# Patient Record
Sex: FEMALE | Race: WHITE | NOT HISPANIC OR LATINO | Employment: OTHER | URBAN - METROPOLITAN AREA
[De-identification: names, ages, dates, MRNs, and addresses within clinical notes are randomized per-mention and may not be internally consistent; named-entity substitution may affect disease eponyms.]

---

## 2017-05-12 ENCOUNTER — OFFICE VISIT - HEALTHEAST (OUTPATIENT)
Dept: INTERNAL MEDICINE | Facility: CLINIC | Age: 82
End: 2017-05-12

## 2017-05-12 ENCOUNTER — COMMUNICATION - HEALTHEAST (OUTPATIENT)
Dept: INTERNAL MEDICINE | Facility: CLINIC | Age: 82
End: 2017-05-12

## 2017-05-12 DIAGNOSIS — I10 HTN (HYPERTENSION): ICD-10-CM

## 2017-05-12 DIAGNOSIS — M81.0 OSTEOPOROSIS: ICD-10-CM

## 2017-05-12 DIAGNOSIS — Z00.00 ROUTINE PHYSICAL EXAMINATION: ICD-10-CM

## 2017-05-12 DIAGNOSIS — Z78.9 NONSMOKER: ICD-10-CM

## 2017-05-12 DIAGNOSIS — Z66 DNR (DO NOT RESUSCITATE): ICD-10-CM

## 2017-05-12 ASSESSMENT — MIFFLIN-ST. JEOR: SCORE: 786.4

## 2017-05-17 ENCOUNTER — RECORDS - HEALTHEAST (OUTPATIENT)
Dept: ADMINISTRATIVE | Facility: OTHER | Age: 82
End: 2017-05-17

## 2018-02-28 ENCOUNTER — COMMUNICATION - HEALTHEAST (OUTPATIENT)
Dept: INTERNAL MEDICINE | Facility: CLINIC | Age: 83
End: 2018-02-28

## 2018-02-28 DIAGNOSIS — I10 HTN (HYPERTENSION): ICD-10-CM

## 2018-03-29 ENCOUNTER — COMMUNICATION - HEALTHEAST (OUTPATIENT)
Dept: INTERNAL MEDICINE | Facility: CLINIC | Age: 83
End: 2018-03-29

## 2018-05-07 ENCOUNTER — COMMUNICATION - HEALTHEAST (OUTPATIENT)
Dept: INTERNAL MEDICINE | Facility: CLINIC | Age: 83
End: 2018-05-07

## 2018-05-07 DIAGNOSIS — I10 HTN (HYPERTENSION): ICD-10-CM

## 2018-06-05 ENCOUNTER — COMMUNICATION - HEALTHEAST (OUTPATIENT)
Dept: INTERNAL MEDICINE | Facility: CLINIC | Age: 83
End: 2018-06-05

## 2018-06-05 ENCOUNTER — OFFICE VISIT - HEALTHEAST (OUTPATIENT)
Dept: INTERNAL MEDICINE | Facility: CLINIC | Age: 83
End: 2018-06-05

## 2018-06-05 DIAGNOSIS — I10 HTN (HYPERTENSION): ICD-10-CM

## 2018-06-05 DIAGNOSIS — M81.0 OSTEOPOROSIS: ICD-10-CM

## 2018-06-05 DIAGNOSIS — R01.1 HEART MURMUR: ICD-10-CM

## 2018-06-05 LAB
ANION GAP SERPL CALCULATED.3IONS-SCNC: 11 MMOL/L (ref 5–18)
BUN SERPL-MCNC: 22 MG/DL (ref 8–28)
CALCIUM SERPL-MCNC: 10.1 MG/DL (ref 8.5–10.5)
CHLORIDE BLD-SCNC: 101 MMOL/L (ref 98–107)
CO2 SERPL-SCNC: 29 MMOL/L (ref 22–31)
CREAT SERPL-MCNC: 0.86 MG/DL (ref 0.6–1.1)
GFR SERPL CREATININE-BSD FRML MDRD: >60 ML/MIN/1.73M2
GLUCOSE BLD-MCNC: 91 MG/DL (ref 70–125)
POTASSIUM BLD-SCNC: 3.9 MMOL/L (ref 3.5–5)
SODIUM SERPL-SCNC: 141 MMOL/L (ref 136–145)

## 2018-06-09 ENCOUNTER — COMMUNICATION - HEALTHEAST (OUTPATIENT)
Dept: INTERNAL MEDICINE | Facility: CLINIC | Age: 83
End: 2018-06-09

## 2018-06-09 DIAGNOSIS — I10 HTN (HYPERTENSION): ICD-10-CM

## 2018-08-10 ENCOUNTER — COMMUNICATION - HEALTHEAST (OUTPATIENT)
Dept: INTERNAL MEDICINE | Facility: CLINIC | Age: 83
End: 2018-08-10

## 2019-03-14 ENCOUNTER — COMMUNICATION - HEALTHEAST (OUTPATIENT)
Dept: CARE COORDINATION | Facility: CLINIC | Age: 84
End: 2019-03-14

## 2019-03-14 ENCOUNTER — COMMUNICATION - HEALTHEAST (OUTPATIENT)
Dept: INTERNAL MEDICINE | Facility: CLINIC | Age: 84
End: 2019-03-14

## 2019-03-18 ENCOUNTER — OFFICE VISIT - HEALTHEAST (OUTPATIENT)
Dept: GERIATRICS | Facility: CLINIC | Age: 84
End: 2019-03-18

## 2019-03-18 DIAGNOSIS — R53.1 WEAKNESS: ICD-10-CM

## 2019-03-18 DIAGNOSIS — K92.1 MELENA: ICD-10-CM

## 2019-03-18 DIAGNOSIS — I48.0 PAROXYSMAL ATRIAL FIBRILLATION (H): ICD-10-CM

## 2019-03-18 DIAGNOSIS — I15.9 SECONDARY HYPERTENSION: ICD-10-CM

## 2019-03-21 ENCOUNTER — OFFICE VISIT - HEALTHEAST (OUTPATIENT)
Dept: GERIATRICS | Facility: CLINIC | Age: 84
End: 2019-03-21

## 2019-03-21 DIAGNOSIS — I42.0 DILATED CARDIOMYOPATHY (H): ICD-10-CM

## 2019-03-21 DIAGNOSIS — R53.1 WEAKNESS: ICD-10-CM

## 2019-03-21 DIAGNOSIS — I10 ESSENTIAL HYPERTENSION: ICD-10-CM

## 2019-03-21 DIAGNOSIS — I48.0 PAROXYSMAL ATRIAL FIBRILLATION (H): ICD-10-CM

## 2019-03-22 ENCOUNTER — AMBULATORY - HEALTHEAST (OUTPATIENT)
Dept: CARDIOLOGY | Facility: CLINIC | Age: 84
End: 2019-03-22

## 2019-03-22 ENCOUNTER — OFFICE VISIT - HEALTHEAST (OUTPATIENT)
Dept: GERIATRICS | Facility: CLINIC | Age: 84
End: 2019-03-22

## 2019-03-22 DIAGNOSIS — K92.1 MELENA: ICD-10-CM

## 2019-03-22 DIAGNOSIS — R53.1 WEAKNESS: ICD-10-CM

## 2019-03-22 DIAGNOSIS — I15.9 SECONDARY HYPERTENSION: ICD-10-CM

## 2019-03-22 DIAGNOSIS — I48.0 PAROXYSMAL ATRIAL FIBRILLATION (H): ICD-10-CM

## 2019-03-26 ENCOUNTER — OFFICE VISIT - HEALTHEAST (OUTPATIENT)
Dept: GERIATRICS | Facility: CLINIC | Age: 84
End: 2019-03-26

## 2019-03-26 DIAGNOSIS — K92.2 GASTROINTESTINAL HEMORRHAGE, UNSPECIFIED GASTROINTESTINAL HEMORRHAGE TYPE: ICD-10-CM

## 2019-03-26 DIAGNOSIS — I42.9 CARDIOMYOPATHY, UNSPECIFIED TYPE (H): ICD-10-CM

## 2019-03-26 DIAGNOSIS — I48.91 ATRIAL FIBRILLATION, UNSPECIFIED TYPE (H): ICD-10-CM

## 2019-03-26 DIAGNOSIS — I10 ESSENTIAL HYPERTENSION: ICD-10-CM

## 2019-04-01 ENCOUNTER — RECORDS - HEALTHEAST (OUTPATIENT)
Dept: ADMINISTRATIVE | Facility: OTHER | Age: 84
End: 2019-04-01

## 2019-04-02 ENCOUNTER — OFFICE VISIT - HEALTHEAST (OUTPATIENT)
Dept: GERIATRICS | Facility: CLINIC | Age: 84
End: 2019-04-02

## 2019-04-02 DIAGNOSIS — K92.1 MELENA: ICD-10-CM

## 2019-04-02 DIAGNOSIS — I10 ESSENTIAL HYPERTENSION: ICD-10-CM

## 2019-04-02 DIAGNOSIS — I48.91 ATRIAL FIBRILLATION, UNSPECIFIED TYPE (H): ICD-10-CM

## 2019-04-02 DIAGNOSIS — R53.1 WEAKNESS: ICD-10-CM

## 2019-04-03 ENCOUNTER — RECORDS - HEALTHEAST (OUTPATIENT)
Dept: ADMINISTRATIVE | Facility: OTHER | Age: 84
End: 2019-04-03

## 2019-04-04 ENCOUNTER — RECORDS - HEALTHEAST (OUTPATIENT)
Dept: ADMINISTRATIVE | Facility: OTHER | Age: 84
End: 2019-04-04

## 2019-04-04 ENCOUNTER — AMBULATORY - HEALTHEAST (OUTPATIENT)
Dept: GERIATRICS | Facility: CLINIC | Age: 84
End: 2019-04-04

## 2019-04-04 ENCOUNTER — COMMUNICATION - HEALTHEAST (OUTPATIENT)
Dept: INTERNAL MEDICINE | Facility: CLINIC | Age: 84
End: 2019-04-04

## 2019-04-04 ENCOUNTER — COMMUNICATION - HEALTHEAST (OUTPATIENT)
Dept: GERIATRICS | Facility: CLINIC | Age: 84
End: 2019-04-04

## 2019-04-05 ENCOUNTER — COMMUNICATION - HEALTHEAST (OUTPATIENT)
Dept: INTERNAL MEDICINE | Facility: CLINIC | Age: 84
End: 2019-04-05

## 2019-04-05 ENCOUNTER — RECORDS - HEALTHEAST (OUTPATIENT)
Dept: ADMINISTRATIVE | Facility: OTHER | Age: 84
End: 2019-04-05

## 2019-04-08 ENCOUNTER — COMMUNICATION - HEALTHEAST (OUTPATIENT)
Dept: GERIATRICS | Facility: CLINIC | Age: 84
End: 2019-04-08

## 2019-04-12 ENCOUNTER — OFFICE VISIT - HEALTHEAST (OUTPATIENT)
Dept: INTERNAL MEDICINE | Facility: CLINIC | Age: 84
End: 2019-04-12

## 2019-04-12 DIAGNOSIS — Z09 HOSPITAL DISCHARGE FOLLOW-UP: ICD-10-CM

## 2019-04-12 DIAGNOSIS — D64.9 ANEMIA, UNSPECIFIED TYPE: ICD-10-CM

## 2019-04-12 DIAGNOSIS — I10 ESSENTIAL HYPERTENSION: ICD-10-CM

## 2019-04-12 DIAGNOSIS — R06.02 SOB (SHORTNESS OF BREATH): ICD-10-CM

## 2019-04-12 DIAGNOSIS — K92.1 MELENA: ICD-10-CM

## 2019-04-12 DIAGNOSIS — I48.91 ATRIAL FIBRILLATION WITH RAPID VENTRICULAR RESPONSE (H): ICD-10-CM

## 2019-04-12 DIAGNOSIS — R53.1 WEAKNESS: ICD-10-CM

## 2019-04-12 DIAGNOSIS — A41.9 SEPSIS, DUE TO UNSPECIFIED ORGANISM: ICD-10-CM

## 2019-04-12 DIAGNOSIS — I48.0 PAROXYSMAL ATRIAL FIBRILLATION (H): ICD-10-CM

## 2019-04-12 DIAGNOSIS — I42.0 DILATED CARDIOMYOPATHY (H): ICD-10-CM

## 2019-04-12 DIAGNOSIS — N39.0 URINARY TRACT INFECTION WITHOUT HEMATURIA, SITE UNSPECIFIED: ICD-10-CM

## 2019-04-12 LAB
ANION GAP SERPL CALCULATED.3IONS-SCNC: 9 MMOL/L (ref 5–18)
BNP SERPL-MCNC: 3281 PG/ML (ref 0–167)
BUN SERPL-MCNC: 24 MG/DL (ref 8–28)
CALCIUM SERPL-MCNC: 9.5 MG/DL (ref 8.5–10.5)
CHLORIDE BLD-SCNC: 105 MMOL/L (ref 98–107)
CO2 SERPL-SCNC: 27 MMOL/L (ref 22–31)
CREAT SERPL-MCNC: 0.88 MG/DL (ref 0.6–1.1)
ERYTHROCYTE [DISTWIDTH] IN BLOOD BY AUTOMATED COUNT: 11.1 % (ref 11–14.5)
GFR SERPL CREATININE-BSD FRML MDRD: 60 ML/MIN/1.73M2
GLUCOSE BLD-MCNC: 68 MG/DL (ref 70–125)
HCT VFR BLD AUTO: 38.6 % (ref 35–47)
HGB BLD-MCNC: 13.2 G/DL (ref 12–16)
MCH RBC QN AUTO: 31.3 PG (ref 27–34)
MCHC RBC AUTO-ENTMCNC: 34.1 G/DL (ref 32–36)
MCV RBC AUTO: 92 FL (ref 80–100)
PLATELET # BLD AUTO: 265 THOU/UL (ref 140–440)
PMV BLD AUTO: 8.3 FL (ref 7–10)
POTASSIUM BLD-SCNC: 4.8 MMOL/L (ref 3.5–5)
RBC # BLD AUTO: 4.21 MILL/UL (ref 3.8–5.4)
SODIUM SERPL-SCNC: 141 MMOL/L (ref 136–145)
WBC: 9 THOU/UL (ref 4–11)

## 2019-04-14 ENCOUNTER — COMMUNICATION - HEALTHEAST (OUTPATIENT)
Dept: INTERNAL MEDICINE | Facility: CLINIC | Age: 84
End: 2019-04-14

## 2019-04-23 ENCOUNTER — RECORDS - HEALTHEAST (OUTPATIENT)
Dept: ADMINISTRATIVE | Facility: OTHER | Age: 84
End: 2019-04-23

## 2019-04-24 ENCOUNTER — RECORDS - HEALTHEAST (OUTPATIENT)
Dept: ADMINISTRATIVE | Facility: OTHER | Age: 84
End: 2019-04-24

## 2019-04-26 ENCOUNTER — RECORDS - HEALTHEAST (OUTPATIENT)
Dept: ADMINISTRATIVE | Facility: OTHER | Age: 84
End: 2019-04-26

## 2019-05-03 ENCOUNTER — AMBULATORY - HEALTHEAST (OUTPATIENT)
Dept: OTHER | Facility: CLINIC | Age: 84
End: 2019-05-03

## 2019-05-08 ENCOUNTER — OFFICE VISIT - HEALTHEAST (OUTPATIENT)
Dept: CARDIOLOGY | Facility: CLINIC | Age: 84
End: 2019-05-08

## 2019-05-08 DIAGNOSIS — I48.0 PAROXYSMAL ATRIAL FIBRILLATION (H): ICD-10-CM

## 2019-05-08 DIAGNOSIS — I15.9 SECONDARY HYPERTENSION: ICD-10-CM

## 2019-05-08 DIAGNOSIS — I42.0 DILATED CARDIOMYOPATHY (H): ICD-10-CM

## 2019-05-08 ASSESSMENT — MIFFLIN-ST. JEOR: SCORE: 751.03

## 2019-06-24 ENCOUNTER — OFFICE VISIT - HEALTHEAST (OUTPATIENT)
Dept: INTERNAL MEDICINE | Facility: CLINIC | Age: 84
End: 2019-06-24

## 2019-06-24 ENCOUNTER — COMMUNICATION - HEALTHEAST (OUTPATIENT)
Dept: INTERNAL MEDICINE | Facility: CLINIC | Age: 84
End: 2019-06-24

## 2019-06-24 DIAGNOSIS — I42.0 DILATED CARDIOMYOPATHY (H): ICD-10-CM

## 2019-06-24 DIAGNOSIS — D64.9 ANEMIA, UNSPECIFIED TYPE: ICD-10-CM

## 2019-06-24 DIAGNOSIS — R06.02 SOB (SHORTNESS OF BREATH): ICD-10-CM

## 2019-06-24 DIAGNOSIS — K92.1 MELENA: ICD-10-CM

## 2019-06-24 DIAGNOSIS — R09.89 CARDIAC LEFT VENTRICULAR EJECTION FRACTION 10-20 PERCENT: ICD-10-CM

## 2019-06-24 DIAGNOSIS — K59.00 CONSTIPATION, UNSPECIFIED CONSTIPATION TYPE: ICD-10-CM

## 2019-06-24 DIAGNOSIS — I48.0 PAF (PAROXYSMAL ATRIAL FIBRILLATION) (H): ICD-10-CM

## 2019-06-24 DIAGNOSIS — N39.0 URINARY TRACT INFECTION WITHOUT HEMATURIA, SITE UNSPECIFIED: ICD-10-CM

## 2019-06-24 DIAGNOSIS — D69.2 SENILE PURPURA (H): ICD-10-CM

## 2019-06-24 DIAGNOSIS — I10 ESSENTIAL HYPERTENSION: ICD-10-CM

## 2019-06-24 DIAGNOSIS — R53.1 WEAKNESS: ICD-10-CM

## 2019-06-24 DIAGNOSIS — A41.9 SEPSIS, DUE TO UNSPECIFIED ORGANISM: ICD-10-CM

## 2019-06-24 LAB
ANION GAP SERPL CALCULATED.3IONS-SCNC: 11 MMOL/L (ref 5–18)
BNP SERPL-MCNC: 1339 PG/ML (ref 0–167)
BUN SERPL-MCNC: 26 MG/DL (ref 8–28)
CALCIUM SERPL-MCNC: 9.9 MG/DL (ref 8.5–10.5)
CHLORIDE BLD-SCNC: 104 MMOL/L (ref 98–107)
CO2 SERPL-SCNC: 27 MMOL/L (ref 22–31)
CREAT SERPL-MCNC: 1.05 MG/DL (ref 0.6–1.1)
ERYTHROCYTE [DISTWIDTH] IN BLOOD BY AUTOMATED COUNT: 11.7 % (ref 11–14.5)
GFR SERPL CREATININE-BSD FRML MDRD: 49 ML/MIN/1.73M2
GLUCOSE BLD-MCNC: 71 MG/DL (ref 70–125)
HCT VFR BLD AUTO: 38.4 % (ref 35–47)
HGB BLD-MCNC: 12.4 G/DL (ref 12–16)
MCH RBC QN AUTO: 28.5 PG (ref 27–34)
MCHC RBC AUTO-ENTMCNC: 32.4 G/DL (ref 32–36)
MCV RBC AUTO: 88 FL (ref 80–100)
PLATELET # BLD AUTO: 266 THOU/UL (ref 140–440)
PMV BLD AUTO: 8.2 FL (ref 7–10)
POTASSIUM BLD-SCNC: 5.3 MMOL/L (ref 3.5–5)
RBC # BLD AUTO: 4.37 MILL/UL (ref 3.8–5.4)
SODIUM SERPL-SCNC: 142 MMOL/L (ref 136–145)
WBC: 6.3 THOU/UL (ref 4–11)

## 2019-06-24 RX ORDER — FUROSEMIDE 40 MG
40 TABLET ORAL DAILY
Qty: 90 TABLET | Refills: 3 | Status: ON HOLD | OUTPATIENT
Start: 2019-06-24 | End: 2022-01-01

## 2019-06-27 ENCOUNTER — COMMUNICATION - HEALTHEAST (OUTPATIENT)
Dept: INTERNAL MEDICINE | Facility: CLINIC | Age: 84
End: 2019-06-27

## 2019-06-27 ENCOUNTER — COMMUNICATION - HEALTHEAST (OUTPATIENT)
Dept: SCHEDULING | Facility: CLINIC | Age: 84
End: 2019-06-27

## 2019-06-27 ASSESSMENT — MIFFLIN-ST. JEOR: SCORE: 800.01

## 2019-06-28 ENCOUNTER — ANESTHESIA - HEALTHEAST (OUTPATIENT)
Dept: SURGERY | Facility: HOSPITAL | Age: 84
End: 2019-06-28

## 2019-06-28 ENCOUNTER — SURGERY - HEALTHEAST (OUTPATIENT)
Dept: SURGERY | Facility: HOSPITAL | Age: 84
End: 2019-06-28

## 2019-07-01 ASSESSMENT — MIFFLIN-ST. JEOR: SCORE: 899.35

## 2019-07-05 ENCOUNTER — OFFICE VISIT - HEALTHEAST (OUTPATIENT)
Dept: GERIATRICS | Facility: CLINIC | Age: 84
End: 2019-07-05

## 2019-07-05 DIAGNOSIS — I10 ESSENTIAL HYPERTENSION: ICD-10-CM

## 2019-07-05 DIAGNOSIS — N18.2 CKD (CHRONIC KIDNEY DISEASE) STAGE 2, GFR 60-89 ML/MIN: ICD-10-CM

## 2019-07-05 DIAGNOSIS — Z98.890 S/P ORIF (OPEN REDUCTION INTERNAL FIXATION) FRACTURE: ICD-10-CM

## 2019-07-05 DIAGNOSIS — I42.9 CARDIOMYOPATHY, UNSPECIFIED TYPE (H): ICD-10-CM

## 2019-07-05 DIAGNOSIS — Z87.81 S/P ORIF (OPEN REDUCTION INTERNAL FIXATION) FRACTURE: ICD-10-CM

## 2019-07-05 DIAGNOSIS — Z87.81 S/P LEFT HIP FRACTURE: ICD-10-CM

## 2019-07-05 DIAGNOSIS — I48.0 PAROXYSMAL ATRIAL FIBRILLATION (H): ICD-10-CM

## 2019-07-09 ENCOUNTER — AMBULATORY - HEALTHEAST (OUTPATIENT)
Dept: OTHER | Facility: CLINIC | Age: 84
End: 2019-07-09

## 2019-07-09 ENCOUNTER — OFFICE VISIT - HEALTHEAST (OUTPATIENT)
Dept: GERIATRICS | Facility: CLINIC | Age: 84
End: 2019-07-09

## 2019-07-09 DIAGNOSIS — I48.0 PAROXYSMAL ATRIAL FIBRILLATION (H): ICD-10-CM

## 2019-07-09 DIAGNOSIS — Z87.81 S/P ORIF (OPEN REDUCTION INTERNAL FIXATION) FRACTURE: ICD-10-CM

## 2019-07-09 DIAGNOSIS — I10 ESSENTIAL HYPERTENSION: ICD-10-CM

## 2019-07-09 DIAGNOSIS — S72.002A LEFT DISPLACED FEMORAL NECK FRACTURE (H): ICD-10-CM

## 2019-07-09 DIAGNOSIS — Z98.890 S/P ORIF (OPEN REDUCTION INTERNAL FIXATION) FRACTURE: ICD-10-CM

## 2019-07-09 DIAGNOSIS — I42.0 DILATED CARDIOMYOPATHY (H): ICD-10-CM

## 2019-07-15 ENCOUNTER — OFFICE VISIT - HEALTHEAST (OUTPATIENT)
Dept: GERIATRICS | Facility: CLINIC | Age: 84
End: 2019-07-15

## 2019-07-15 DIAGNOSIS — R53.81 DEBILITY: ICD-10-CM

## 2019-07-15 DIAGNOSIS — S72.002A LEFT DISPLACED FEMORAL NECK FRACTURE (H): ICD-10-CM

## 2019-07-15 DIAGNOSIS — I10 ESSENTIAL HYPERTENSION: ICD-10-CM

## 2019-07-15 DIAGNOSIS — I48.0 PAROXYSMAL ATRIAL FIBRILLATION (H): ICD-10-CM

## 2019-07-16 ENCOUNTER — OFFICE VISIT - HEALTHEAST (OUTPATIENT)
Dept: GERIATRICS | Facility: CLINIC | Age: 84
End: 2019-07-16

## 2019-07-16 DIAGNOSIS — I10 ESSENTIAL HYPERTENSION: ICD-10-CM

## 2019-07-16 DIAGNOSIS — R52 PAIN MANAGEMENT: ICD-10-CM

## 2019-07-16 DIAGNOSIS — I48.0 PAROXYSMAL ATRIAL FIBRILLATION (H): ICD-10-CM

## 2019-07-16 DIAGNOSIS — S72.002A LEFT DISPLACED FEMORAL NECK FRACTURE (H): ICD-10-CM

## 2019-07-16 RX ORDER — ACETAMINOPHEN 500 MG
1000 TABLET ORAL EVERY 6 HOURS PRN
Status: ON HOLD | COMMUNITY
Start: 2019-07-16 | End: 2022-01-01

## 2019-07-19 ENCOUNTER — COMMUNICATION - HEALTHEAST (OUTPATIENT)
Dept: GERIATRICS | Facility: CLINIC | Age: 84
End: 2019-07-19

## 2019-07-19 ENCOUNTER — COMMUNICATION - HEALTHEAST (OUTPATIENT)
Dept: INTERNAL MEDICINE | Facility: CLINIC | Age: 84
End: 2019-07-19

## 2019-07-19 ENCOUNTER — RECORDS - HEALTHEAST (OUTPATIENT)
Dept: ADMINISTRATIVE | Facility: OTHER | Age: 84
End: 2019-07-19

## 2019-07-19 ENCOUNTER — AMBULATORY - HEALTHEAST (OUTPATIENT)
Dept: GERIATRICS | Facility: CLINIC | Age: 84
End: 2019-07-19

## 2019-07-22 ENCOUNTER — COMMUNICATION - HEALTHEAST (OUTPATIENT)
Dept: INTERNAL MEDICINE | Facility: CLINIC | Age: 84
End: 2019-07-22

## 2019-07-25 ENCOUNTER — RECORDS - HEALTHEAST (OUTPATIENT)
Dept: ADMINISTRATIVE | Facility: OTHER | Age: 84
End: 2019-07-25

## 2019-07-26 ENCOUNTER — COMMUNICATION - HEALTHEAST (OUTPATIENT)
Dept: INTERNAL MEDICINE | Facility: CLINIC | Age: 84
End: 2019-07-26

## 2019-07-29 ENCOUNTER — COMMUNICATION - HEALTHEAST (OUTPATIENT)
Dept: INTERNAL MEDICINE | Facility: CLINIC | Age: 84
End: 2019-07-29

## 2019-07-29 ENCOUNTER — RECORDS - HEALTHEAST (OUTPATIENT)
Dept: ADMINISTRATIVE | Facility: OTHER | Age: 84
End: 2019-07-29

## 2019-07-29 DIAGNOSIS — I42.0 DILATED CARDIOMYOPATHY (H): ICD-10-CM

## 2019-07-29 RX ORDER — METOPROLOL TARTRATE 25 MG/1
25 TABLET, FILM COATED ORAL 2 TIMES DAILY
Qty: 60 TABLET | Refills: 11 | Status: ON HOLD | OUTPATIENT
Start: 2019-07-29 | End: 2022-01-01

## 2019-08-08 ENCOUNTER — RECORDS - HEALTHEAST (OUTPATIENT)
Dept: ADMINISTRATIVE | Facility: OTHER | Age: 84
End: 2019-08-08

## 2019-08-12 ENCOUNTER — RECORDS - HEALTHEAST (OUTPATIENT)
Dept: ADMINISTRATIVE | Facility: OTHER | Age: 84
End: 2019-08-12

## 2019-08-14 ENCOUNTER — RECORDS - HEALTHEAST (OUTPATIENT)
Dept: ADMINISTRATIVE | Facility: OTHER | Age: 84
End: 2019-08-14

## 2019-08-30 ENCOUNTER — COMMUNICATION - HEALTHEAST (OUTPATIENT)
Dept: INTERNAL MEDICINE | Facility: CLINIC | Age: 84
End: 2019-08-30

## 2019-08-30 ENCOUNTER — OFFICE VISIT - HEALTHEAST (OUTPATIENT)
Dept: INTERNAL MEDICINE | Facility: CLINIC | Age: 84
End: 2019-08-30

## 2019-08-30 DIAGNOSIS — R09.89 CARDIAC LEFT VENTRICULAR EJECTION FRACTION 10-20 PERCENT: ICD-10-CM

## 2019-08-30 DIAGNOSIS — I10 ESSENTIAL HYPERTENSION: ICD-10-CM

## 2019-08-30 DIAGNOSIS — S72.002A LEFT DISPLACED FEMORAL NECK FRACTURE (H): ICD-10-CM

## 2019-08-30 DIAGNOSIS — M80.00XD AGE-RELATED OSTEOPOROSIS WITH CURRENT PATHOLOGICAL FRACTURE WITH ROUTINE HEALING, SUBSEQUENT ENCOUNTER: ICD-10-CM

## 2019-08-30 DIAGNOSIS — I42.0 DILATED CARDIOMYOPATHY (H): ICD-10-CM

## 2019-08-30 DIAGNOSIS — S72.002A HIP FRACTURE REQUIRING OPERATIVE REPAIR, LEFT, CLOSED, INITIAL ENCOUNTER (H): ICD-10-CM

## 2019-08-30 DIAGNOSIS — K92.1 MELENA: ICD-10-CM

## 2019-08-30 DIAGNOSIS — I48.0 PAROXYSMAL ATRIAL FIBRILLATION (H): ICD-10-CM

## 2019-08-30 LAB
ANION GAP SERPL CALCULATED.3IONS-SCNC: 10 MMOL/L (ref 5–18)
BUN SERPL-MCNC: 29 MG/DL (ref 8–28)
CALCIUM SERPL-MCNC: 9.4 MG/DL (ref 8.5–10.5)
CHLORIDE BLD-SCNC: 104 MMOL/L (ref 98–107)
CO2 SERPL-SCNC: 28 MMOL/L (ref 22–31)
CREAT SERPL-MCNC: 1.13 MG/DL (ref 0.6–1.1)
ERYTHROCYTE [DISTWIDTH] IN BLOOD BY AUTOMATED COUNT: 12.8 % (ref 11–14.5)
GFR SERPL CREATININE-BSD FRML MDRD: 45 ML/MIN/1.73M2
GLUCOSE BLD-MCNC: 66 MG/DL (ref 70–125)
HCT VFR BLD AUTO: 36.5 % (ref 35–47)
HGB BLD-MCNC: 11.5 G/DL (ref 12–16)
MCH RBC QN AUTO: 28 PG (ref 27–34)
MCHC RBC AUTO-ENTMCNC: 31.4 G/DL (ref 32–36)
MCV RBC AUTO: 89 FL (ref 80–100)
PLATELET # BLD AUTO: 275 THOU/UL (ref 140–440)
PMV BLD AUTO: 8.2 FL (ref 7–10)
POTASSIUM BLD-SCNC: 5.2 MMOL/L (ref 3.5–5)
RBC # BLD AUTO: 4.1 MILL/UL (ref 3.8–5.4)
SODIUM SERPL-SCNC: 142 MMOL/L (ref 136–145)
WBC: 9.6 THOU/UL (ref 4–11)

## 2019-09-05 ENCOUNTER — RECORDS - HEALTHEAST (OUTPATIENT)
Dept: ADMINISTRATIVE | Facility: OTHER | Age: 84
End: 2019-09-05

## 2020-01-03 ENCOUNTER — COMMUNICATION - HEALTHEAST (OUTPATIENT)
Dept: INTERNAL MEDICINE | Facility: CLINIC | Age: 85
End: 2020-01-03

## 2020-01-03 ENCOUNTER — OFFICE VISIT - HEALTHEAST (OUTPATIENT)
Dept: INTERNAL MEDICINE | Facility: CLINIC | Age: 85
End: 2020-01-03

## 2020-01-03 DIAGNOSIS — I42.0 DILATED CARDIOMYOPATHY (H): ICD-10-CM

## 2020-01-03 DIAGNOSIS — R06.02 SOB (SHORTNESS OF BREATH): ICD-10-CM

## 2020-01-03 DIAGNOSIS — D69.2 SENILE PURPURA (H): ICD-10-CM

## 2020-01-03 DIAGNOSIS — I10 ESSENTIAL HYPERTENSION: ICD-10-CM

## 2020-01-03 DIAGNOSIS — I48.0 PAROXYSMAL ATRIAL FIBRILLATION (H): ICD-10-CM

## 2020-01-03 LAB
ANION GAP SERPL CALCULATED.3IONS-SCNC: 10 MMOL/L (ref 5–18)
BNP SERPL-MCNC: 1470 PG/ML (ref 0–167)
BUN SERPL-MCNC: 31 MG/DL (ref 8–28)
CALCIUM SERPL-MCNC: 9.2 MG/DL (ref 8.5–10.5)
CHLORIDE BLD-SCNC: 106 MMOL/L (ref 98–107)
CO2 SERPL-SCNC: 27 MMOL/L (ref 22–31)
CREAT SERPL-MCNC: 1.21 MG/DL (ref 0.6–1.1)
ERYTHROCYTE [DISTWIDTH] IN BLOOD BY AUTOMATED COUNT: 14.9 % (ref 11–14.5)
GFR SERPL CREATININE-BSD FRML MDRD: 41 ML/MIN/1.73M2
GLUCOSE BLD-MCNC: 81 MG/DL (ref 70–125)
HCT VFR BLD AUTO: 38.4 % (ref 35–47)
HGB BLD-MCNC: 12.3 G/DL (ref 12–16)
MCH RBC QN AUTO: 28.7 PG (ref 27–34)
MCHC RBC AUTO-ENTMCNC: 32 G/DL (ref 32–36)
MCV RBC AUTO: 90 FL (ref 80–100)
PLATELET # BLD AUTO: 250 THOU/UL (ref 140–440)
PMV BLD AUTO: 8.2 FL (ref 7–10)
POTASSIUM BLD-SCNC: 5.2 MMOL/L (ref 3.5–5)
RBC # BLD AUTO: 4.28 MILL/UL (ref 3.8–5.4)
SODIUM SERPL-SCNC: 143 MMOL/L (ref 136–145)
WBC: 7.8 THOU/UL (ref 4–11)

## 2020-02-24 ENCOUNTER — COMMUNICATION - HEALTHEAST (OUTPATIENT)
Dept: INTERNAL MEDICINE | Facility: CLINIC | Age: 85
End: 2020-02-24

## 2020-03-03 ENCOUNTER — RECORDS - HEALTHEAST (OUTPATIENT)
Dept: ADMINISTRATIVE | Facility: OTHER | Age: 85
End: 2020-03-03

## 2020-03-11 ENCOUNTER — RECORDS - HEALTHEAST (OUTPATIENT)
Dept: ADMINISTRATIVE | Facility: OTHER | Age: 85
End: 2020-03-11

## 2020-04-15 ENCOUNTER — RECORDS - HEALTHEAST (OUTPATIENT)
Dept: ADMINISTRATIVE | Facility: OTHER | Age: 85
End: 2020-04-15

## 2020-06-04 ENCOUNTER — COMMUNICATION - HEALTHEAST (OUTPATIENT)
Dept: INTERNAL MEDICINE | Facility: CLINIC | Age: 85
End: 2020-06-04

## 2020-07-13 ENCOUNTER — RECORDS - HEALTHEAST (OUTPATIENT)
Dept: ADMINISTRATIVE | Facility: OTHER | Age: 85
End: 2020-07-13

## 2020-08-07 ENCOUNTER — OFFICE VISIT - HEALTHEAST (OUTPATIENT)
Dept: INTERNAL MEDICINE | Facility: CLINIC | Age: 85
End: 2020-08-07

## 2020-08-07 DIAGNOSIS — I42.0 DILATED CARDIOMYOPATHY (H): ICD-10-CM

## 2020-08-07 DIAGNOSIS — R06.02 SOB (SHORTNESS OF BREATH): ICD-10-CM

## 2020-08-07 DIAGNOSIS — I10 ESSENTIAL HYPERTENSION: ICD-10-CM

## 2020-08-07 DIAGNOSIS — I48.0 PAROXYSMAL ATRIAL FIBRILLATION (H): ICD-10-CM

## 2020-08-07 LAB
ANION GAP SERPL CALCULATED.3IONS-SCNC: 14 MMOL/L (ref 5–18)
BUN SERPL-MCNC: 38 MG/DL (ref 8–28)
CALCIUM SERPL-MCNC: 9.5 MG/DL (ref 8.5–10.5)
CHLORIDE BLD-SCNC: 106 MMOL/L (ref 98–107)
CO2 SERPL-SCNC: 20 MMOL/L (ref 22–31)
CREAT SERPL-MCNC: 1.19 MG/DL (ref 0.6–1.1)
ERYTHROCYTE [DISTWIDTH] IN BLOOD BY AUTOMATED COUNT: 11.9 % (ref 11–14.5)
GFR SERPL CREATININE-BSD FRML MDRD: 42 ML/MIN/1.73M2
GLUCOSE BLD-MCNC: 83 MG/DL (ref 70–125)
HCT VFR BLD AUTO: 38.1 % (ref 35–47)
HGB BLD-MCNC: 12.7 G/DL (ref 12–16)
MCH RBC QN AUTO: 31.3 PG (ref 27–34)
MCHC RBC AUTO-ENTMCNC: 33.3 G/DL (ref 32–36)
MCV RBC AUTO: 94 FL (ref 80–100)
PLATELET # BLD AUTO: 243 THOU/UL (ref 140–440)
PMV BLD AUTO: 8.2 FL (ref 7–10)
POTASSIUM BLD-SCNC: 5.4 MMOL/L (ref 3.5–5)
RBC # BLD AUTO: 4.05 MILL/UL (ref 3.8–5.4)
SODIUM SERPL-SCNC: 140 MMOL/L (ref 136–145)
WBC: 9.7 THOU/UL (ref 4–11)

## 2020-08-08 ENCOUNTER — COMMUNICATION - HEALTHEAST (OUTPATIENT)
Dept: INTERNAL MEDICINE | Facility: CLINIC | Age: 85
End: 2020-08-08

## 2021-01-01 ENCOUNTER — RECORDS - HEALTHEAST (OUTPATIENT)
Dept: ADMINISTRATIVE | Facility: OTHER | Age: 86
End: 2021-01-01

## 2021-01-01 ENCOUNTER — TELEPHONE (OUTPATIENT)
Dept: INTERNAL MEDICINE | Facility: CLINIC | Age: 86
End: 2021-01-01
Payer: COMMERCIAL

## 2021-01-01 ENCOUNTER — COMMUNICATION - HEALTHEAST (OUTPATIENT)
Dept: INTERNAL MEDICINE | Facility: CLINIC | Age: 86
End: 2021-01-01

## 2021-01-01 VITALS — HEIGHT: 64 IN | BODY MASS INDEX: 19.92 KG/M2 | WEIGHT: 116.7 LBS

## 2021-01-01 VITALS
BODY MASS INDEX: 17.34 KG/M2 | WEIGHT: 101 LBS | OXYGEN SATURATION: 94 % | SYSTOLIC BLOOD PRESSURE: 122 MMHG | DIASTOLIC BLOOD PRESSURE: 58 MMHG | HEART RATE: 89 BPM

## 2021-01-01 VITALS — WEIGHT: 104.2 LBS | BODY MASS INDEX: 17.89 KG/M2

## 2021-01-01 VITALS — WEIGHT: 93.6 LBS | BODY MASS INDEX: 18.28 KG/M2

## 2021-01-01 VITALS
HEART RATE: 90 BPM | BODY MASS INDEX: 16.48 KG/M2 | DIASTOLIC BLOOD PRESSURE: 60 MMHG | WEIGHT: 96 LBS | SYSTOLIC BLOOD PRESSURE: 120 MMHG

## 2021-01-01 VITALS — BODY MASS INDEX: 18.67 KG/M2 | WEIGHT: 95.6 LBS

## 2021-01-01 VITALS — HEIGHT: 62 IN | WEIGHT: 98.8 LBS | BODY MASS INDEX: 18.18 KG/M2

## 2021-01-01 VITALS — WEIGHT: 101 LBS | BODY MASS INDEX: 17.34 KG/M2

## 2021-01-01 VITALS — WEIGHT: 98 LBS | BODY MASS INDEX: 19.24 KG/M2 | HEIGHT: 60 IN

## 2021-01-01 VITALS — BODY MASS INDEX: 17.51 KG/M2 | WEIGHT: 102 LBS

## 2021-01-01 VITALS — BODY MASS INDEX: 18.2 KG/M2 | WEIGHT: 99.5 LBS

## 2021-01-01 VITALS — BODY MASS INDEX: 17.89 KG/M2 | WEIGHT: 104.2 LBS

## 2021-01-01 VITALS — WEIGHT: 97 LBS | BODY MASS INDEX: 18.94 KG/M2

## 2021-01-01 VITALS — WEIGHT: 100 LBS | BODY MASS INDEX: 19.53 KG/M2

## 2021-01-01 VITALS — WEIGHT: 96.8 LBS | BODY MASS INDEX: 18.9 KG/M2

## 2021-03-25 ENCOUNTER — RECORDS - HEALTHEAST (OUTPATIENT)
Dept: ADMINISTRATIVE | Facility: OTHER | Age: 86
End: 2021-03-25

## 2021-03-31 ENCOUNTER — COMMUNICATION - HEALTHEAST (OUTPATIENT)
Dept: ADMINISTRATIVE | Facility: CLINIC | Age: 86
End: 2021-03-31

## 2021-04-01 ENCOUNTER — RECORDS - HEALTHEAST (OUTPATIENT)
Dept: ADMINISTRATIVE | Facility: OTHER | Age: 86
End: 2021-04-01

## 2021-04-06 ENCOUNTER — RECORDS - HEALTHEAST (OUTPATIENT)
Dept: ADMINISTRATIVE | Facility: OTHER | Age: 86
End: 2021-04-06

## 2021-05-27 NOTE — PROGRESS NOTES
[facility] Patient Outreach:     Discharge Date::  4/3/19      Reason for TCU stay (discharge diagnosis)::  Sepsis, new A-fib with RVR, weakness, melena, shortness of breath

## 2021-05-27 NOTE — PROGRESS NOTES
[facility] Patient Outreach:     Discharge Date::  4/3/19      Reason for TCU stay (discharge diagnosis)::  Sepsis, new A-fib with RVR, weakness, melena, shortness of breath      Are you feeling better, the same or worse since your discharge?:  Patient is feeling better          As part of your discharge plan, did they discuss home care with you?: Yes        Have your seen them yet, or are they scheduled to visit?: Yes                Do you have any follow up visits scheduled with your PCP or Specialist?:  Yes, with PCP      (RN) Is it scheduled soon enough (3-5 days)?: No        (RN) Is the patient okay with moving appointment up (if RN feels appropriate)?: No

## 2021-05-27 NOTE — PROGRESS NOTES
Wt Readings from Last 20 Encounters:   04/12/19 (!) 97 lb (44 kg)   04/02/19 (!) 93 lb 9.6 oz (42.5 kg)   03/22/19 (!) 95 lb 9.6 oz (43.4 kg)   03/18/19 (!) 96 lb 12.8 oz (43.9 kg)   03/16/19 (!) 98 lb (44.5 kg)   03/13/19 (!) 97 lb 14.4 oz (44.4 kg)   06/05/18 99 lb 8 oz (45.1 kg)   05/12/17 (!) 98 lb 12.8 oz (44.8 kg)   07/21/15 102 lb (46.3 kg)

## 2021-05-27 NOTE — PROGRESS NOTES
[facility]    Facility:   [facility] [[id number]]   Code Status: DNR       Chief Complaint   Patient presents with     Follow Up     TCU 3/26/19.       HPI:   Pinky is a 89 y.o. female who was admitted to the hospital on 3/14/19 with weakness, also had prior admission. Hospital discharge summary as per below.    89 y.o. old female with h/o HTN, Afib, melena and recent sepsis just discharged from [facility] presents with decreased appetite. [employer] declined the patient for TCU at discharge on 3/13 from [facility] following completion of the [id number] Review.  The patient was discharged home with home care but per patient no one showed up at there home.  Patient reports she returned to the ED as due to her age of 89 and her multiple complex medical comorbidities after recent hospitalization for sepsis was unable to provide independent level of care at home and therefore was in an unsafe environment prompting her to re-presented to the emergency department as she is unable to provide adequate safe care independently at home.     Deconditioning and fatigue per HMS note from recent hospitalization : patient would benefit from TCU in my opinion but her insurance has denied this. She is discharged to home with home care in extremely guarded prognosis.   As stated above patient is 89 with multiple complex, severe medical comorbidities, lives independently until recent acute decompensation for sepsis/illness resulting in current inability to provide adequate and, more importantly, safe care at home.  Furthermore, clear documentation from prior inpatient PT/OT assessments documenting need for TCU care at this time.    Patient once again declined by her insurance company, [employer] for TCU coverage.  Patient now paying out of pocket for TCU which she is being discharged to today.     Recent Melena during her stay concern for upper GI bleed, possibly secondary to aspirin use.  No  history of cirrhosis.    - Hgb stable   -- continue oral PPI therapy indefinately   -- GI seen previously endoscopy should be deferred indefinately  -- avoid ASA indefinitely  - no reports at this time     Atrial fibrillation rate controlled new diagnosis   -- continue metoprolol started last admission   -- defer anticoagulation indefinitely given above     Systolic CHF: TTE with severely reduced EF of 20%. Unclear etiology.  -- Cardiology seen previously started lasix and lisinopril, continue BB  -- CHF follow up as outpatient  - compensated at this time      Essential hypertension home amlodipine and HCTZ stopped at discharge   - Started lisinopril and lasix as above + Metoprolol   -Continue     Recent  Sepsis syndrome: Resolved. Found to be likely urinary source. Completed 5 days levaquin previously      Possible aspiration pneumonitis:  Per abnormal right lung opacities on recent CXR.  VSS negative and no signs or symptoms of aspiration per speech therapy evaluation.        Overall stabilized and discharged to TCU on 3/16/19 for PT, OT, nursing cares, medical management and monitoring.     Today:  She is tired today but overall has noticed improvement. She had a busy day with therapy and coordinating her move to [facility] after her TCU stay. No chest pain. No dizziness. Appetite is fair. No abdominal pain, diarrhea or constipation. No evidence of bleeding in stools. No fever or cough. BPs satisfactory.       Past Medical History:  Past Medical History:   Diagnosis Date     DNR (do not resuscitate) / DNI      HTN (hypertension)      Nonsmoker      Osteoporosis     Completed 5 years of alendronate therapy.        Medications:  Current Outpatient Medications   Medication Sig     acetaminophen (TYLENOL) 325 MG tablet Take 2 tablets (650 mg total) by mouth every 6 (six) hours as needed.     furosemide (LASIX) 40 MG tablet Take 1 tablet (40 mg total) by mouth daily. (Patient taking differently: Take 40 mg by mouth daily.        )     lisinopril (PRINIVIL,ZESTRIL) 5 MG tablet Take 1 tablet (5 mg total) by mouth daily. (Patient taking differently: Take 5 mg by mouth daily.       )     magnesium chloride (SLOW-MAG) 64 mg TbEC delayed-release tablet Take 1 tablet (64 mg total) by mouth daily. (Patient taking differently: Take 64 mg by mouth daily.       )     metoprolol succinate (TOPROL-XL) 100 MG 24 hr tablet Take 1 tablet (100 mg total) by mouth daily. (Patient taking differently: Take 100 mg by mouth daily.       )     omeprazole (PRILOSEC) 20 MG capsule Take 1 capsule (20 mg total) by mouth 2 (two) times a day before meals. (Patient taking differently: Take 20 mg by mouth 2 (two) times a day before meals.       )     potassium chloride (K-DUR,KLOR-CON) 20 MEQ tablet Take 1 tablet (20 mEq total) by mouth daily. (Patient taking differently: Take 20 mEq by mouth daily.       )     tetrahydrozoline (VISINE) 0.05 % ophthalmic solution Apply 1 drop to eye at bedtime as needed.       Physical Exam:   General: Patient is alert elderly pleasant female, no distress.    Vitals: /53, Temp 96.9, Pulse 73, RR 18, O2 sat 95% RA.  HEENT: Head is NCAT. Eyes show no injection or icterus. Nares negative. Oropharynx well hydrated.  Neck: Supple. No tenderness or adenopathy. No JVD.  Lungs: Clear bilaterally. No wheezes.  Cardiovascular: irregular with murmur noted.  Back: No spinal or CVA tenderness.  Abdomen: Soft, no tenderness on exam. Bowel sounds present. No guarding rebound or rigidity.  Extremities: Puffy feet left more than right.  Musculoskeletal: Age related degen changes.   Skin: No rashes.   Psych: Mood appears good.      Labs:  Results for orders placed or performed during the hospital encounter of 03/14/19   Basic Metabolic Panel   Result Value Ref Range    Sodium 141 136 - 145 mmol/L    Potassium 4.0 3.5 - 5.0 mmol/L    Chloride 104 98 - 107 mmol/L    CO2 29 22 - 31 mmol/L    Anion Gap, Calculation 8 5 - 18 mmol/L    Glucose 88 70  - 125 mg/dL    Calcium 8.4 (L) 8.5 - 10.5 mg/dL    BUN 35 (H) 8 - 28 mg/dL    Creatinine 0.91 0.60 - 1.10 mg/dL    GFR MDRD Af Amer >60 >60 mL/min/1.73m2    GFR MDRD Non Af Amer 58 (L) >60 mL/min/1.73m2       Lab Results   Component Value Date    WBC 9.2 03/16/2019    HGB 9.9 (L) 03/16/2019    HCT 31.4 (L) 03/16/2019    MCV 96 03/16/2019     03/16/2019         Assessment/Plan:  1. HTN. On lisinopril, metoprolol. Monitor BPs.  2. CHF/CM. Cont medical management, diuretics. Monitor weights, edema, clinical status. Follow up with cardiology.   3. Afib. New on metoprolol. No anticoagulation secondary to recent GIB. Adequate rate control.  4. GIB. Recheck hgb at 9.9. Stable.   5. Weakness. Working with therapy. Note she will be moving to [place] after her TCU stays due to need for more assistance at home than she used to require.        Electronically signed by: Lora Hernandez MD

## 2021-05-27 NOTE — PATIENT INSTRUCTIONS - HE
Please follow up if you have any further issues.    You may contact me by phone or [email or web address] if you are worsening or if things are not improving.    Thank you for coming in today.

## 2021-05-27 NOTE — PROGRESS NOTES
BP Readings from Last 20 Encounters:   04/12/19 112/50   04/02/19 110/65   03/22/19 125/75   03/18/19 118/64   03/16/19 107/62   03/13/19 115/54   06/05/18 128/72   05/12/17 118/60   07/21/15 142/66

## 2021-05-27 NOTE — TELEPHONE ENCOUNTER
Informed Fabiola per Dr. Sosa it is okay to proceed with Orders being requested: Home Care  Skilled Nurse    1 time a week for 1 week  2 times a week for 3 weeks.    She states an understanding and thanked for the call.

## 2021-05-27 NOTE — TELEPHONE ENCOUNTER
Orders being requested: Home Care  Occupational Therapy    1 time 1 week    2 times 3 weeks  Reason service is needed/diagnosis: to work on ADL'S, household chores and balance.  When are orders needed by: ASAP  Where to send Orders: Phone:  verbal orders to caller  Okay to leave detailed message?  Yes

## 2021-05-27 NOTE — PROGRESS NOTES
[facility]    Facility:   [facility] [[id number]]   Code Status: DNR  PCP: Douglas Sosa MD   Phone: [phone]   Fax: [phone]      CHIEF COMPLAINT/REASON FOR VISIT:  Chief Complaint   Patient presents with     Discharge Summary       HISTORY COURSE:  Pinky is a 89 y.o. female undergoing physical and occupational therapy at [facility].  She is with history of hypertension and daily aspirin use, atrial fibrillation, dilated cardiomyopathy.  She presented to the hospital with 7 days of poor appetite, 24 hours of melena, weakness and shortness of breath.  She was found to have a hemoglobin of 10.8 and evidence of sepsis with new A. fib with RVR.  Per the records she did discharged from [facility] the day before and was declined a TCU by her insurance at that time     Today she is seen for a face to face for discharge. She will be moving to [facility] on 4/3/19 with services and escorts to meals.    She denies chest pain or shortness of breath.  She has not had  melena since coming to this facility.   She reports no pain and has never had any pain.   She does have severe scoliosis.Her weight is down 3.4 pounds in the last 2 weeks.      Review of Systems  Constitutional: Positive for fatigue. Negative for activity change, appetite change and fever.   HENT: Negative for congestion.    Respiratory: Negative for cough, shortness of breath and wheezing.    Cardiovascular: Negative for chest pain and leg swelling.   Gastrointestinal: Negative for abdominal distention, abdominal pain, constipation, diarrhea and nausea.   Genitourinary: Negative for dysuria.   Musculoskeletal: Negative for arthralgias and back pain.        Severe scoliosis   Skin: Negative for color change and wound.   Neurological: Negative for dizziness.   Psychiatric/Behavioral: Negative for agitation, behavioral problems and confusion.          Vitals:    04/02/19 0743   BP:  110/65   Pulse: 80   Resp: 16   Temp: 98.6  F (37  C)   SpO2: 93%   Weight: (!) 93 lb 9.6 oz (42.5 kg)       Physical Exam  Constitutional: She is oriented to person, place, and time. She appears well-developed and well-nourished.   Pleasant woman in no acute distress   HENT:   Head: Normocephalic and atraumatic.   Eyes: Conjunctivae are normal. Pupils are equal, round, and reactive to light.   Neck: Normal range of motion. Neck supple.   Cardiovascular: Normal rate, regular rhythm and normal heart sounds.   No murmur heard.  Pulmonary/Chest: Effort normal and breath sounds normal. She has no wheezes. She has no rales.   Abdominal: Soft. Bowel sounds are normal. She exhibits no distension. There is no tenderness.   Musculoskeletal: Normal range of motion. She exhibits no edema.   Neurological: She is alert and oriented to person, place, and time.   Skin: Skin is warm and dry.   Psychiatric: She has a normal mood and affect. Her behavior is normal.          MEDICATION LIST:  Current Outpatient Medications   Medication Sig     acetaminophen (TYLENOL) 325 MG tablet Take 2 tablets (650 mg total) by mouth every 6 (six) hours as needed.     furosemide (LASIX) 40 MG tablet Take 1 tablet (40 mg total) by mouth daily. (Patient taking differently: Take 40 mg by mouth daily.       )     lisinopril (PRINIVIL,ZESTRIL) 5 MG tablet Take 1 tablet (5 mg total) by mouth daily. (Patient taking differently: Take 5 mg by mouth daily.       )     magnesium chloride (SLOW-MAG) 64 mg TbEC delayed-release tablet Take 1 tablet (64 mg total) by mouth daily. (Patient taking differently: Take 64 mg by mouth daily.       )     metoprolol succinate (TOPROL-XL) 100 MG 24 hr tablet Take 1 tablet (100 mg total) by mouth daily. (Patient taking differently: Take 100 mg by mouth daily.       )     omeprazole (PRILOSEC) 20 MG capsule Take 1 capsule (20 mg total) by mouth 2 (two) times a day before meals. (Patient taking differently: Take 20 mg by mouth  2 (two) times a day before meals.       )     potassium chloride (K-DUR,KLOR-CON) 20 MEQ tablet Take 1 tablet (20 mEq total) by mouth daily. (Patient taking differently: Take 20 mEq by mouth daily.       )     tetrahydrozoline (VISINE) 0.05 % ophthalmic solution Apply 1 drop to eye at bedtime as needed.       DISCHARGE DIAGNOSIS:    ICD-10-CM    1. Essential hypertension I10    2. Atrial fibrillation, unspecified type (H) I48.91    3. Weakness R53.1    4. Melena K92.1        MEDICAL EQUIPMENT NEEDS:  None needed    DISCHARGE PLAN/FACE TO FACE:  I certify that services are/were furnished while this patient was under the care of a physician and that a physician or an allowed non-physician practitioner (NPP), had a face-to-face encounter that meets the physician face-to-face encounter requirements. The encounter was in whole, or in part, related to the primary reason for home health. The patient is confined to his/her home and needs intermittent skilled nursing, physical therapy, speech-language pathology, or the continued need for occupational therapy. A plan of care has been established by a physician and is periodically reviewed by a physician.  Date of Face-to-Face Encounter: 4/2/19    I certify that, based on my findings, the following services are medically necessary home health services: PT/OT/HHA and Nursing It is  also recommended escorts to meals.     My clinical findings support the need for the above skilled services because: She is easily fatigued following recent hospitalization for shortness of breath, melena and weakness.     This patient is homebound because: She is deconditioned and easily fatigued and requiring continued therapy     The patient is, or has been, under my care and I have initiated the establishment of the plan of care. This patient will be followed by a physician who will periodically review the plan of care.    Schedule follow up visit with primary care provider within 7 days to  reestablish care.    Electronically signed by: Mili Hylton CNP     Electronically signed by: Lora Hernandez MD

## 2021-05-27 NOTE — TELEPHONE ENCOUNTER
Orders being requested: Home Care  Skilled Nurse    1 time a week for 1 week    2 times a week for 3 weeks  Reason service is needed/diagnosis: assist and instruct with disease management and medication assistance   When are orders needed by: ASAAKASH  Where to send Orders: Phone:  verbal orders to caller either phone number [email or web address]  Okay to leave detailed message?  Yes

## 2021-05-27 NOTE — TELEPHONE ENCOUNTER
Orders being requested: Physical Therapy, two times a week for three weeks, once a week for one week.  Reason service is needed/diagnosis: weakness  When are orders needed by: as soon as possible  Where to send Orders: verbal  Okay to leave detailed message?  Yes

## 2021-05-27 NOTE — TELEPHONE ENCOUNTER
Phone number for walter, PT, Chang Brar [phone], was incorrect received message number is no longer in service.  Informed Fabiola per Dr. Sosa it is okay to proceed with Orders being requested: Physical Therapy, two times a week for three weeks, once a week for one week.    She states an understanding and states she will inform Walter.

## 2021-05-27 NOTE — TELEPHONE ENCOUNTER
Left message on detailed voice mail per Dr. Sosa it is okay to proceed with Orders being requested: Home Care  Occupational Therapy    1 time 1 week  2 times 3 weeks.

## 2021-05-28 NOTE — PATIENT INSTRUCTIONS - HE
- Avoid high sodium foods, like sauces and processed foods    - Exercise 30 minutes a day, at least 5 days per week    - Weigh yourself daily, first thing in the morning. If your weight goes up more than 2 lbs in 24 hours or 5 lbs in 48 hours give us a call as you would likely be retaining fluid    - Call us for swelling or worsening shortness of breath    - See me in 3 months

## 2021-05-28 NOTE — PROGRESS NOTES
Thank you Dr. Sosa  for asking the [facility] team to see Pinky Duggan in consultation  to evaluate atrial fibrillation and cardiomyopathy.      Assessment/Plan:   Non-ischemic cardiomyopathy - on toprol XL 100mg and lisinopril 5mg with lasix 40mg daily. We discussed a low sodium diet and weighing herself daily at home.     Atrial fibrillation - no known recurrences. Not on anticoagulation due to GI bleeding. We discussed her stroke risk and possible LAAO device but it seems she wishes to avoid aggressive measures at this time.     HTN - controlled    Moderate MR    F/U 6 months     Current History:   Pinky Duggan is a 89 y.o. admitted in March with anorexia and fatigue found to have a UTI, atrial fibrillation with RVR and a dilated cardiomyopathy with an EF of 20% with moderate-severe mitral valve regurgitation. She spontaneously converted in the hospital and was discharged on Toprol XL, lisinopril and furosemide. Anticoagulation was not started due to GI bleeding and anemia.     Pinky now lives in an assisted living where she gets 2 meals/day. She is not adding salt to her foods. Pinky has noted no dizziness or palpitations. She occasionally feels short of breath at night in bed. There is no chest pain, edema, syncope.     Past Medical History:     Past Medical History:   Diagnosis Date     Abnormal ECG      Atrial fibrillation (H)      CHF (congestive heart failure) (H)      DNR (do not resuscitate) / DNI      HTN (hypertension)      Nonsmoker      Osteoporosis     Completed 5 years of alendronate therapy.       Past Surgical History:   History reviewed. No pertinent surgical history.    Family History:     Family History   Problem Relation Age of Onset     Stroke Mother      Leukemia Father        Social History:    reports that she has never smoked. She has never used smokeless tobacco. She reports that she does not drink alcohol or use drugs.    Meds:     Current Outpatient  Medications   Medication Sig     furosemide (LASIX) 40 MG tablet Take 1 tablet (40 mg total) by mouth daily.     lisinopril (PRINIVIL,ZESTRIL) 5 MG tablet Take 1 tablet (5 mg total) by mouth daily.     metoprolol succinate (TOPROL-XL) 100 MG 24 hr tablet Take 1 tablet (100 mg total) by mouth daily.     omeprazole (PRILOSEC) 20 MG capsule Take 1 capsule (20 mg total) by mouth 2 (two) times a day before meals.     potassium chloride (K-DUR,KLOR-CON) 20 MEQ tablet Take 1 tablet (20 mEq total) by mouth daily.     tetrahydrozoline (VISINE) 0.05 % ophthalmic solution Apply 1 drop to eye at bedtime as needed.       Allergies:   Penicillins    Review of Systems:   Review of Systems:   General: WNL  Eyes: WNL  Ears/Nose/Throat: WNL  Lungs: WNL  Heart: Irregular Heartbeat  Stomach: Constipation  Bladder: WNL  Muscle/Joints: WNL  Skin: WNL  Nervous System: WNL  Mental Health: WNL     Blood: WNL       Objective:      Physical Exam  @LASTENCWT:3@  5' (1.524 m)  @BMI:3@  /70 (Patient Site: Left Arm, Patient Position: Sitting, Cuff Size: Adult Small)   Pulse 62   Resp 16   Ht 5' (1.524 m)   Wt (!) 98 lb (44.5 kg)   BMI 19.14 kg/m      General Appearance:   Alert, cooperative and in no acute distress.   HEENT:  No scleral icterus; the mucous membranes were pink and moist.   Neck: JVP flat. No thyromegaly. No HJR   Chest: The spine was straight. The chest was symmetric.   Lungs:   Respirations unlabored; the lungs are clear to auscultation.   Cardiovascular:   S1 and S2 normal and with apical systolic murmur. Some single ectopy. No clicks or rubs. No carotid bruits noted. Right DP, PT, and radial pulses 2+. Left DP, PT, and radial pulses 2+.   Abdomen:  No organomegaly, masses, bruits, or tenderness. Bowels sounds are present   Extremities: No cyanosis, clubbing, or edema.   Skin: No xanthelasma.   Neurologic: Mood and affect are appropriate.         Lab Review   Lab Results   Component Value Date     04/12/2019      03/16/2019     03/15/2019    K 4.8 04/12/2019    K 4.0 03/16/2019    K 4.0 03/15/2019     04/12/2019     03/16/2019     03/15/2019    CO2 27 04/12/2019    CO2 29 03/16/2019    CO2 30 03/15/2019    BUN 24 04/12/2019    BUN 35 (H) 03/16/2019    BUN 40 (H) 03/15/2019    CREATININE 0.88 04/12/2019    CREATININE 0.91 03/16/2019    CREATININE 0.98 03/15/2019    CALCIUM 9.5 04/12/2019    CALCIUM 8.4 (L) 03/16/2019    CALCIUM 8.6 03/15/2019     Lab Results   Component Value Date    WBC 9.0 04/12/2019    WBC 9.2 03/16/2019    WBC 10.0 03/15/2019    WBC 8.0 07/21/2015    HGB 13.2 04/12/2019    HGB 9.9 (L) 03/16/2019    HGB 9.9 (L) 03/15/2019    HCT 38.6 04/12/2019    HCT 31.4 (L) 03/16/2019    HCT 29.9 (L) 03/15/2019    MCV 92 04/12/2019    MCV 96 03/16/2019    MCV 93 03/15/2019     04/12/2019     03/16/2019     03/15/2019     Lab Results   Component Value Date    CHOL 196 10/06/2009    TRIG 128 10/06/2009    HDL 70 (H) 10/06/2009     Lab Results   Component Value Date    BNP 3,281 (H) 04/12/2019    BNP 1,935 (H) 03/08/2019         Marlena Francois M.D.

## 2021-05-29 NOTE — PATIENT INSTRUCTIONS - HE
Future Appointments   Date Time Provider Department Center   10/22/2019  9:40 AM Douglas Sosa MD [facility]

## 2021-05-30 NOTE — ANESTHESIA CARE TRANSFER NOTE
Last vitals:   Vitals:    06/28/19 1757   BP: (!) 120/92   Pulse: 77   Resp: 16   Temp: 36.9  C (98.4  F)   SpO2: 100%     Patient's level of consciousness is drowsy  Spontaneous respirations: yes  Maintains airway independently: yes  Dentition unchanged: yes  Oropharynx: oropharynx clear of all foreign objects    QCDR Measures:  ASA# 20 - Surgical Safety Checklist: WHO surgical safety checklist completed prior to induction    PQRS# 430 - Adult PONV Prevention: Pt not able to receive zofran due to EKG  ASA# 8 - Peds PONV Prevention: NA - Not pediatric patient, not GA or 2 or more risk factors NOT present  PQRS# 424 - Catrachita-op Temp Management: 4559F - At least one body temp DOCUMENTED => 35.5C or 95.9F within required timeframe  PQRS# 426 - PACU Transfer Protocol: - Transfer of care checklist used  ASA# 14 - Acute Post-op Pain: ASA14B - Patient did NOT experience pain >= 7 out of 10

## 2021-05-30 NOTE — PROGRESS NOTES
[facility]      Facility:    [facility] [[id number]]  Code Status: DNR      Chief Complaint/Reason for Visit:  Chief Complaint   Patient presents with     Follow Up       HPI:   Pinky is a 89 y.o. female who, I see today for initial TCU follow up.  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 .  She was recently hospitalized at [facility] 6/27 to 7/3/2019 for a mechanical fall with a hip fracture.  She had an ORIF with pins and nails.  Her hospitalization was complicated by prolonged QTc with LBBB.  She was found to have  hypomagnesemia which resolved with supplementation.     Today, she reports her pain is managed by tylenol however she feels she may wait too long to take it.  She has no edema and her incision is well approximated with staples.  She has no drainage, edema or erythema.  She has good CMS and denies s/s of DVT.  Her BPs are well managed with SBPs 120-130s.       Past Medical History:  Past Medical History:   Diagnosis Date     Abnormal ECG      Atrial fibrillation (H)      CHF (congestive heart failure) (H)      DNR (do not resuscitate) / DNI      HTN (hypertension)      Nonsmoker      Osteoporosis     Completed 5 years of alendronate therapy.           Surgical History:  Past Surgical History:   Procedure Laterality Date     APPENDECTOMY       MI PERCUT FIX PROX/NECK FEMUR FX Left 6/28/2019    Procedure: INTERNAL FIXATION, FRACTURE, HIP, WITH PINS OR NAILS;  Surgeon: Rohith Galeano MD;  [place] [facility] [place]: [place]oped[place]       Family History:   Family History   Problem Relation Age of Onset     Stroke Mother      Leukemia Father        Social History:    Social History     Socioeconomic History     Marital status:      Spouse name: Not on file     Number of children: 0     Years of education: Not on  file     Highest education level: Not on file   Occupational History     Occupation: Teacher,      Employer: RETIRED   Social Needs     Financial resource strain: Not on file     Food insecurity:     Worry: Not on file     Inability: Not on file     Transportation needs:     Medical: Not on file     Non-medical: Not on file   Tobacco Use     Smoking status: Never Smoker     Smokeless tobacco: Never Used   Substance and Sexual Activity     Alcohol use: Yes     Comment: not regularly     Drug use: No     Sexual activity: Not on file   Lifestyle     Physical activity:     Days per week: Not on file     Minutes per session: Not on file     Stress: Not on file   Relationships     Social connections:     Talks on phone: Not on file     Gets together: Not on file     Attends Judaism service: Not on file     Active member of club or organization: Not on file     Attends meetings of clubs or organizations: Not on file     Relationship status: Not on file     Intimate partner violence:     Fear of current or ex partner: Not on file     Emotionally abused: Not on file     Physically abused: Not on file     Forced sexual activity: Not on file   Other Topics Concern     Not on file   Social History Narrative    Former patient of Dr. Damaris Billings.        No children.  Former .        POA for medical decisions is Araceli Villarreal.        Contact Alida Villarreal for results or issues - may leave message.    (Cell) - [phone]    Home phone - [phone]        Moved to [facility] in 2019.  Walks with a walker.  Remains active.          Review of Systems   Patient denies fever, chills, headache, lightheadedness, dizziness, rhinorrhea, cough, congestion, shortness of breath, chest pain, palpitations, abdominal pain, n/v, diarrhea, constipation, change in appetite, dysuria, frequency, burning or pain with urination.  Other than stated in HPI all other review of systems is negative.            Physical Exam   Vital signs: /56, HR 72, resp 20, temp 97.6  GENERAL APPEARANCE: Thin, elderly woman in no acute distress.   HEENT: normocephalic, atraumatic  PERRL, sclerae anicteric, conjunctivae clear and moist, EOM intact  Mucous membranes are moist  NECK: Supple and symmetric. Trachea is midline, no thyromegaly, no adenopathy, and no tenderness  LUNGS: Lung sounds CTA, no adventitious sounds, respiratory effort normal.  CARD: RRR, S1, S2, without murmurs, gallops, rubs,   ABD: Soft and nontender with normal bowel sounds.   MSK: Muscle strength and tone were normal.  EXTREMITIES: No cyanosis, clubbing or edema.good cms without s/s of DVT.   NEURO: Alert and oriented x 3. Face is symmetric.  SKIN: left hip incision is well approximated with staples without drainage, erythema or edema.  PSYCH: euthymic          Medication List:  Current Outpatient Medications   Medication Sig     aspirin 325 MG EC tablet Take 1 tablet (325 mg total) by mouth daily.     furosemide (LASIX) 40 MG tablet Take 1 tablet (40 mg total) by mouth daily.     lisinopril (PRINIVIL,ZESTRIL) 5 MG tablet Take 1 tablet (5 mg total) by mouth every evening.     metoprolol succinate (TOPROL-XL) 25 MG Take 3 tablets (75 mg total) by mouth daily.     omeprazole (PRILOSEC) 20 MG capsule Take 1 capsule (20 mg total) by mouth daily before breakfast.       Labs:  Recent Results (from the past 240 hour(s))   ECG 12 lead nursing unit performed   Result Value Ref Range    SYSTOLIC BLOOD PRESSURE  mmHg    DIASTOLIC BLOOD PRESSURE  mmHg    VENTRICULAR RATE 87 BPM    ATRIAL RATE 87 BPM    P-R INTERVAL 184 ms    QRS DURATION 154 ms    Q-T INTERVAL 420 ms    QTC CALCULATION (BEZET) 505 ms    P Axis 92 degrees    R AXIS 28 degrees    T AXIS 139 degrees    MUSE DIAGNOSIS       Sinus rhythm with occasional Premature ventricular complexes  Left bundle branch block  Abnormal ECG  When compared with ECG of 12-MAR-2019 13:47,  Premature ventricular  complexes are now Present  Left bundle branch block is now Present  Confirmed by JANESSA MALONEY MD LOC: ([id number]) on 6/28/2019 10:39:53 AM     Basic Metabolic Panel   Result Value Ref Range    Sodium 140 136 - 145 mmol/L    Potassium 4.4 3.5 - 5.0 mmol/L    Chloride 104 98 - 107 mmol/L    CO2 25 22 - 31 mmol/L    Anion Gap, Calculation 11 5 - 18 mmol/L    Glucose 91 70 - 125 mg/dL    Calcium 9.6 8.5 - 10.5 mg/dL    BUN 31 (H) 8 - 28 mg/dL    Creatinine 1.13 (H) 0.60 - 1.10 mg/dL    GFR MDRD Af Amer 54 (L) >60 mL/min/1.73m2    GFR MDRD Non Af Amer 45 (L) >60 mL/min/1.73m2   INR   Result Value Ref Range    INR 1.06 0.90 - 1.10   BNP(B-type Natriuretic Peptide)   Result Value Ref Range    BNP 1,668 (H) 0 - 167 pg/mL   HM1 (CBC with Diff)   Result Value Ref Range    WBC 13.6 (H) 4.0 - 11.0 thou/uL    RBC 4.04 3.80 - 5.40 mill/uL    Hemoglobin 11.6 (L) 12.0 - 16.0 g/dL    Hematocrit 36.4 35.0 - 47.0 %    MCV 90 80 - 100 fL    MCH 28.7 27.0 - 34.0 pg    MCHC 31.9 (L) 32.0 - 36.0 g/dL    RDW 13.9 11.0 - 14.5 %    Platelets 236 140 - 440 thou/uL    MPV 10.5 8.5 - 12.5 fL    Neutrophils % 81 (H) 50 - 70 %    Lymphocytes % 10 (L) 20 - 40 %    Monocytes % 9 2 - 10 %    Eosinophils % 1 0 - 6 %    Basophils % 0 0 - 2 %    Neutrophils Absolute 10.9 (H) 2.0 - 7.7 thou/uL    Lymphocytes Absolute 1.3 0.8 - 4.4 thou/uL    Monocytes Absolute 1.2 (H) 0.0 - 0.9 thou/uL    Eosinophils Absolute 0.1 0.0 - 0.4 thou/uL    Basophils Absolute 0.1 0.0 - 0.2 thou/uL   APTT  (if on Coumadin)   Result Value Ref Range    PTT 33 24 - 37 seconds   Magnesium   Result Value Ref Range    Magnesium 1.9 1.8 - 2.6 mg/dL   Type and Screen   Result Value Ref Range    ABORh O POS     Antibody Screen Negative Negative   Basic metabolic panel (If not done in ED)   Result Value Ref Range    Sodium 140 136 - 145 mmol/L    Potassium 3.9 3.5 - 5.0 mmol/L    Chloride 105 98 - 107 mmol/L    CO2 29 22 - 31 mmol/L    Anion Gap, Calculation 6 5 - 18 mmol/L    Glucose  99 70 - 125 mg/dL    Calcium 9.3 8.5 - 10.5 mg/dL    BUN 27 8 - 28 mg/dL    Creatinine 0.85 0.60 - 1.10 mg/dL    GFR MDRD Af Amer >60 >60 mL/min/1.73m2    GFR MDRD Non Af Amer >60 >60 mL/min/1.73m2   Hemoglobin   Result Value Ref Range    Hemoglobin 11.4 (L) 12.0 - 16.0 g/dL   Protime-INR (if on Coumadin)   Result Value Ref Range    INR 1.12 (H) 0.90 - 1.10   APTT  (if on Coumadin)   Result Value Ref Range    PTT 32 24 - 37 seconds   Magnesium   Result Value Ref Range    Magnesium 1.8 1.8 - 2.6 mg/dL   Protime-INR (if on Coumadin)   Result Value Ref Range    INR 1.14 (H) 0.90 - 1.10   APTT  (if on Coumadin)   Result Value Ref Range    PTT 38 (H) 24 - 37 seconds   Magnesium   Result Value Ref Range    Magnesium 1.5 (L) 1.8 - 2.6 mg/dL   HGB   Result Value Ref Range    Hemoglobin 10.7 (L) 12.0 - 16.0 g/dL   Basic Metabolic Panel   Result Value Ref Range    Sodium 140 136 - 145 mmol/L    Potassium 3.8 3.5 - 5.0 mmol/L    Chloride 104 98 - 107 mmol/L    CO2 30 22 - 31 mmol/L    Anion Gap, Calculation 6 5 - 18 mmol/L    Glucose 74 70 - 125 mg/dL    Calcium 8.7 8.5 - 10.5 mg/dL    BUN 21 8 - 28 mg/dL    Creatinine 0.85 0.60 - 1.10 mg/dL    GFR MDRD Af Amer >60 >60 mL/min/1.73m2    GFR MDRD Non Af Amer >60 >60 mL/min/1.73m2   HM2(CBC w/o Differential)   Result Value Ref Range    WBC 9.5 4.0 - 11.0 thou/uL    RBC 3.85 3.80 - 5.40 mill/uL    Hemoglobin 10.9 (L) 12.0 - 16.0 g/dL    Hematocrit 35.6 35.0 - 47.0 %    MCV 93 80 - 100 fL    MCH 28.3 27.0 - 34.0 pg    MCHC 30.6 (L) 32.0 - 36.0 g/dL    RDW 14.2 11.0 - 14.5 %    Platelets 185 140 - 440 thou/uL    MPV 10.8 8.5 - 12.5 fL   Comprehensive Metabolic Panel   Result Value Ref Range    Sodium 137 136 - 145 mmol/L    Potassium 4.0 3.5 - 5.0 mmol/L    Chloride 102 98 - 107 mmol/L    CO2 27 22 - 31 mmol/L    Anion Gap, Calculation 8 5 - 18 mmol/L    Glucose 77 70 - 125 mg/dL    BUN 23 8 - 28 mg/dL    Creatinine 0.92 0.60 - 1.10 mg/dL    GFR MDRD Af Amer >60 >60 mL/min/1.73m2     GFR MDRD Non Af Amer 57 (L) >60 mL/min/1.73m2    Bilirubin, Total 0.3 0.0 - 1.0 mg/dL    Calcium 8.7 8.5 - 10.5 mg/dL    Protein, Total 6.0 6.0 - 8.0 g/dL    Albumin 2.8 (L) 3.5 - 5.0 g/dL    Alkaline Phosphatase 54 45 - 120 U/L    AST 14 0 - 40 U/L    ALT <9 0 - 45 U/L   Magnesium   Result Value Ref Range    Magnesium 1.9 1.8 - 2.6 mg/dL   Magnesium   Result Value Ref Range    Magnesium 2.1 1.8 - 2.6 mg/dL   HM2(CBC w/o Differential)   Result Value Ref Range    WBC 8.2 4.0 - 11.0 thou/uL    RBC 3.95 3.80 - 5.40 mill/uL    Hemoglobin 11.2 (L) 12.0 - 16.0 g/dL    Hematocrit 36.4 35.0 - 47.0 %    MCV 92 80 - 100 fL    MCH 28.4 27.0 - 34.0 pg    MCHC 30.8 (L) 32.0 - 36.0 g/dL    RDW 14.4 11.0 - 14.5 %    Platelets 215 140 - 440 thou/uL    MPV 10.6 8.5 - 12.5 fL   Basic Metabolic Panel   Result Value Ref Range    Sodium 139 136 - 145 mmol/L    Potassium 3.9 3.5 - 5.0 mmol/L    Chloride 105 98 - 107 mmol/L    CO2 28 22 - 31 mmol/L    Anion Gap, Calculation 6 5 - 18 mmol/L    Glucose 81 70 - 125 mg/dL    Calcium 8.8 8.5 - 10.5 mg/dL    BUN 22 8 - 28 mg/dL    Creatinine 0.79 0.60 - 1.10 mg/dL    GFR MDRD Af Amer >60 >60 mL/min/1.73m2    GFR MDRD Non Af Amer >60 >60 mL/min/1.73m2         Assessment:    ICD-10-CM    1. S/P ORIF (open reduction internal fixation) fracture Z96.7     Z87.81    2. S/p left hip fracture Z87.81    3. Essential hypertension I10    4. Cardiomyopathy, unspecified type (H) I42.9    5. Paroxysmal atrial fibrillation (H) I48.0    6. CKD (chronic kidney disease) stage 2, GFR 60-89 ml/min N18.2        Plan:  At this time, her hip ORIF is stable.  Continue with therapies.  Schedule tylenol 1000mg three times a day.Counseled patient on pain management and to continue with good mobility to improve function and pain.  F/u with ortho.  If F/u is after 7/13 consider having nursing remove staples.     HTN: stable continue with metoprolol ER 75mg, lisinopril 5mg     Cardiomyopathy/CHF: continue with BP control  and lasix 40mg daily.   ASA 81mg for prevention    Afib: metoprolol ER 75mg, on ASA, no anticoagulation due to fall.     CKD: will need to check BMP next week.     35 minutes total time with 30 minutes spent with patient in coordination and counseling of the above plan of care.       Electronically signed by: Alias Orellana CNP

## 2021-05-30 NOTE — PROGRESS NOTES
[facility] Patient Outreach:     Discharge Date::  7/18/19      Reason for TCU stay (discharge diagnosis)::  Left displaced femoral neck fracture- s/p surgerym nonischemic cardiomyopathy      Are you feeling better, the same or worse since your discharge?:  Patient is feeling better          As part of your discharge plan, did they discuss home care with you?: Yes        Have your seen them yet, or are they scheduled to visit?: Yes                Do you have any follow up visits scheduled with your PCP or Specialist?:  Yes, with PCP      (RN) Is it scheduled soon enough (3-5 days)?: No        (RN) Is the patient okay with moving appointment up (if RN feels appropriate)?: No (will talk to her neice and see if they would like to bee seen sooner by her PCP)

## 2021-05-30 NOTE — TELEPHONE ENCOUNTER
"Medication Question or Clarification  Who is calling: Other: [facility]  What medication are you calling about? (include dose and sig)    Disp Refills Start End    metoprolol succinate (TOPROL-XL) 25 MG  0 7/2/2019     Sig - Route: Take 3 tablets (75 mg total) by mouth daily. - Oral    Class: No Print      Who prescribed the medication?: Viraj Garsia MBBS- Caller stated the patient was placed on this when she was in the hospital and went to a TCU.  What is your question/concern?: Caller stated the patient has recently returned from a TCU and was informed that the above medication is not covered. Caller is questioning if Douglas Sosa MD would be willing to change the medication and send a new Rx for one of the following medications that are covered?    Caller reported she was informed the following medication is covered by the patient's insurance\"  1. Metoprolol Tartrate  2. Atenolol   3. Carvedolol    Pharmacy: [facility] [place].  Okay to leave a detailed message?: Yes  [phone]  Site CMT - Please call the pharmacy to obtain any additional needed information.    Please also fax an order for the new medication so that the staff can dispense the medication to the patient.    Please fax to: [phone]  "

## 2021-05-30 NOTE — TELEPHONE ENCOUNTER
Who is calling:  Gosia Juanito [facility] - [facility]  Reason for Call:  Caller requesting to verify if the below requested orders had been approved by the patient's provider.  Caller stated she thinks she had taken a call but forgot to writer down the approval.      Writer verified that orders had indeed been approved by Douglas Sosa MD   Date of last appointment with primary care: N/a  Okay to leave a detailed message: No

## 2021-05-30 NOTE — PROGRESS NOTES
[facility]    Facility:   [facility] [[id number]]   Code Status: DNR      CHIEF COMPLAINT/REASON FOR VISIT:  Chief Complaint   Patient presents with     Review Of Multiple Medical Conditions       HISTORY:      HPI: Pinky is a 89 y.o. female undergoing physical and occupational therapy at [facility]. She is with history of osteoporosis and recently diagnosed nonischemic cardiomyopathy with ejection fraction of 20% as well as mitral valve disease admitted to the hospital with acute left femoral neck fracture from a fall. She underwent closed reduction and percutaneous screw fixation of L femoral neck fracture    Today she is seen for a routine first visit to review multiple medical issues. She denied CP or shortness of breath. She is moving her bowels and denied urinary issues. She had no cough or congestion. Left hip incision is with staples C/D/I. Her weights are stable and she reports pain controlled.     Past Medical History:   Diagnosis Date     Abnormal ECG      Atrial fibrillation (H)      CHF (congestive heart failure) (H)      DNR (do not resuscitate) / DNI      HTN (hypertension)      Nonsmoker      Osteoporosis     Completed 5 years of alendronate therapy.             Family History   Problem Relation Age of Onset     Stroke Mother      Leukemia Father      Social History     Socioeconomic History     Marital status:      Spouse name: Not on file     Number of children: 0     Years of education: Not on file     Highest education level: Not on file   Occupational History     Occupation: Teacher,      Employer: RETIRED   Social Needs     Financial resource strain: Not on file     Food insecurity:     Worry: Not on file     Inability: Not on file     Transportation needs:     Medical: Not on file     Non-medical: Not on file   Tobacco Use     Smoking status: Never Smoker     Smokeless tobacco: Never Used   Substance and  Sexual Activity     Alcohol use: Yes     Comment: not regularly     Drug use: No     Sexual activity: Not on file   Lifestyle     Physical activity:     Days per week: Not on file     Minutes per session: Not on file     Stress: Not on file   Relationships     Social connections:     Talks on phone: Not on file     Gets together: Not on file     Attends Jainism service: Not on file     Active member of club or organization: Not on file     Attends meetings of clubs or organizations: Not on file     Relationship status: Not on file     Intimate partner violence:     Fear of current or ex partner: Not on file     Emotionally abused: Not on file     Physically abused: Not on file     Forced sexual activity: Not on file   Other Topics Concern     Not on file   Social History Narrative    Former patient of Dr. Damaris Billings.        No children.  Former .        POA for medical decisions is Araceli Villarreal.        Contact Alida Villarreal for results or issues - may leave message.    (cell) - [phone]    Home phone - [phone]        Moved to [facility] in 2019.  Walks with a walker.  Remains active.         Review of Systems   Constitutional: Positive for fatigue. Negative for activity change, appetite change and fever.   HENT: Negative for congestion.    Respiratory: Negative for cough, shortness of breath and wheezing.    Cardiovascular: Negative for chest pain and leg swelling.   Gastrointestinal: Negative for abdominal distention, abdominal pain, constipation, diarrhea and nausea.   Genitourinary: Negative for dysuria.   Musculoskeletal: Positive for arthralgias. Negative for back pain.   Skin: Positive for wound. Negative for color change.   Neurological: Negative for dizziness.   Psychiatric/Behavioral: Negative for agitation, behavioral problems and confusion.       .  Vitals:    07/09/19 0842   BP: 139/86   Pulse: 83   Resp: 16   Temp: 97.5  F (36.4  C)   SpO2: 95%   Weight: 101 lb (45.8  kg)       Physical Exam   Constitutional: She is oriented to person, place, and time. She appears well-developed and well-nourished.   Pleasant woman in no acute distress   HENT:   Head: Normocephalic and atraumatic.   Eyes: Pupils are equal, round, and reactive to light. Conjunctivae are normal.   Neck: Normal range of motion. Neck supple.   Cardiovascular: Normal rate, regular rhythm and normal heart sounds.   No murmur heard.  Pulmonary/Chest: Effort normal and breath sounds normal. She has no wheezes. She has no rales.   Abdominal: Soft. Bowel sounds are normal. She exhibits no distension. There is no tenderness.   Musculoskeletal: Normal range of motion. She exhibits no edema.   Neurological: She is alert and oriented to person, place, and time.   Skin: Skin is warm and dry.   Left hip incision with staples C/D/I  Fading bruise left cheek.    Psychiatric: She has a normal mood and affect. Her behavior is normal.         LABS:   Recent Results (from the past 240 hour(s))   HM2(CBC w/o Differential)   Result Value Ref Range    WBC 9.5 4.0 - 11.0 thou/uL    RBC 3.85 3.80 - 5.40 mill/uL    Hemoglobin 10.9 (L) 12.0 - 16.0 g/dL    Hematocrit 35.6 35.0 - 47.0 %    MCV 93 80 - 100 fL    MCH 28.3 27.0 - 34.0 pg    MCHC 30.6 (L) 32.0 - 36.0 g/dL    RDW 14.2 11.0 - 14.5 %    Platelets 185 140 - 440 thou/uL    MPV 10.8 8.5 - 12.5 fL   Comprehensive Metabolic Panel   Result Value Ref Range    Sodium 137 136 - 145 mmol/L    Potassium 4.0 3.5 - 5.0 mmol/L    Chloride 102 98 - 107 mmol/L    CO2 27 22 - 31 mmol/L    Anion Gap, Calculation 8 5 - 18 mmol/L    Glucose 77 70 - 125 mg/dL    BUN 23 8 - 28 mg/dL    Creatinine 0.92 0.60 - 1.10 mg/dL    GFR MDRD Af Amer >60 >60 mL/min/1.73m2    GFR MDRD Non Af Amer 57 (L) >60 mL/min/1.73m2    Bilirubin, Total 0.3 0.0 - 1.0 mg/dL    Calcium 8.7 8.5 - 10.5 mg/dL    Protein, Total 6.0 6.0 - 8.0 g/dL    Albumin 2.8 (L) 3.5 - 5.0 g/dL    Alkaline Phosphatase 54 45 - 120 U/L    AST 14 0 - 40  U/L    ALT <9 0 - 45 U/L   Magnesium   Result Value Ref Range    Magnesium 1.9 1.8 - 2.6 mg/dL   Magnesium   Result Value Ref Range    Magnesium 2.1 1.8 - 2.6 mg/dL   HM2(CBC w/o Differential)   Result Value Ref Range    WBC 8.2 4.0 - 11.0 thou/uL    RBC 3.95 3.80 - 5.40 mill/uL    Hemoglobin 11.2 (L) 12.0 - 16.0 g/dL    Hematocrit 36.4 35.0 - 47.0 %    MCV 92 80 - 100 fL    MCH 28.4 27.0 - 34.0 pg    MCHC 30.8 (L) 32.0 - 36.0 g/dL    RDW 14.4 11.0 - 14.5 %    Platelets 215 140 - 440 thou/uL    MPV 10.6 8.5 - 12.5 fL   Basic Metabolic Panel   Result Value Ref Range    Sodium 139 136 - 145 mmol/L    Potassium 3.9 3.5 - 5.0 mmol/L    Chloride 105 98 - 107 mmol/L    CO2 28 22 - 31 mmol/L    Anion Gap, Calculation 6 5 - 18 mmol/L    Glucose 81 70 - 125 mg/dL    Calcium 8.8 8.5 - 10.5 mg/dL    BUN 22 8 - 28 mg/dL    Creatinine 0.79 0.60 - 1.10 mg/dL    GFR MDRD Af Amer >60 >60 mL/min/1.73m2    GFR MDRD Non Af Amer >60 >60 mL/min/1.73m2       ASSESSMENT:      ICD-10-CM    1. S/P ORIF (open reduction internal fixation) fracture Z96.7     Z87.81    2. Essential hypertension I10    3. Paroxysmal atrial fibrillation (H) I48.0        PLAN:    S/P  ORIF left  Hip- PT/OT, pain control, monitor incision  for S/S infection  Hypertension - on metoprolol, lisinopril, lasix  Atrial fibrillation continue metoprolol, ASA  Pain  Control- on Tylenol  Anticoagulation therapy on 325 mg ASA daily   GERD on omeprazole     Electronically signed by: Mili Hylton CNP

## 2021-05-30 NOTE — PROGRESS NOTES
[facility]      Facility:    [facility] [[id number]]  Code Status: DNR       Chief Complaint/Reason for Visit:  Chief Complaint   Patient presents with     H & P     Admit note to TCU after left hip fracture surgery.        HPI:   Pinky is a 89 y.o. female who was admitted to the hospital on 6/27/19 after a fall in which she sustained a left hip fracture. Her hospital info is partially excerpted below.     HOSPITAL HPI  Pinky Duggan is a 89 y.o. 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  Patient's primary historian, her nephew and his wife are in the room as well and corroborate history.  She was in her apartment.  She was using a walker although states she does not use it all the time.  She somehow twisted her foot when getting up to go to the bathroom and fell forward.  She did not lose consciousness.  She remembers all of the events.  She was able to get back up and felt some twinge of something involving her left hip.  However she walked rate down to lunch using her walker.  She walked around some more.  She was mostly worried about a bruise on her left cheek.  She really does not endorse any left hip pain but knew that she might have injured something so asked to be brought in for further evaluation.  Emergency room evaluation found a left hip fracture and she is being admitted.     She has never had general anesthesia that she can remember.  She remains fairly active in assisted living.  She did not know she had any heart issues until she was hospitalized with sepsis earlier this year.  She was found to have an ejection fraction of 20% with mild to moderate mitral valve disease.  She is not interested in aggressive management.  She however is very active.    [facility] SUMMARY  89-year-old female with  history of osteoporosis and recently diagnosed nonischemic cardiomyopathy with ejection fraction of 20% as well as mitral valve disease admitted to the hospital with acute left femoral neck fracture from a fall.     Left femoral neck fracture  -Postoperative day #5 closed reduction and percutaneous screw fixation of L femoral neck fracture  -Postop Ortho recommending WBAT w walker, gait training w PT, ASA 325mg daily for VTE prevention and TCU; clinic visit 2-3 weeks post surgery.  -Continue current pain regimen as prescribed     History of A fib:   -Developed atrial fibrillation while hospitalized for UTI and then spontaneously converted during that hospitalization.  She also had apparent GI bleed during that time as well which is the reason anticoagulation was not initiated.  Per cardiology note 5/2019 patient has had no recurrences.  Patient declined prior left atrial appendage occlusion device as she wished to forego any aggressive measures.  -Currently on aspirin for DVT prophylaxis.  Continue Toprol-XL reduced dose of 75 mg daily.  No indication for telemetry monitoring at this time.     Chronic systolic heart failure, nonischemic:   -Unable to identify any prior ischemic evaluation/work-up but per 5/2019 cardiology note reported as nonischemic.  Transthoracic echocardiogram 3/2019 EF 20%, moderate-severe eccentric mitral regurgitation, mild aortic stenosis with mild aortic insufficiency.    -Compensated currently.  Monitor volume status closely.  Continue Toprol-XL based on current hemodynamics will reduce dose of this to 75 mg daily, changed lisinopril to be given in the evening and continue Lasix.    -Strict intake and output     Prolonged QTc in setting of LBBB  -Monitor electrolytes closely.  -No Zofran.  -Monitor and replace electrolytes     CKD-II  -Cr at baseline, monitor closely     Valvular heart disease:   -Moderate - severe MR, mild aortic stenosis with mild aortic insufficiency:  No evidence for  "pulmonary edema.  Monitor volume status closely.       Recent GI bleed  -3/2019 in setting of sepsis  - Hemoglobin stable   - Cont PPI     Left bundle branch block:   -Noted on preoperative EKG.  Appears new.  No anginal symptoms.     Acute hypomagnesemia:   -Monitor and replace per protocol     DVT prophylaxis  -ECASA 325mg daily, foot pumps, mobilization     Code status  -DNR     Disposition  -Had a peer to peer review with Dr Simeon from [facility] about the placement. After reviewing her case, Dr Simeon declined TCU.   -Patient is obviously not happy with that. She stated she is going to appeal it.   -At the moment, given her age (89 years), recent hip surgery for hip fracture that she sustained from a fall, she would greatly benefit from TCU stay for rehab. However, he insurance felt differently and didn't provide authorization. I do not believe the patient is safe to go back to her assisted living facility in this condition as she lives alone. Assisted living felt the same way and declined to accept her (per social service). She is at high risk of fall that can complicate her post surgical outcome. Per nursing staff (although she walked 80 feet yesterday with assistance of 1), she is still not able to get up from a chair on her own. PT/OT also strongly recommended TCU. PT's summary copied from the Chart:   \"Pt would benefit greatly from transitional care unit prior to returning home. Attempted amb w/ 4WW today (what pt usually uses) and pt was having inc difficulty and pain. Pt did do better with FWW, however still complaining of discomfort with ambulation and feeling unsteady/not safe. Pt having inc difficulty with standing from chair and bed mobility has not been seen during physical therapy sessions. Because pt lives alone and was independent prior to fall/fracture, TCU strongly recommended to prevent future falls or injuries leading to future hospitalizations. \"     The patient is now going to TCU on private " pay; she would appeal the TCU stay with [employer] per report.      Overall stabilized and discharged to TCU on 7/3/19 for PT, OT, nursing cares, medical management and monitoring.     Today:  She is on scheduled tylenol for pain management, able to work with therapy. WBAT allowed. She denies chest pain. No dizziness. Appetite is fair. No abdominal pain, diarrhea or constipation. No fever or cough. BPs satisfactory. She is on aspirin for DVT prevention, monitor closely as she has hx of GIB. No new vision or hearing problems. She lives in [facility] [place].      Past Medical History:  Past Medical History:   Diagnosis Date     Abnormal ECG      Atrial fibrillation (H)      CHF (congestive heart failure) (H)      DNR (do not resuscitate) / DNI      HTN (hypertension)      Nonsmoker      Osteoporosis     Completed 5 years of alendronate therapy.           Surgical History:  Past Surgical History:   Procedure Laterality Date     APPENDECTOMY       OH PERCUT FIX PROX/NECK FEMUR FX Left 6/28/2019    Procedure: INTERNAL FIXATION, FRACTURE, HIP, WITH PINS OR NAILS;  Surgeon: Rohith Galeano MD;  Location: [facility];  Service: Orthopedics       Family History:   Family History   Problem Relation Age of Onset     Stroke Mother      Leukemia Father        Social History:    Social History     Socioeconomic History     Marital status:      Spouse name: Not on file     Number of children: 0     Years of education: Not on file     Highest education level: Not on file   Occupational History     Occupation: Teacher,      Employer: RETIRED   Social Needs     Financial resource strain: Not on file     Food insecurity:     Worry: Not on file     Inability: Not on file     Transportation needs:     Medical: Not on file     Non-medical: Not on file   Tobacco Use     Smoking status: Never Smoker     Smokeless tobacco: Never Used   Substance and Sexual Activity     Alcohol use: Yes     Comment: not regularly      Drug use: No     Sexual activity: Not on file   Lifestyle     Physical activity:     Days per week: Not on file     Minutes per session: Not on file     Stress: Not on file   Relationships     Social connections:     Talks on phone: Not on file     Gets together: Not on file     Attends Yazidism service: Not on file     Active member of club or organization: Not on file     Attends meetings of clubs or organizations: Not on file     Relationship status: Not on file     Intimate partner violence:     Fear of current or ex partner: Not on file     Emotionally abused: Not on file     Physically abused: Not on file     Forced sexual activity: Not on file   Other Topics Concern     Not on file   Social History Narrative    Former patient of Dr. Damaris Billings.        No children.  Former .        POA for medical decisions is Araceli Villarreal.        Contact Alida Villarreal for results or issues - may leave message.    (Cell) - [phone]    Home phone - [phone]        Moved to [facility] in 2019.  Walks with a walker.  Remains active.        Medications;  Current Outpatient Medications   Medication Sig     aspirin 325 MG EC tablet Take 1 tablet (325 mg total) by mouth daily.     furosemide (LASIX) 40 MG tablet Take 1 tablet (40 mg total) by mouth daily.     lisinopril (PRINIVIL,ZESTRIL) 5 MG tablet Take 1 tablet (5 mg total) by mouth every evening.     metoprolol succinate (TOPROL-XL) 25 MG Take 3 tablets (75 mg total) by mouth daily.     omeprazole (PRILOSEC) 20 MG capsule Take 1 capsule (20 mg total) by mouth daily before breakfast.       Allergies:  Allergies   Allergen Reactions     Penicillins Swelling       Review of Systems:  Pertinent items are noted in HPI.      Physical Exam:   General: Patient is alert, pleasant elderly female, no distress.   Vitals: /86, Temp 97.5, Pulse 78, RR 20, O2 sat 96% RA.  HEENT: Head is NCAT. Eyes show no injection or icterus. Nares negative.  Oropharynx well hydrated.  Neck: Supple. No tenderness or adenopathy. No JVD.  Lungs: Clear bilaterally. No wheezes.  Cardiovascular: Regular rate and rhythm, murmur noted.  Back: No spinal or CVA tenderness.  Abdomen: Soft, no tenderness on exam. Bowel sounds present. No guarding rebound or rigidity.  : Deferred.  Extremities: No signif distal edema is noted.  Musculoskeletal: Age related degen changes.  Skin: Surgical incisions left hip covered.  Psych: Mood appears good.      Labs:  Lab Results   Component Value Date    WBC 8.2 07/02/2019    HGB 11.2 (L) 07/02/2019    HCT 36.4 07/02/2019    MCV 92 07/02/2019     07/02/2019     Results for orders placed or performed during the hospital encounter of 06/27/19   Basic Metabolic Panel   Result Value Ref Range    Sodium 139 136 - 145 mmol/L    Potassium 3.9 3.5 - 5.0 mmol/L    Chloride 105 98 - 107 mmol/L    CO2 28 22 - 31 mmol/L    Anion Gap, Calculation 6 5 - 18 mmol/L    Glucose 81 70 - 125 mg/dL    Calcium 8.8 8.5 - 10.5 mg/dL    BUN 22 8 - 28 mg/dL    Creatinine 0.79 0.60 - 1.10 mg/dL    GFR MDRD Af Amer >60 >60 mL/min/1.73m2    GFR MDRD Non Af Amer >60 >60 mL/min/1.73m2       Assessment/Plan:  1. Left hip fracture. Sustained in a fall. S/p surgery on 6/28/19. WBAT, continue in therapy. Follow up with ortho.   2. CM. Reduced EF. No chest pain or hypoxia or shortness of breath. Monitor clinically. Continue lasix.   3. Afib. Adequate rate control. Not on AC typically, currently on aspirin for DVT prevention post op.  4. HTN. On lisinopril. Monitor BPs.   5. CKD. Labs as noted.  6. Anemia. Mild ABLA from surgery.  7. Hx GIB. Cont PPI. Monitor closely.  8. Code status is DNR.             Electronically signed by: Lora Hernandez MD

## 2021-05-30 NOTE — ANESTHESIA PREPROCEDURE EVALUATION
"Anesthesia Evaluation      Patient summary reviewed   No history of anesthetic complications     Airway   Mallampati: II  Neck ROM: limited   Pulmonary - negative ROS and normal exam                          Cardiovascular   Exercise tolerance: > or = 4 METS  (+) hypertension, dysrhythmias, CHF, cardiomyopathy,     (-) angina  Rhythm: regular  Rate: normal,    murmur Location:upper left sternal border      Neuro/Psych - negative ROS     Endo/Other - negative ROS      GI/Hepatic/Renal - negative ROS      Other findings: Was hospitalized in March for GI bleed, echo on 3/9/19 showed non-ischemic cardiomyopathy, EF 20%, mod-severe MR, mild AS/AI, and moderate LAE.  She lives independently.  Hg 11.4, K 3.9, GFR>60.  Pt currently DNR/DNI.  Discussed their desires with pt and family.  They would like to avoid chest compressions, and while pt is amenable to \"some\" resuscitative efforts, they do not want prolonged efforts. Difficult to auscultate heart sounds as she is so thin her ribs prevent full contact.      Dental - normal exam                        Anesthesia Plan  Planned anesthetic: spinal    ASA 4     Anesthetic plan and risks discussed with: patient and child/children    Post-op plan: routine recovery  DNR/DNI status   .  Suspension of DNR: had lengthy discussion with pt and family, see ROS comments.        "

## 2021-05-30 NOTE — TELEPHONE ENCOUNTER
Call from staff at facility      Status update       She is able to stand / walk on it -- pain currently a 7/10       I did relate message from Dr Sosa      [facility] would seem like a good option - he will reach out to family about transport to [facility]        Gave hrs and locations       Tyler  RN   [phone]           Rivera Martinez, RN   Triage and Medication Refills

## 2021-05-30 NOTE — TELEPHONE ENCOUNTER
Orders being requested: OT   1 week x 1  2 x week 3  1 x week x1  Reason service is needed/diagnosis:  Left hip fracture,  balance & gait safety  When are orders needed by:  Today  Where to send Orders: Phone:  [phone]    Okay to leave detailed message?  Yes

## 2021-05-30 NOTE — TELEPHONE ENCOUNTER
Called and LVM for Tyler on vm stating name and title.  Relayed message from PCP and requested call back if questions

## 2021-05-30 NOTE — ANESTHESIA POSTPROCEDURE EVALUATION
Patient: Pinky Duggan  INTERNAL FIXATION, FRACTURE, HIP, WITH PINS OR NAILS - spinal  Anesthesia type: spinal    Patient location: PACU  Last vitals:   Vitals Value Taken Time   /57 6/28/2019  6:40 PM   Temp 36.7  C (98  F) 6/28/2019  6:30 PM   Pulse 75 6/28/2019  6:44 PM   Resp 22 6/28/2019  6:40 PM   SpO2 98 % 6/28/2019  6:44 PM   Vitals shown include unvalidated device data.  Post vital signs: stable  Level of consciousness: very alert and conversant  Post-anesthesia pain: pain controlled  Post-anesthesia nausea and vomiting: no  Pulmonary: room air  Cardiovascular: stable and blood pressure at baseline  Hydration: adequate  Anesthetic events: no    QCDR Measures:  ASA# 11 - [place]-op Cardiac Arrest: ASA11B - Patient did NOT experience unanticipated cardiac arrest  ASA# 12 - [place]-op Mortality Rate: ASA12B - Patient did NOT die  ASA# 13 - PACU Re-Intubation Rate: ASA13B - Patient did NOT require a new airway mgmt  ASA# 10 - Composite Anes Safety: ASA10A - No serious adverse event    Additional Notes:

## 2021-05-30 NOTE — TELEPHONE ENCOUNTER
FYI - Status Update  Who is Calling: Tyler, nurse with [facility]   Update: Tyler was calling to provide update on patient.  He states patient fell, 06/27/19,and patient got up on her own. Tyler was unsure of specific time of fall just that is was late morning, between 11 & 12.    Patient is now complaining of left hip pain and unsteadiness.  Patient states pain is 7 out of 10 with walking, however no pain with sitting.    Okay to leave a detailed message?:  Yes

## 2021-05-30 NOTE — PROGRESS NOTES
[facility]    Facility:   [facility] [[id number]]   Code Status: DNR  PCP: Douglas Sosa MD   Phone: [phone]   Fax: [phone]      CHIEF COMPLAINT/REASON FOR VISIT:  Chief Complaint   Patient presents with     Discharge Summary       HISTORY COURSE:  Pinky is a 89 y.o. female undergoing physical and occupational therapy at [facility]. She is with history of osteoporosis and recently diagnosed nonischemic cardiomyopathy with ejection fraction of 20% as well as mitral valve disease admitted to the hospital with acute left femoral neck fracture from a fall. She underwent closed reduction and percutaneous screw fixation of L femoral neck fracture     Today she is seen for a face to face for discharge She will discharge to [place] on 7/18 with current medications and treatments. She will have [facility] home care services. PT/OT/HHA and RN.  She denied CP or shortness of breath. She is moving her bowels and denied urinary issues. She had no cough or congestion. Left hip incision is with staples C/D/I. Her weights are stable and she reports pain controlled. SHe will follow up with Orthopedics on Thursday 7/18.    Review of Systems  Constitutional: Positive for fatigue. Negative for activity change, appetite change and fever.   HENT: Negative for congestion.    Respiratory: Negative for cough, shortness of breath and wheezing.    Cardiovascular: Negative for chest pain and leg swelling.   Gastrointestinal: Negative for abdominal distention, abdominal pain, constipation, diarrhea and nausea.   Genitourinary: Negative for dysuria.   Musculoskeletal: Positive for arthralgias. Negative for back pain.   Skin: Positive for wound. Negative for color change.   Neurological: Negative for dizziness.   Psychiatric/Behavioral: Negative for agitation, behavioral problems and confusion.   Vitals:    07/16/19 0922   BP: 124/62   Pulse: 69   Resp: 18   Temp: 97.7   F (36.5  C)   SpO2: 94%   Weight: 104 lb 3.2 oz (47.3 kg)       Physical Exam  Constitutional: She is oriented to person, place, and time. She appears well-developed and well-nourished.   Pleasant woman in no acute distress   HENT:   Head: Normocephalic and atraumatic.   Eyes: Pupils are equal, round, and reactive to light. Conjunctivae are normal.   Neck: Normal range of motion. Neck supple.   Cardiovascular: Normal rate, regular rhythm and normal heart sounds.   No murmur heard.  Pulmonary/Chest: Effort normal and breath sounds normal. She has no wheezes. She has no rales.   Abdominal: Soft. Bowel sounds are normal. She exhibits no distension. There is no tenderness.   Musculoskeletal: Normal range of motion. She exhibits no edema.   Neurological: She is alert and oriented to person, place, and time.   Skin: Skin is warm and dry.   Left hip incision with staples C/D/I  Fading bruise left cheek.    Psychiatric: She has a normal mood and affect. Her behavior is normal.   MEDICATION LIST:  Current Outpatient Medications   Medication Sig     aspirin 325 MG EC tablet Take 1 tablet (325 mg total) by mouth daily.     furosemide (LASIX) 40 MG tablet Take 1 tablet (40 mg total) by mouth daily.     lisinopril (PRINIVIL,ZESTRIL) 5 MG tablet Take 1 tablet (5 mg total) by mouth every evening.     metoprolol succinate (TOPROL-XL) 25 MG Take 3 tablets (75 mg total) by mouth daily.     omeprazole (PRILOSEC) 20 MG capsule Take 1 capsule (20 mg total) by mouth daily before breakfast.       DISCHARGE DIAGNOSIS:    ICD-10-CM    1. Left displaced femoral neck fracture (H) S72.002A    2. Paroxysmal atrial fibrillation (H) I48.0    3. Essential hypertension I10    4. Pain management R52        MEDICAL EQUIPMENT NEEDS:  None     DISCHARGE PLAN/FACE TO FACE:  I certify that services are/were furnished while this patient was under the care of a physician and that a physician or an allowed non-physician practitioner (NPP), had a  face-to-face encounter that meets the physician face-to-face encounter requirements. The encounter was in whole, or in part, related to the primary reason for home health. The patient is confined to his/her home and needs intermittent skilled nursing, physical therapy, speech-language pathology, or the continued need for occupational therapy. A plan of care has been established by a physician and is periodically reviewed by a physician.  Date of Face-to-Face Encounter: 7/16/19    I certify that, based on my findings, the following services are medically necessary home health services: [facility] PT/OT/HHA and RN     My clinical findings support the need for the above skilled services because: PT/OT for continued strength and  endurance following a left hip nailing , HHA to assist with ADL's and RN for VS, Medication management dn left hip wound check      This patient is homebound because: deconditioned and easily fatigued following a left hip nailing. She also requires the use of adaptive equipment     The patient is, or has been, under my care and I have initiated the establishment of the plan of care. This patient will be followed by a physician who will periodically review the plan of care.    Schedule follow up visit with primary care provider within 7 days to reestablish care.    Electronically signed by: Mili Hylton CNP     Electronically signed by: Lora Hernandez MD

## 2021-05-30 NOTE — TELEPHONE ENCOUNTER
Orders being requested: Physical Therapy 1 time a week for 1 week, then 2 times a weeks for 3 weeks, then 1 time a week for 1 weeks  Reason service is needed/diagnosis: Post Fall  Gait instability  When are orders needed by: ASAP  Where to send Orders: Phone:  [phone]  Okay to leave detailed message?  Yes

## 2021-05-30 NOTE — PROGRESS NOTES
[facility]    Facility:   [facility] [[id number]]   Code Status: DNR      CHIEF COMPLAINT/REASON FOR VISIT:  Chief Complaint   Patient presents with     Review Of Multiple Medical Conditions       HISTORY:      HPI: Pinky is a 89 y.o. female undergoing physical and occupational therapy at [facility]. She is with history of osteoporosis and recently diagnosed nonischemic cardiomyopathy with ejection fraction of 20% as well as mitral valve disease admitted to the hospital with acute left femoral neck fracture from a fall. She underwent closed reduction and percutaneous screw fixation of L femoral neck fracture    Today she is seen for a routine  visit to review multiple medical issues. She denied CP or shortness of breath. She had a medium formed stool today and denied urinary issues. She had no cough or congestion. Left hip incision is with staples C/D/I. Her weights are stable and she reports pain controlled. She is with severe scoliosis.     Past Medical History:   Diagnosis Date     Abnormal ECG      Atrial fibrillation (H)      CHF (congestive heart failure) (H)      DNR (do not resuscitate) / DNI      HTN (hypertension)      Nonsmoker      Osteoporosis     Completed 5 years of alendronate therapy.             Family History   Problem Relation Age of Onset     Stroke Mother      Leukemia Father      Social History     Socioeconomic History     Marital status:      Spouse name: Not on file     Number of children: 0     Years of education: Not on file     Highest education level: Not on file   Occupational History     Occupation: Teacher,      Employer: RETIRED   Social Needs     Financial resource strain: Not on file     Food insecurity:     Worry: Not on file     Inability: Not on file     Transportation needs:     Medical: Not on file     Non-medical: Not on file   Tobacco Use     Smoking status: Never Smoker     Smokeless  tobacco: Never Used   Substance and Sexual Activity     Alcohol use: Yes     Comment: not regularly     Drug use: No     Sexual activity: Not on file   Lifestyle     Physical activity:     Days per week: Not on file     Minutes per session: Not on file     Stress: Not on file   Relationships     Social connections:     Talks on phone: Not on file     Gets together: Not on file     Attends Yazidism service: Not on file     Active member of club or organization: Not on file     Attends meetings of clubs or organizations: Not on file     Relationship status: Not on file     Intimate partner violence:     Fear of current or ex partner: Not on file     Emotionally abused: Not on file     Physically abused: Not on file     Forced sexual activity: Not on file   Other Topics Concern     Not on file   Social History Narrative    Former patient of Dr. Damaris Billings.        No children.  Former .        POA for medical decisions is Araceli Villarreal.        Contact Alida Villarreal for results or issues - may leave message.    (Cell) - [phone]    Home phone - [phone]        Moved to [facility] in 2019.  Walks with a walker.  Remains active.         Review of Systems   Constitutional: Positive for fatigue. Negative for activity change, appetite change and fever.   HENT: Negative for congestion.    Respiratory: Negative for cough, shortness of breath and wheezing.    Cardiovascular: Negative for chest pain and leg swelling.   Gastrointestinal: Negative for abdominal distention, abdominal pain, constipation, diarrhea and nausea.   Genitourinary: Negative for dysuria.   Musculoskeletal: Positive for arthralgias. Negative for back pain.   Skin: Positive for wound. Negative for color change.   Neurological: Negative for dizziness.   Psychiatric/Behavioral: Negative for agitation, behavioral problems and confusion.       .  Vitals:    07/16/19 1916   BP: 131/54   Pulse: 70   Resp: 16   Temp: 97.2  F (36.2  C)    SpO2: 97%   Weight: 104 lb 3.2 oz (47.3 kg)       Physical Exam   Constitutional: She is oriented to person, place, and time. She appears well-developed and well-nourished.   Pleasant woman in no acute distress   HENT:   Head: Normocephalic and atraumatic.   Eyes: Pupils are equal, round, and reactive to light. Conjunctivae are normal.   Neck: Normal range of motion. Neck supple.   Cardiovascular: Normal rate, regular rhythm and normal heart sounds.   No murmur heard.  Pulmonary/Chest: Effort normal and breath sounds normal. She has no wheezes. She has no rales.   Abdominal: Soft. Bowel sounds are normal. She exhibits no distension. There is no tenderness.   Musculoskeletal: Normal range of motion. She exhibits no edema.   Neurological: She is alert and oriented to person, place, and time.   Skin: Skin is warm and dry.   Left hip incision with staples C/D/I  Fading bruise left cheek.    Psychiatric: She has a normal mood and affect. Her behavior is normal.         LABS:   No results found for this or any previous visit (from the past 240 hour(s)).    ASSESSMENT:      ICD-10-CM    1. Left displaced femoral neck fracture (H) S72.002A    2. Paroxysmal atrial fibrillation (H) I48.0    3. Essential hypertension I10    4. Debility R53.81        PLAN:    S/P  ORIF left  Hip- PT/OT, pain control, monitor incision  for S/S infection  Hypertension - on metoprolol, lisinopril, lasix  Atrial fibrillation continue metoprolol, ASA  Pain  Control- on Tylenol  Anticoagulation therapy on 325 mg ASA daily   GERD on omeprazole     Electronically signed by: Mili Hylton CNP

## 2021-05-30 NOTE — TELEPHONE ENCOUNTER
Call back -    If she is doing poorly, then she can go into the emergency room (ER) if it is not going well.    Otherwise, it would also be an option to be seen in WALK IN CARE during WALK IN CARE hours.    Douglas Sosa MD  [facility]  6/27/2019, 2:14 PM

## 2021-05-30 NOTE — TELEPHONE ENCOUNTER
Sent in metoprolol tartarate instead.    Douglas Sosa MD  [facility]  7/29/2019, 1:44 PM

## 2021-05-30 NOTE — TELEPHONE ENCOUNTER
Orders being requested: skilled nursing 1 x a week for 5 weeks, OT 2 x a week for 3 weeks, 1 x a week for 1 week  Reason service is needed/diagnosis: pain management, fracture healing  When are orders needed by: [email or web address]  Where to send Orders: Phone:  [phone]  Okay to leave detailed message?  Yes

## 2021-05-31 NOTE — TELEPHONE ENCOUNTER
Left message to call back.    If pt calls back please inform of Dr. Sosa's message and assist with canceling and rescheduling appointment.

## 2021-05-31 NOTE — TELEPHONE ENCOUNTER
Please call patient's niece Sita.  Highlighted number below -    may leave message   ______________________________________________________________________     Other contacts:  Name Home Phone Work Phone Mobile Phone Relationship Lgl Grd   DONI HOPE [phone] Nephew SITA Campbell [phone] Niece No     ______________________________________________________________________     Pinky's blood work is good overall.  Her red blood cell count is improving after surgery.    Her kidney tests and electrolytes are stable except her potassium is slightly high.    She should have her October appointment cancelled and lets have her come back for follow up and blood work in late November or early December.    Douglas Sosa MD  [facility]  8/30/2019, 8:07 PM     ______________________________________________________________________    Recent Results (from the past 240 hour(s))   Basic Metabolic Panel   Result Value Ref Range    Sodium 142 136 - 145 mmol/L    Potassium 5.2 (H) 3.5 - 5.0 mmol/L    Chloride 104 98 - 107 mmol/L    CO2 28 22 - 31 mmol/L    Anion Gap, Calculation 10 5 - 18 mmol/L    Glucose 66 (L) 70 - 125 mg/dL    Calcium 9.4 8.5 - 10.5 mg/dL    BUN 29 (H) 8 - 28 mg/dL    Creatinine 1.13 (H) 0.60 - 1.10 mg/dL    GFR MDRD Af Amer 54 (L) >60 mL/min/1.73m2    GFR MDRD Non Af Amer 45 (L) >60 mL/min/1.73m2   HM2(CBC w/o Differential)   Result Value Ref Range    WBC 9.6 4.0 - 11.0 thou/uL    RBC 4.10 3.80 - 5.40 mill/uL    Hemoglobin 11.5 (L) 12.0 - 16.0 g/dL    Hematocrit 36.5 35.0 - 47.0 %    MCV 89 80 - 100 fL    MCH 28.0 27.0 - 34.0 pg    MCHC 31.4 (L) 32.0 - 36.0 g/dL    RDW 12.8 11.0 - 14.5 %    Platelets 275 140 - 440 thou/uL    MPV 8.2 7.0 - 10.0 fL       ______________________________________________________________________

## 2021-06-04 NOTE — TELEPHONE ENCOUNTER
Miesha not able to come to the phone.  Informed Adolph, on consent to communicate of Dr. Sosa's message.    He states an understanding and thanked for the call.

## 2021-06-04 NOTE — PATIENT INSTRUCTIONS - HE
Please follow up if you have any further issues.    You may contact me by phone or [employer] if you are worsening or if things are not improving.    Thank you for coming in today.

## 2021-06-04 NOTE — TELEPHONE ENCOUNTER
Please call patient's niece Sita HOPE -    ______________________________________________________________________     Home phone:  [phone] (home)     Cell phone:   Telephone Information:   Mobile [phone]       Other contacts:  Name Home Phone Work Phone Mobile Phone Relationship Lgl GrDONI Mulligan [phone] Nephew SITA Campbell [phone] Niece No     ______________________________________________________________________     Pinky's blood work is very stable and her heart and kidney tests are stable at this time.    I would recommend recheck in 6 months, sooner if issues.  Thank you,    Douglas Sosa MD  [facility]  1/3/2020, 9:58 PM     ______________________________________________________________________    Recent Results (from the past 240 hour(s))   BNP(B-type Natriuretic Peptide)   Result Value Ref Range    BNP 1,470 (H) 0 - 167 pg/mL   Basic Metabolic Panel   Result Value Ref Range    Sodium 143 136 - 145 mmol/L    Potassium 5.2 (H) 3.5 - 5.0 mmol/L    Chloride 106 98 - 107 mmol/L    CO2 27 22 - 31 mmol/L    Anion Gap, Calculation 10 5 - 18 mmol/L    Glucose 81 70 - 125 mg/dL    Calcium 9.2 8.5 - 10.5 mg/dL    BUN 31 (H) 8 - 28 mg/dL    Creatinine 1.21 (H) 0.60 - 1.10 mg/dL    GFR MDRD Af Amer 50 (L) >60 mL/min/1.73m2    GFR MDRD Non Af Amer 41 (L) >60 mL/min/1.73m2   HM2(CBC w/o Differential)   Result Value Ref Range    WBC 7.8 4.0 - 11.0 thou/uL    RBC 4.28 3.80 - 5.40 mill/uL    Hemoglobin 12.3 12.0 - 16.0 g/dL    Hematocrit 38.4 35.0 - 47.0 %    MCV 90 80 - 100 fL    MCH 28.7 27.0 - 34.0 pg    MCHC 32.0 32.0 - 36.0 g/dL    RDW 14.9 (H) 11.0 - 14.5 %    Platelets 250 140 - 440 thou/uL    MPV 8.2 7.0 - 10.0 fL       ______________________________________________________________________

## 2021-06-08 NOTE — TELEPHONE ENCOUNTER
Please call patient's niece SITA -    ______________________________________________________________________     Home phone:  [phone] (home)     Cell phone:   Telephone Information:   Mobile [phone]       Other contacts:  Name Home Phone Work Phone Mobile Phone Relationship Lgl DudleyDONI Mulligan [phone] SITA Lincoln [phone] [place] No     ______________________________________________________________________     We would like to set up [place] for a visit in July or August.    This can be per the patient preference-in person or virtual.    The patient will require follow-up blood work and this can be done before or after the visit.    Douglas Sosa MD  [facility]  6/4/2020, 12:41 PM

## 2021-06-10 NOTE — PATIENT INSTRUCTIONS - HE

## 2021-06-10 NOTE — PROGRESS NOTES
Wt Readings from Last 20 Encounters:   05/12/17 (!) 98 lb 12.8 oz (44.8 kg)   07/21/15 102 lb (46.3 kg)

## 2021-06-10 NOTE — PROGRESS NOTES
Wt Readings from Last 20 Encounters:   08/07/20 (!) 96 lb (43.5 kg)   01/03/20 101 lb (45.8 kg)   08/30/19 102 lb (46.3 kg)   07/16/19 104 lb 3.2 oz (47.3 kg)   07/16/19 104 lb 3.2 oz (47.3 kg)   07/15/19 104 lb 3.2 oz (47.3 kg)   07/09/19 101 lb (45.8 kg)   07/01/19 116 lb 11.2 oz (52.9 kg)   06/24/19 100 lb (45.4 kg)   05/08/19 (!) 98 lb (44.5 kg)   04/12/19 (!) 97 lb (44 kg)   04/02/19 (!) 93 lb 9.6 oz (42.5 kg)   03/22/19 (!) 95 lb 9.6 oz (43.4 kg)   03/18/19 (!) 96 lb 12.8 oz (43.9 kg)   03/16/19 (!) 98 lb (44.5 kg)   03/13/19 (!) 97 lb 14.4 oz (44.4 kg)   06/05/18 99 lb 8 oz (45.1 kg)   05/12/17 (!) 98 lb 12.8 oz (44.8 kg)   07/21/15 102 lb (46.3 kg)

## 2021-06-16 PROBLEM — R09.89 CARDIAC LEFT VENTRICULAR EJECTION FRACTION 10-20 PERCENT: Status: ACTIVE | Noted: 2019-06-24

## 2021-06-16 PROBLEM — K92.1 MELENA: Status: ACTIVE | Noted: 2019-03-08

## 2021-06-16 PROBLEM — D69.2 SENILE PURPURA (H): Status: ACTIVE | Noted: 2019-06-24

## 2021-06-16 PROBLEM — Z99.89 USES WALKER: Status: ACTIVE | Noted: 2020-01-05

## 2021-06-16 NOTE — TELEPHONE ENCOUNTER
Called and relayed message to Merlyn.    She took down the orders and will fax the results to PCP

## 2021-06-16 NOTE — TELEPHONE ENCOUNTER
Okay to proceed.  2 view of the lumbar spine if this is where she is having the pain.    Will likely need to be seen if not improving.    They can fax the results to me at [phone].    Douglas Sosa MD  General Internal Medicine  [facility]  4/1/2021, 8:59 AM

## 2021-06-16 NOTE — TELEPHONE ENCOUNTER
Merlyn at [facility] calling.  Pt reporting that she fell off bed to .     She is stating she has lower back pain.     Declined offer to schedule appt.      She is requesting orders for X Ray as they have portable X Ray available.

## 2021-06-19 NOTE — LETTER
Letter by Lora Hernandez MD at      Author: Lora eHrnandez MD Service: -- Author Type: --    Filed:  Encounter Date: 3/21/2019 Status: (Other)         Patient: Pinky Duggan   MR Number: [id number]   YOB: 1924   Date of Visit: 3/21/2019     [facility]      Facility:    [facility] [[id number]]  Code Status: DNR       Chief Complaint/Reason for Visit:  Chief Complaint   Patient presents with   ? H & P     Admisssion note to TCU after hospitalization for weakness, recent GIB, recent UTI with sepsis. Hx CHF, afib.       HPI:   Pinky is a 89 y.o. female who was admitted to the hospital on 3/14/19 with weakness, also had prior admission. Hospital discharge summary as per below.    89 y.o. old female with h/o HTN, Afib, melena and recent sepsis just discharged from [facility] presents with decreased appetite. Teresa declined the patient for TCU at discharge on 3/13 from [facility] following completion of the Tcvd-he-Pbbz Review.  The patient was discharged home with home care but per patient no one showed up at there home.  Patient reports she returned to the ED as due to her age of 89 and her multiple complex medical comorbidities after recent hospitalization for sepsis was unable to provide independent level of care at home and therefore was in an unsafe environment prompting her to re-presented to the emergency department as she is unable to provide adequate safe care independently at home.     Deconditioning and fatigue per [facility] note from recent hospitalization : patient would benefit from TCU in my opinion but her insurance has denied this. She is discharged to home with home care in extremely guarded prognosis.   As stated above patient is 89 with multiple complex, severe medical comorbidities, lives independently until recent acute decompensation for sepsis/illness resulting in current inability to provide adequate and, more importantly, safe  care at home.  Furthermore, clear documentation from prior inpatient PT/OT assessments documenting need for TCU care at this time.    Patient once again declined by her insurance company, [employer], for TCU coverage.  Patient now paying out of pocket for TCU which she is being discharged to today.     Recent Melena during her stay concern for upper GI bleed, possibly secondary to aspirin use.  No history of cirrhosis.    - Hgb stable   -- continue oral PPI therapy indefinately   -- GI seen previously endoscopy should be deferred indefinately  -- avoid ASA indefinitely  - no reports at this time     Atrial fibrillation rate controlled new diagnosis   -- continue metoprolol started last admission   -- defer anticoagulation indefinitely given above     Systolic CHF: TTE with severely reduced EF of 20%. Unclear etiology.  -- Cardiology seen previously started lasix and lisinopril, continue BB  -- CHF follow up as outpatient  - compensated at this time      Essential hypertension home amlodipine and HCTZ stopped at discharge   - Started lisinopril and lasix as above + Metoprolol   -Continue     Recent  Sepsis syndrome: Resolved. Found to be likely urinary source. Completed 5 days levaquin previously      Possible aspiration pneumonitis:  Per abnormal right lung opacities on recent CXR.  VSS negative and no signs or symptoms of aspiration per speech therapy evaluation.        Overall stabilized and discharged to TCU on 3/16/19 for PT, OT, nursing cares, medical management and monitoring.     Today:  She is feeling somewhat improved but overall still quite weak, would be unable to manage at home. Some dyspnea with exertion, no chest pain. No dizziness. Appetite is fair. No abdominal pain, diarrhea or constipation. No fever or cough. She states she is planning to move to [facility] after her TCU stay. She previously was in independent [facility] home. No new vision or hearing problems.       Past Medical History:  Past Medical History:    Diagnosis Date   ? DNR (do not resuscitate) / DNI    ? HTN (hypertension)    ? Nonsmoker    ? Osteoporosis     Completed 5 years of alendronate therapy.           Surgical History:  No past surgical history on file.    Family History:   Family History   Problem Relation Age of Onset   ? Stroke Mother    ? Leukemia Father        Social History:    Social History     Socioeconomic History   ? Marital status:      Spouse name: Not on file   ? Number of children: 0   ? Years of education: Not on file   ? Highest education level: Not on file   Occupational History   ? Occupation: Teacher,      Employer: RETIRED   Social Needs   ? Financial resource strain: Not on file   ? Food insecurity:     Worry: Not on file     Inability: Not on file   ? Transportation needs:     Medical: Not on file     Non-medical: Not on file   Tobacco Use   ? Smoking status: Never Smoker   ? Smokeless tobacco: Never Used   Substance and Sexual Activity   ? Alcohol use: No   ? Drug use: No   ? Sexual activity: Not on file   Lifestyle   ? Physical activity:     Days per week: Not on file     Minutes per session: Not on file   ? Stress: Not on file   Relationships   ? Social connections:     Talks on phone: Not on file     Gets together: Not on file     Attends Bahai service: Not on file     Active member of club or organization: Not on file     Attends meetings of clubs or organizations: Not on file     Relationship status: Not on file   ? Intimate partner violence:     Fear of current or ex partner: Not on file     Emotionally abused: Not on file     Physically abused: Not on file     Forced sexual activity: Not on file   Other Topics Concern   ? Not on file   Social History Narrative    Former patient of Dr. Damaris Billings.        No children.  Former .        POA for medical decisions is Nephaditya Villarreal.        Medications:  Current Outpatient Medications   Medication Sig   ? acetaminophen (TYLENOL) 325  MG tablet Take 2 tablets (650 mg total) by mouth every 6 (six) hours as needed.   ? furosemide (LASIX) 40 MG tablet Take 1 tablet (40 mg total) by mouth daily. (Patient taking differently: Take 40 mg by mouth daily.       )   ? lisinopril (PRINIVIL,ZESTRIL) 5 MG tablet Take 1 tablet (5 mg total) by mouth daily. (Patient taking differently: Take 5 mg by mouth daily.       )   ? magnesium chloride (SLOW-MAG) 64 mg TbEC delayed-release tablet Take 1 tablet (64 mg total) by mouth daily. (Patient taking differently: Take 64 mg by mouth daily.       )   ? metoprolol succinate (TOPROL-XL) 100 MG 24 hr tablet Take 1 tablet (100 mg total) by mouth daily. (Patient taking differently: Take 100 mg by mouth daily.       )   ? omeprazole (PRILOSEC) 20 MG capsule Take 1 capsule (20 mg total) by mouth 2 (two) times a day before meals. (Patient taking differently: Take 20 mg by mouth 2 (two) times a day before meals.       )   ? potassium chloride (K-DUR,KLOR-CON) 20 MEQ tablet Take 1 tablet (20 mEq total) by mouth daily. (Patient taking differently: Take 20 mEq by mouth daily.       )   ? tetrahydrozoline (VISINE) 0.05 % ophthalmic solution Apply 1 drop to eye at bedtime as needed.       Allergies:  Allergies   Allergen Reactions   ? Penicillins Swelling       Review of Systems:  Pertinent items are noted in HPI.      Physical Exam:   General: Patient is alert elderly pleasant female, no distress.    Vitals: /75, Temp 97.8, Pulse 76, RR 16, O2 sat 96% RA.  HEENT: Head is NCAT. Eyes show no injection or icterus. Nares negative. Oropharynx well hydrated.  Neck: Supple. No tenderness or adenopathy. No JVD.  Lungs: Clear bilaterally. No wheezes.  Cardiovascular: irregular with murmur noted..  Back: No spinal or CVA tenderness.  Abdomen: Soft, no tenderness on exam. Bowel sounds present. No guarding rebound or rigidity.  : Deferred.  Extremities: Puffy feet left more than right.  Musculoskeletal: Age related degen changes.    Skin: No rashes.   Psych: Mood appears good.      Labs:  Results for orders placed or performed during the hospital encounter of 03/14/19   Basic Metabolic Panel   Result Value Ref Range    Sodium 141 136 - 145 mmol/L    Potassium 4.0 3.5 - 5.0 mmol/L    Chloride 104 98 - 107 mmol/L    CO2 29 22 - 31 mmol/L    Anion Gap, Calculation 8 5 - 18 mmol/L    Glucose 88 70 - 125 mg/dL    Calcium 8.4 (L) 8.5 - 10.5 mg/dL    BUN 35 (H) 8 - 28 mg/dL    Creatinine 0.91 0.60 - 1.10 mg/dL    GFR MDRD Af Amer >60 >60 mL/min/1.73m2    GFR MDRD Non Af Amer 58 (L) >60 mL/min/1.73m2       Lab Results   Component Value Date    WBC 9.2 03/16/2019    HGB 9.9 (L) 03/16/2019    HCT 31.4 (L) 03/16/2019    MCV 96 03/16/2019     03/16/2019         Assessment/Plan:  1. Weakness. Here for therapy, strengthening.   2. HTN. On lisinopril, metoprolol. Monitor BPs.  3. CHF/CM. Cont medical management, diuretics. Monitor weights, edema, clinical status. Follow up with cardiology.   4. Afib. New diagnosis. On metoprolol. No anticoagulation secondary to recent GIB.  5. GIB. Recheck hgb at 9.9.  6. Recent UTI, sepsis.  No current concerns. Observe for sx.  7. Code status is DNR.        Total time greater than 45 minutes, greater than 50% counseling and coordination of care, time spent in interview and examination of patient, review of records, discussion with nursing staff. CC includes coordination with care team and  for disposition plans, likely moving to [facility] after TCU. Discussion with patient regarding new meds for afib, risks and benefits of anticoagulation given recent GIB. Importance of monitoring hgb to ensure stability.         Electronically signed by: Lora Hernandez MD

## 2021-06-19 NOTE — LETTER
Letter by Alisa Orellana CNP at      Author: Alisa Orellana CNP Service: -- Author Type: --    Filed:  Encounter Date: 7/5/2019 Status: (Other)         Patient: Pinky Duggan   MR Number: [id number]   YOB: 1924   Date of Visit: 7/5/2019     [facility]      Facility:    [facility] [[id number]]  Code Status: DNR      Chief Complaint/Reason for Visit:  Chief Complaint   Patient presents with   ? Follow Up       HPI:   Pinky is a 89 y.o. female who, I see today for initial TCU follow up.  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 .  She was recently hospitalized at [facility] 6/27 to 7/3/2019 for a mechanical fall with a hip fracture.  She had an ORIF with pins and nails.  Her hospitalization was complicated by prolonged QTc with LBBB.  She was found to have  hypomagnesemia which resolved with supplementation.     Today, she reports her pain is managed by tylenol however she feels she may wait too long to take it.  She has no edema and her incision is well approximated with staples.  She has no drainage, edema or erythema.  She has good CMS and denies s/s of DVT.  Her BPs are well managed with SBPs 120-130s.       Past Medical History:  Past Medical History:   Diagnosis Date   ? Abnormal ECG    ? Atrial fibrillation (H)    ? CHF (congestive heart failure) (H)    ? DNR (do not resuscitate) / DNI    ? HTN (hypertension)    ? Nonsmoker    ? Osteoporosis     Completed 5 years of alendronate therapy.           Surgical History:  Past Surgical History:   Procedure Laterality Date   ? APPENDECTOMY     ? NV PERCUT FIX PROX/NECK FEMUR FX Left 6/28/2019    Procedure: INTERNAL FIXATION, FRACTURE, HIP, WITH PINS OR NAILS;  Surgeon: Rohith Galeano MD;  [place] [facility] [place]  Service: Orthopedics       Family History:    Family History   Problem Relation Age of Onset   ? Stroke Mother    ? Leukemia Father        Social History:    Social History     Socioeconomic History   ? Marital status:      Spouse name: Not on file   ? Number of children: 0   ? Years of education: Not on file   ? Highest education level: Not on file   Occupational History   ? Occupation: Teacher,      Employer: RETIRED   Social Needs   ? Financial resource strain: Not on file   ? Food insecurity:     Worry: Not on file     Inability: Not on file   ? Transportation needs:     Medical: Not on file     Non-medical: Not on file   Tobacco Use   ? Smoking status: Never Smoker   ? Smokeless tobacco: Never Used   Substance and Sexual Activity   ? Alcohol use: Yes     Comment: not regularly   ? Drug use: No   ? Sexual activity: Not on file   Lifestyle   ? Physical activity:     Days per week: Not on file     Minutes per session: Not on file   ? Stress: Not on file   Relationships   ? Social connections:     Talks on phone: Not on file     Gets together: Not on file     Attends Mandaeism service: Not on file     Active member of club or organization: Not on file     Attends meetings of clubs or organizations: Not on file     Relationship status: Not on file   ? Intimate partner violence:     Fear of current or ex partner: Not on file     Emotionally abused: Not on file     Physically abused: Not on file     Forced sexual activity: Not on file   Other Topics Concern   ? Not on file   Social History Narrative    Former patient of Dr. Damaris Billings.        No children.  Former .        POA for medical decisions is Nephaditya Villarreal.        Contact Alida Villarreal for results or issues - may leave message.    (Cell) - [phone]    Home phone - [phone]        Moved to [facility] in 2019.  Walks with a walker.  Remains active.          Review of Systems   Patient denies fever, chills, headache, lightheadedness, dizziness,  rhinorrhea, cough, congestion, shortness of breath, chest pain, palpitations, abdominal pain, n/v, diarrhea, constipation, change in appetite, dysuria, frequency, burning or pain with urination.  Other than stated in HPI all other review of systems is negative.           Physical Exam   Vital signs: /56, HR 72, resp 20, temp 97.6  GENERAL APPEARANCE: Thin, elderly woman in no acute distress.   HEENT: normocephalic, atraumatic  PERRL, sclerae anicteric, conjunctivae clear and moist, EOM intact  Mucous membranes are moist  NECK: Supple and symmetric. Trachea is midline, no thyromegaly, no adenopathy, and no tenderness  LUNGS: Lung sounds CTA, no adventitious sounds, respiratory effort normal.  CARD: RRR, S1, S2, without murmurs, gallops, rubs,   ABD: Soft and nontender with normal bowel sounds.   MSK: Muscle strength and tone were normal.  EXTREMITIES: No cyanosis, clubbing or edema.good cms without s/s of DVT.   NEURO: Alert and oriented x 3. Face is symmetric.  SKIN: left hip incision is well approximated with staples without drainage, erythema or edema.  PSYCH: euthymic          Medication List:  Current Outpatient Medications   Medication Sig   ? aspirin 325 MG EC tablet Take 1 tablet (325 mg total) by mouth daily.   ? furosemide (LASIX) 40 MG tablet Take 1 tablet (40 mg total) by mouth daily.   ? lisinopril (PRINIVIL,ZESTRIL) 5 MG tablet Take 1 tablet (5 mg total) by mouth every evening.   ? metoprolol succinate (TOPROL-XL) 25 MG Take 3 tablets (75 mg total) by mouth daily.   ? omeprazole (PRILOSEC) 20 MG capsule Take 1 capsule (20 mg total) by mouth daily before breakfast.       Labs:  Recent Results (from the past 240 hour(s))   ECG 12 lead nursing unit performed   Result Value Ref Range    SYSTOLIC BLOOD PRESSURE  mmHg    DIASTOLIC BLOOD PRESSURE  mmHg    VENTRICULAR RATE 87 BPM    ATRIAL RATE 87 BPM    P-R INTERVAL 184 ms    QRS DURATION 154 ms    Q-T INTERVAL 420 ms    QTC CALCULATION (BEZET) 505 ms     P Axis 92 degrees    R AXIS 28 degrees    T AXIS 139 degrees    MUSE DIAGNOSIS       Sinus rhythm with occasional Premature ventricular complexes  Left bundle branch block  Abnormal ECG  When compared with ECG of 12-MAR-2019 13:47,  Premature ventricular complexes are now Present  Left bundle branch block is now Present  Confirmed by JANESSA MALONEY MD LOC:[facility] ([id number]) on 6/28/2019 10:39:53 AM     Basic Metabolic Panel   Result Value Ref Range    Sodium 140 136 - 145 mmol/L    Potassium 4.4 3.5 - 5.0 mmol/L    Chloride 104 98 - 107 mmol/L    CO2 25 22 - 31 mmol/L    Anion Gap, Calculation 11 5 - 18 mmol/L    Glucose 91 70 - 125 mg/dL    Calcium 9.6 8.5 - 10.5 mg/dL    BUN 31 (H) 8 - 28 mg/dL    Creatinine 1.13 (H) 0.60 - 1.10 mg/dL    GFR MDRD Af Amer 54 (L) >60 mL/min/1.73m2    GFR MDRD Non Af Amer 45 (L) >60 mL/min/1.73m2   INR   Result Value Ref Range    INR 1.06 0.90 - 1.10   BNP(B-type Natriuretic Peptide)   Result Value Ref Range    BNP 1,668 (H) 0 - 167 pg/mL   HM1 (CBC with Diff)   Result Value Ref Range    WBC 13.6 (H) 4.0 - 11.0 thou/uL    RBC 4.04 3.80 - 5.40 mill/uL    Hemoglobin 11.6 (L) 12.0 - 16.0 g/dL    Hematocrit 36.4 35.0 - 47.0 %    MCV 90 80 - 100 fL    MCH 28.7 27.0 - 34.0 pg    MCHC 31.9 (L) 32.0 - 36.0 g/dL    RDW 13.9 11.0 - 14.5 %    Platelets 236 140 - 440 thou/uL    MPV 10.5 8.5 - 12.5 fL    Neutrophils % 81 (H) 50 - 70 %    Lymphocytes % 10 (L) 20 - 40 %    Monocytes % 9 2 - 10 %    Eosinophils % 1 0 - 6 %    Basophils % 0 0 - 2 %    Neutrophils Absolute 10.9 (H) 2.0 - 7.7 thou/uL    Lymphocytes Absolute 1.3 0.8 - 4.4 thou/uL    Monocytes Absolute 1.2 (H) 0.0 - 0.9 thou/uL    Eosinophils Absolute 0.1 0.0 - 0.4 thou/uL    Basophils Absolute 0.1 0.0 - 0.2 thou/uL   APTT  (if on Coumadin)   Result Value Ref Range    PTT 33 24 - 37 seconds   Magnesium   Result Value Ref Range    Magnesium 1.9 1.8 - 2.6 mg/dL   Type and Screen   Result Value Ref Range    ABORh O POS     Antibody Screen  Negative Negative   Basic metabolic panel (If not done in ED)   Result Value Ref Range    Sodium 140 136 - 145 mmol/L    Potassium 3.9 3.5 - 5.0 mmol/L    Chloride 105 98 - 107 mmol/L    CO2 29 22 - 31 mmol/L    Anion Gap, Calculation 6 5 - 18 mmol/L    Glucose 99 70 - 125 mg/dL    Calcium 9.3 8.5 - 10.5 mg/dL    BUN 27 8 - 28 mg/dL    Creatinine 0.85 0.60 - 1.10 mg/dL    GFR MDRD Af Amer >60 >60 mL/min/1.73m2    GFR MDRD Non Af Amer >60 >60 mL/min/1.73m2   Hemoglobin   Result Value Ref Range    Hemoglobin 11.4 (L) 12.0 - 16.0 g/dL   Protime-INR (if on Coumadin)   Result Value Ref Range    INR 1.12 (H) 0.90 - 1.10   APTT  (if on Coumadin)   Result Value Ref Range    PTT 32 24 - 37 seconds   Magnesium   Result Value Ref Range    Magnesium 1.8 1.8 - 2.6 mg/dL   Protime-INR (if on Coumadin)   Result Value Ref Range    INR 1.14 (H) 0.90 - 1.10   APTT  (if on Coumadin)   Result Value Ref Range    PTT 38 (H) 24 - 37 seconds   Magnesium   Result Value Ref Range    Magnesium 1.5 (L) 1.8 - 2.6 mg/dL   HGB   Result Value Ref Range    Hemoglobin 10.7 (L) 12.0 - 16.0 g/dL   Basic Metabolic Panel   Result Value Ref Range    Sodium 140 136 - 145 mmol/L    Potassium 3.8 3.5 - 5.0 mmol/L    Chloride 104 98 - 107 mmol/L    CO2 30 22 - 31 mmol/L    Anion Gap, Calculation 6 5 - 18 mmol/L    Glucose 74 70 - 125 mg/dL    Calcium 8.7 8.5 - 10.5 mg/dL    BUN 21 8 - 28 mg/dL    Creatinine 0.85 0.60 - 1.10 mg/dL    GFR MDRD Af Amer >60 >60 mL/min/1.73m2    GFR MDRD Non Af Amer >60 >60 mL/min/1.73m2   HM2(CBC w/o Differential)   Result Value Ref Range    WBC 9.5 4.0 - 11.0 thou/uL    RBC 3.85 3.80 - 5.40 mill/uL    Hemoglobin 10.9 (L) 12.0 - 16.0 g/dL    Hematocrit 35.6 35.0 - 47.0 %    MCV 93 80 - 100 fL    MCH 28.3 27.0 - 34.0 pg    MCHC 30.6 (L) 32.0 - 36.0 g/dL    RDW 14.2 11.0 - 14.5 %    Platelets 185 140 - 440 thou/uL    MPV 10.8 8.5 - 12.5 fL   Comprehensive Metabolic Panel   Result Value Ref Range    Sodium 137 136 - 145 mmol/L     Potassium 4.0 3.5 - 5.0 mmol/L    Chloride 102 98 - 107 mmol/L    CO2 27 22 - 31 mmol/L    Anion Gap, Calculation 8 5 - 18 mmol/L    Glucose 77 70 - 125 mg/dL    BUN 23 8 - 28 mg/dL    Creatinine 0.92 0.60 - 1.10 mg/dL    GFR MDRD Af Amer >60 >60 mL/min/1.73m2    GFR MDRD Non Af Amer 57 (L) >60 mL/min/1.73m2    Bilirubin, Total 0.3 0.0 - 1.0 mg/dL    Calcium 8.7 8.5 - 10.5 mg/dL    Protein, Total 6.0 6.0 - 8.0 g/dL    Albumin 2.8 (L) 3.5 - 5.0 g/dL    Alkaline Phosphatase 54 45 - 120 U/L    AST 14 0 - 40 U/L    ALT <9 0 - 45 U/L   Magnesium   Result Value Ref Range    Magnesium 1.9 1.8 - 2.6 mg/dL   Magnesium   Result Value Ref Range    Magnesium 2.1 1.8 - 2.6 mg/dL   HM2(CBC w/o Differential)   Result Value Ref Range    WBC 8.2 4.0 - 11.0 thou/uL    RBC 3.95 3.80 - 5.40 mill/uL    Hemoglobin 11.2 (L) 12.0 - 16.0 g/dL    Hematocrit 36.4 35.0 - 47.0 %    MCV 92 80 - 100 fL    MCH 28.4 27.0 - 34.0 pg    MCHC 30.8 (L) 32.0 - 36.0 g/dL    RDW 14.4 11.0 - 14.5 %    Platelets 215 140 - 440 thou/uL    MPV 10.6 8.5 - 12.5 fL   Basic Metabolic Panel   Result Value Ref Range    Sodium 139 136 - 145 mmol/L    Potassium 3.9 3.5 - 5.0 mmol/L    Chloride 105 98 - 107 mmol/L    CO2 28 22 - 31 mmol/L    Anion Gap, Calculation 6 5 - 18 mmol/L    Glucose 81 70 - 125 mg/dL    Calcium 8.8 8.5 - 10.5 mg/dL    BUN 22 8 - 28 mg/dL    Creatinine 0.79 0.60 - 1.10 mg/dL    GFR MDRD Af Amer >60 >60 mL/min/1.73m2    GFR MDRD Non Af Amer >60 >60 mL/min/1.73m2         Assessment:    ICD-10-CM    1. S/P ORIF (open reduction internal fixation) fracture Z96.7     Z87.81    2. S/p left hip fracture Z87.81    3. Essential hypertension I10    4. Cardiomyopathy, unspecified type (H) I42.9    5. Paroxysmal atrial fibrillation (H) I48.0    6. CKD (chronic kidney disease) stage 2, GFR 60-89 ml/min N18.2        Plan:  At this time, her hip ORIF is stable.  Continue with therapies.  Schedule tylenol 1000mg three times a day.Counseled patient on pain  management and to continue with good mobility to improve function and pain.  F/u with ortho.  If F/u is after 7/13 consider having nursing remove staples.     HTN: stable continue with metoprolol ER 75mg, lisinopril 5mg     Cardiomyopathy/CHF: continue with BP control and lasix 40mg daily.   ASA 81mg for prevention    Afib: metoprolol ER 75mg, on ASA, no anticoagulation due to fall.     CKD: will need to check BMP next week.     35 minutes total time with 30 minutes spent with patient in coordination and counseling of the above plan of care.       Electronically signed by: Alisa Orellana, CNP

## 2021-06-19 NOTE — LETTER
Letter by Douglas Sosa MD at      Author: Douglas Sosa MD Service: -- Author Type: --    Filed:  Encounter Date: 6/24/2019 Status: (Other)         6/24/2019    Pinky Duggan   [address] [place] [id number]         Dear Pinky,    It was good to see you in clinic.  I hope your questions were answered at the time of your visit.    The results of your tests from your visit were as follows:    Resulted Orders   HM2(CBC w/o Differential)   Result Value Ref Range    WBC 6.3 4.0 - 11.0 thou/uL    RBC 4.37 3.80 - 5.40 mill/uL    Hemoglobin 12.4 12.0 - 16.0 g/dL    Hematocrit 38.4 35.0 - 47.0 %    MCV 88 80 - 100 fL    MCH 28.5 27.0 - 34.0 pg    MCHC 32.4 32.0 - 36.0 g/dL    RDW 11.7 11.0 - 14.5 %    Platelets 266 140 - 440 thou/uL    MPV 8.2 7.0 - 10.0 fL   Basic Metabolic Panel   Result Value Ref Range    Sodium 142 136 - 145 mmol/L    Potassium 5.3 (H) 3.5 - 5.0 mmol/L    Chloride 104 98 - 107 mmol/L    CO2 27 22 - 31 mmol/L    Anion Gap, Calculation 11 5 - 18 mmol/L    Glucose 71 70 - 125 mg/dL    Calcium 9.9 8.5 - 10.5 mg/dL    BUN 26 8 - 28 mg/dL    Creatinine 1.05 0.60 - 1.10 mg/dL    GFR MDRD Af Amer 59 (L) >60 mL/min/1.73m2    GFR MDRD Non Af Amer 49 (L) >60 mL/min/1.73m2    Narrative    Fasting Glucose reference range is 70-99 mg/dL per  American Diabetes Association (ADA) guidelines.   BNP(B-type Natriuretic Peptide)   Result Value Ref Range    BNP 1,339 (H) 0 - 167 pg/mL     Your blood counts are normal and you are not anemic.     Your kidney tests are normal.  Your electrolytes are also normal.  There is no signs of diabetes.     Your potassium is doing very well and you can stop the potassium supplement.    Your heart failure test, while elevated, is doing a lot better.    Please follow up with me again on 10/22/2019 as you have already scheduled.     If you have any questions regarding these results, please feel free to contact me at [phone].  I wish you the best of  health!        Sincerely,      Douglas Sosa MD  General Internal Medicine  [facility]

## 2021-06-19 NOTE — LETTER
Letter by Lora Hernandez MD at      Author: Lora Hernandez MD Service: -- Author Type: --    Filed:  Encounter Date: 3/26/2019 Status: (Other)         Patient: Pinky Duggan   MR Number: [id number]   YOB: 1924   Date of Visit: 3/26/2019     [facility]    Facility:   [facility] [[id number]]   Code Status: DNR       Chief Complaint   Patient presents with   ? Follow Up     TCU 3/26/19.       HPI:   Pinky is a 89 y.o. female who was admitted to the hospital on 3/14/19 with weakness, also had prior admission. Hospital discharge summary as per below.    89 y.o. old female with h/o HTN, Afib, melena and recent sepsis just discharged from [facility] presents with decreased appetite. Teresa declined the patient for TCU at discharge on 3/13 from [facility] following completion of the [id number] Review.  The patient was discharged home with home care but per patient no one showed up at there home.  Patient reports she returned to the ED as due to her age of 89 and her multiple complex medical comorbidities after recent hospitalization for sepsis was unable to provide independent level of care at home and therefore was in an unsafe environment prompting her to re-presented to the emergency department as she is unable to provide adequate safe care independently at home.     Deconditioning and fatigue per HMS note from recent hospitalization : patient would benefit from TCU in my opinion but her insurance has denied this. She is discharged to home with home care in extremely guarded prognosis.   As stated above patient is 89 with multiple complex, severe medical comorbidities, lives independently until recent acute decompensation for sepsis/illness resulting in current inability to provide adequate and, more importantly, safe care at home.  Furthermore, clear documentation from prior inpatient PT/OT assessments documenting need for TCU care at this  time.    Patient once again declined by her insurance company, [employer], for TCU coverage.  Patient now paying out of pocket for TCU which she is being discharged to today.     Recent Melena during her stay concern for upper GI bleed, possibly secondary to aspirin use.  No history of cirrhosis.    - Hgb stable   -- continue oral PPI therapy indefinately   -- GI seen previously endoscopy should be deferred indefinately  -- avoid ASA indefinitely  - no reports at this time     Atrial fibrillation rate controlled new diagnosis   -- continue metoprolol started last admission   -- defer anticoagulation indefinitely given above     Systolic CHF: TTE with severely reduced EF of 20%. Unclear etiology.  -- Cardiology seen previously started lasix and lisinopril, continue BB  -- CHF follow up as outpatient  - compensated at this time      Essential hypertension home amlodipine and HCTZ stopped at discharge   - Started lisinopril and lasix as above + Metoprolol   -Continue     Recent  Sepsis syndrome: Resolved. Found to be likely urinary source. Completed 5 days levaquin previously      Possible aspiration pneumonitis:  Per abnormal right lung opacities on recent CXR.  VSS negative and no signs or symptoms of aspiration per speech therapy evaluation.        Overall stabilized and discharged to TCU on 3/16/19 for PT, OT, nursing cares, medical management and monitoring.     Today:  She is tired today but overall has noticed improvement. She had a busy day with therapy and coordinating her move to [facility] after her TCU stay. No chest pain. No dizziness. Appetite is fair. No abdominal pain, diarrhea or constipation. No evidence of bleeding in stools. No fever or cough. BPs satisfactory.       Past Medical History:  Past Medical History:   Diagnosis Date   ? DNR (do not resuscitate) / DNI    ? HTN (hypertension)    ? Nonsmoker    ? Osteoporosis     Completed 5 years of alendronate therapy.        Medications:  Current Outpatient  Medications   Medication Sig   ? acetaminophen (TYLENOL) 325 MG tablet Take 2 tablets (650 mg total) by mouth every 6 (six) hours as needed.   ? furosemide (LASIX) 40 MG tablet Take 1 tablet (40 mg total) by mouth daily. (Patient taking differently: Take 40 mg by mouth daily.       )   ? lisinopril (PRINIVIL,ZESTRIL) 5 MG tablet Take 1 tablet (5 mg total) by mouth daily. (Patient taking differently: Take 5 mg by mouth daily.       )   ? magnesium chloride (SLOW-MAG) 64 mg TbEC delayed-release tablet Take 1 tablet (64 mg total) by mouth daily. (Patient taking differently: Take 64 mg by mouth daily.       )   ? metoprolol succinate (TOPROL-XL) 100 MG 24 hr tablet Take 1 tablet (100 mg total) by mouth daily. (Patient taking differently: Take 100 mg by mouth daily.       )   ? omeprazole (PRILOSEC) 20 MG capsule Take 1 capsule (20 mg total) by mouth 2 (two) times a day before meals. (Patient taking differently: Take 20 mg by mouth 2 (two) times a day before meals.       )   ? potassium chloride (K-DUR,KLOR-CON) 20 MEQ tablet Take 1 tablet (20 mEq total) by mouth daily. (Patient taking differently: Take 20 mEq by mouth daily.       )   ? tetrahydrozoline (VISINE) 0.05 % ophthalmic solution Apply 1 drop to eye at bedtime as needed.       Physical Exam:   General: Patient is alert elderly pleasant female, no distress.    Vitals: /53, Temp 96.9, Pulse 73, RR 18, O2 sat 95% RA.  HEENT: Head is NCAT. Eyes show no injection or icterus. Nares negative. Oropharynx well hydrated.  Neck: Supple. No tenderness or adenopathy. No JVD.  Lungs: Clear bilaterally. No wheezes.  Cardiovascular: irregular with murmur noted.  Back: No spinal or CVA tenderness.  Abdomen: Soft, no tenderness on exam. Bowel sounds present. No guarding rebound or rigidity.  Extremities: Puffy feet left more than right.  Musculoskeletal: Age related degen changes.   Skin: No rashes.   Psych: Mood appears good.      Labs:  Results for orders placed or  performed during the hospital encounter of 03/14/19   Basic Metabolic Panel   Result Value Ref Range    Sodium 141 136 - 145 mmol/L    Potassium 4.0 3.5 - 5.0 mmol/L    Chloride 104 98 - 107 mmol/L    CO2 29 22 - 31 mmol/L    Anion Gap, Calculation 8 5 - 18 mmol/L    Glucose 88 70 - 125 mg/dL    Calcium 8.4 (L) 8.5 - 10.5 mg/dL    BUN 35 (H) 8 - 28 mg/dL    Creatinine 0.91 0.60 - 1.10 mg/dL    GFR MDRD Af Amer >60 >60 mL/min/1.73m2    GFR MDRD Non Af Amer 58 (L) >60 mL/min/1.73m2       Lab Results   Component Value Date    WBC 9.2 03/16/2019    HGB 9.9 (L) 03/16/2019    HCT 31.4 (L) 03/16/2019    MCV 96 03/16/2019     03/16/2019         Assessment/Plan:  1. HTN. On lisinopril, metoprolol. Monitor BPs.  2. CHF/CM. Cont medical management, diuretics. Monitor weights, edema, clinical status. Follow up with cardiology.   3. Afib. New on metoprolol. No anticoagulation secondary to recent GIB. Adequate rate control.  4. GIB. Recheck hgb at 9.9. Stable.   5. Weakness. Working with therapy. Note she will be moving to [place] after her TCU stays due to need for more assistance at home than she used to require.        Electronically signed by: Lora Hernandez MD

## 2021-06-19 NOTE — LETTER
Letter by Mili Hylton CNP at      Author: Mili Hylton CNP Service: -- Author Type: --    Filed:  Encounter Date: 7/15/2019 Status: (Other)         Patient: Pinky Duggan   MR Number: [id number]   YOB: 1924   Date of Visit: 7/15/2019     [facility]    Facility:   [facility] [[id number]]   Code Status: DNR      CHIEF COMPLAINT/REASON FOR VISIT:  Chief Complaint   Patient presents with   ? Review Of Multiple Medical Conditions       HISTORY:      HPI: Pinky is a 89 y.o. female undergoing physical and occupational therapy at [facility]. She is with history of osteoporosis and recently diagnosed nonischemic cardiomyopathy with ejection fraction of 20% as well as mitral valve disease admitted to the hospital with acute left femoral neck fracture from a fall. She underwent closed reduction and percutaneous screw fixation of L femoral neck fracture    Today she is seen for a routine  visit to review multiple medical issues. She denied CP or shortness of breath. She had a medium formed stool today and denied urinary issues. She had no cough or congestion. Left hip incision is with staples C/D/I. Her weights are stable and she reports pain controlled. She is with severe scoliosis.     Past Medical History:   Diagnosis Date   ? Abnormal ECG    ? Atrial fibrillation (H)    ? CHF (congestive heart failure) (H)    ? DNR (do not resuscitate) / DNI    ? HTN (hypertension)    ? Nonsmoker    ? Osteoporosis     Completed 5 years of alendronate therapy.             Family History   Problem Relation Age of Onset   ? Stroke Mother    ? Leukemia Father      Social History     Socioeconomic History   ? Marital status:      Spouse name: Not on file   ? Number of children: 0   ? Years of education: Not on file   ? Highest education level: Not on file   Occupational History   ? Occupation: Teacher,      Employer: RETIRED   Social  Needs   ? Financial resource strain: Not on file   ? Food insecurity:     Worry: Not on file     Inability: Not on file   ? Transportation needs:     Medical: Not on file     Non-medical: Not on file   Tobacco Use   ? Smoking status: Never Smoker   ? Smokeless tobacco: Never Used   Substance and Sexual Activity   ? Alcohol use: Yes     Comment: not regularly   ? Drug use: No   ? Sexual activity: Not on file   Lifestyle   ? Physical activity:     Days per week: Not on file     Minutes per session: Not on file   ? Stress: Not on file   Relationships   ? Social connections:     Talks on phone: Not on file     Gets together: Not on file     Attends Zoroastrianism service: Not on file     Active member of club or organization: Not on file     Attends meetings of clubs or organizations: Not on file     Relationship status: Not on file   ? Intimate partner violence:     Fear of current or ex partner: Not on file     Emotionally abused: Not on file     Physically abused: Not on file     Forced sexual activity: Not on file   Other Topics Concern   ? Not on file   Social History Narrative    Former patient of Dr. Damaris Billings.        No children.  Former .        POA for medical decisions is Araceli Villarreal.        Contact Alida Villarreal for results or issues - may leave message.    (Cell) - [phone]    Home phone - [phone]        Moved to [facility] in 2019.  Walks with a walker.  Remains active.         Review of Systems   Constitutional: Positive for fatigue. Negative for activity change, appetite change and fever.   HENT: Negative for congestion.    Respiratory: Negative for cough, shortness of breath and wheezing.    Cardiovascular: Negative for chest pain and leg swelling.   Gastrointestinal: Negative for abdominal distention, abdominal pain, constipation, diarrhea and nausea.   Genitourinary: Negative for dysuria.   Musculoskeletal: Positive for arthralgias. Negative for back pain.   Skin:  Positive for wound. Negative for color change.   Neurological: Negative for dizziness.   Psychiatric/Behavioral: Negative for agitation, behavioral problems and confusion.       .  Vitals:    07/16/19 1916   BP: 131/54   Pulse: 70   Resp: 16   Temp: 97.2  F (36.2  C)   SpO2: 97%   Weight: 104 lb 3.2 oz (47.3 kg)       Physical Exam   Constitutional: She is oriented to person, place, and time. She appears well-developed and well-nourished.   Pleasant woman in no acute distress   HENT:   Head: Normocephalic and atraumatic.   Eyes: Pupils are equal, round, and reactive to light. Conjunctivae are normal.   Neck: Normal range of motion. Neck supple.   Cardiovascular: Normal rate, regular rhythm and normal heart sounds.   No murmur heard.  Pulmonary/Chest: Effort normal and breath sounds normal. She has no wheezes. She has no rales.   Abdominal: Soft. Bowel sounds are normal. She exhibits no distension. There is no tenderness.   Musculoskeletal: Normal range of motion. She exhibits no edema.   Neurological: She is alert and oriented to person, place, and time.   Skin: Skin is warm and dry.   Left hip incision with staples C/D/I  Fading bruise left cheek.    Psychiatric: She has a normal mood and affect. Her behavior is normal.         LABS:   No results found for this or any previous visit (from the past 240 hour(s)).    ASSESSMENT:      ICD-10-CM    1. Left displaced femoral neck fracture (H) S72.002A    2. Paroxysmal atrial fibrillation (H) I48.0    3. Essential hypertension I10    4. Debility R53.81        PLAN:    S/P  ORIF left  Hip- PT/OT, pain control, monitor incision  for S/S infection  Hypertension - on metoprolol, lisinopril, lasix  Atrial fibrillation continue metoprolol, ASA  Pain  Control- on Tylenol  Anticoagulation therapy on 325 mg ASA daily   GERD on omeprazole     Electronically signed by: Mili Hylton CNP

## 2021-06-19 NOTE — LETTER
Letter by Mili Hylton CNP at      Author: Mili Hylton CNP Service: -- Author Type: --    Filed:  Encounter Date: 7/16/2019 Status: (Other)         Patient: Pinky Duggan   MR Number: [id number]   YOB: 1924   Date of Visit: 7/16/2019     [facility]    Facility:   [facility] [[id number]]   Code Status: DNR  PCP: Douglas Sosa MD   Phone: [phone]   Fax: [phone]      CHIEF COMPLAINT/REASON FOR VISIT:  Chief Complaint   Patient presents with   ? Discharge Summary       HISTORY COURSE:  Pinky is a 89 y.o. female undergoing physical and occupational therapy at [facility]. She is with history of osteoporosis and recently diagnosed nonischemic cardiomyopathy with ejection fraction of 20% as well as mitral valve disease admitted to the hospital with acute left femoral neck fracture from a fall. She underwent closed reduction and percutaneous screw fixation of L femoral neck fracture     Today she is seen for a face to face for discharge She will discharge to [place] on 7/18 with current medications and treatments. She will have [facility] home care services. PT/OT/HHA and RN.  She denied CP or shortness of breath. She is moving her bowels and denied urinary issues. She had no cough or congestion. Left hip incision is with staples C/D/I. Her weights are stable and she reports pain controlled.  SHe will follow up with Orthopedics on Thursday 7/18.    Review of Systems  Constitutional: Positive for fatigue. Negative for activity change, appetite change and fever.   HENT: Negative for congestion.    Respiratory: Negative for cough, shortness of breath and wheezing.    Cardiovascular: Negative for chest pain and leg swelling.   Gastrointestinal: Negative for abdominal distention, abdominal pain, constipation, diarrhea and nausea.   Genitourinary: Negative for dysuria.   Musculoskeletal: Positive for arthralgias. Negative for  back pain.   Skin: Positive for wound. Negative for color change.   Neurological: Negative for dizziness.   Psychiatric/Behavioral: Negative for agitation, behavioral problems and confusion.   Vitals:    07/16/19 0922   BP: 124/62   Pulse: 69   Resp: 18   Temp: 97.7  F (36.5  C)   SpO2: 94%   Weight: 104 lb 3.2 oz (47.3 kg)       Physical Exam  Constitutional: She is oriented to person, place, and time. She appears well-developed and well-nourished.   Pleasant woman in no acute distress   HENT:   Head: Normocephalic and atraumatic.   Eyes: Pupils are equal, round, and reactive to light. Conjunctivae are normal.   Neck: Normal range of motion. Neck supple.   Cardiovascular: Normal rate, regular rhythm and normal heart sounds.   No murmur heard.  Pulmonary/Chest: Effort normal and breath sounds normal. She has no wheezes. She has no rales.   Abdominal: Soft. Bowel sounds are normal. She exhibits no distension. There is no tenderness.   Musculoskeletal: Normal range of motion. She exhibits no edema.   Neurological: She is alert and oriented to person, place, and time.   Skin: Skin is warm and dry.   Left hip incision with staples C/D/I  Fading bruise left cheek.    Psychiatric: She has a normal mood and affect. Her behavior is normal.   MEDICATION LIST:  Current Outpatient Medications   Medication Sig   ? aspirin 325 MG EC tablet Take 1 tablet (325 mg total) by mouth daily.   ? furosemide (LASIX) 40 MG tablet Take 1 tablet (40 mg total) by mouth daily.   ? lisinopril (PRINIVIL,ZESTRIL) 5 MG tablet Take 1 tablet (5 mg total) by mouth every evening.   ? metoprolol succinate (TOPROL-XL) 25 MG Take 3 tablets (75 mg total) by mouth daily.   ? omeprazole (PRILOSEC) 20 MG capsule Take 1 capsule (20 mg total) by mouth daily before breakfast.       DISCHARGE DIAGNOSIS:    ICD-10-CM    1. Left displaced femoral neck fracture (H) S72.002A    2. Paroxysmal atrial fibrillation (H) I48.0    3. Essential hypertension I10    4. Pain  management R52        MEDICAL EQUIPMENT NEEDS:  None     DISCHARGE PLAN/FACE TO FACE:  I certify that services are/were furnished while this patient was under the care of a physician and that a physician or an allowed non-physician practitioner (NPP), had a face-to-face encounter that meets the physician face-to-face encounter requirements. The encounter was in whole, or in part, related to the primary reason for home health. The patient is confined to his/her home and needs intermittent skilled nursing, physical therapy, speech-language pathology, or the continued need for occupational therapy. A plan of care has been established by a physician and is periodically reviewed by a physician.  Date of Face-to-Face Encounter: 7/16/19    I certify that, based on my findings, the following services are medically necessary home health services: [facility] PT/OT/HHA and RN     My clinical findings support the need for the above skilled services because: PT/OT for continued strength and  endurance following a left hip nailing , HHA to assist with ADL's and RN for VS, Medication management dn left hip wound check      This patient is homebound because: deconditioned and easily fatigued following a left hip nailing. She also requires the use of adaptive equipment     The patient is, or has been, under my care and I have initiated the establishment of the plan of care. This patient will be followed by a physician who will periodically review the plan of care.    Schedule follow up visit with primary care provider within 7 days to reestablish care.    Electronically signed by: Mili Hylton CNP     Electronically signed by: Lora Hernandez MD

## 2021-06-19 NOTE — LETTER
Letter by Lora Hernandez MD at      Author: Lora Hernandez MD Service: -- Author Type: --    Filed:  Encounter Date: 7/9/2019 Status: (Other)         Patient: Pinky Duggan   MR Number: [id number]   YOB: 1924   Date of Visit: 7/9/2019     [facility]      Facility:    [facility] [[id number]]  Code Status: DNR       Chief Complaint/Reason for Visit:  Chief Complaint   Patient presents with   ? H & P     Admit note to TCU after left hip fracture surgery.        HPI:   Pinky is a 89 y.o. female who was admitted to the hospital on 6/27/19 after a fall in which she sustained a left hip fracture. Her hospital info is partially excerpted below.     HOSPITAL HPI  Pinky Duggan is a 89 y.o. 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  Patient's primary historian, her nephew and his wife are in the room as well and corroborate history.  She was in her apartment.  She was using a walker although states she does not use it all the time.  She somehow twisted her foot when getting up to go to the bathroom and fell forward.  She did not lose consciousness.  She remembers all of the events.  She was able to get back up and felt some twinge of something involving her left hip.  However she walked rate down to lunch using her walker.  She walked around some more.  She was mostly worried about a bruise on her left cheek.  She really does not endorse any left hip pain but knew that she might have injured something so asked to be brought in for further evaluation.  Emergency room evaluation found a left hip fracture and she is being admitted.     She has never had general anesthesia that she can remember.  She remains fairly active in assisted living.  She did not know she had any heart issues until she was  hospitalized with sepsis earlier this year.  She was found to have an ejection fraction of 20% with mild to moderate mitral valve disease.  She is not interested in aggressive management.  She however is very active.    HOSPITAL DC SUMMARY  89-year-old female with history of osteoporosis and recently diagnosed nonischemic cardiomyopathy with ejection fraction of 20% as well as mitral valve disease admitted to the hospital with acute left femoral neck fracture from a fall.     Left femoral neck fracture  -Postoperative day #5 closed reduction and percutaneous screw fixation of L femoral neck fracture  -Postop Ortho recommending WBAT w walker, gait training w PT, ASA 325mg daily for VTE prevention and TCU; clinic visit 2-3 weeks post surgery.  -Continue current pain regimen as prescribed     History of A fib:   -Developed atrial fibrillation while hospitalized for UTI and then spontaneously converted during that hospitalization.  She also had apparent GI bleed during that time as well which is the reason anticoagulation was not initiated.  Per cardiology note 5/2019 patient has had no recurrences.  Patient declined prior left atrial appendage occlusion device as she wished to forego any aggressive measures.  -Currently on aspirin for DVT prophylaxis.  Continue Toprol-XL reduced dose of 75 mg daily.  No indication for telemetry monitoring at this time.     Chronic systolic heart failure, nonischemic:   -Unable to identify any prior ischemic evaluation/work-up but per 5/2019 cardiology note reported as nonischemic.  Transthoracic echocardiogram 3/2019 EF 20%, moderate-severe eccentric mitral regurgitation, mild aortic stenosis with mild aortic insufficiency.    -Compensated currently.  Monitor volume status closely.  Continue Toprol-XL based on current hemodynamics will reduce dose of this to 75 mg daily, changed lisinopril to be given in the evening and continue Lasix.    -Strict intake and output     Prolonged QTc in  "setting of LBBB  -Monitor electrolytes closely.  -No Zofran.  -Monitor and replace electrolytes     CKD-II  -Cr at baseline, monitor closely     Valvular heart disease:   -Moderate - severe MR, mild aortic stenosis with mild aortic insufficiency:  No evidence for pulmonary edema.  Monitor volume status closely.       Recent GI bleed  -3/2019 in setting of sepsis  - Hemoglobin stable   - Cont PPI     Left bundle branch block:   -Noted on preoperative EKG.  Appears new.  No anginal symptoms.     Acute hypomagnesemia:   -Monitor and replace per protocol     DVT prophylaxis  -ECASA 325mg daily, foot pumps, mobilization     Code status  -DNR     Disposition  -Had a peer to peer review with Dr Simeon from [facility] about the placement. After reviewing her case, Dr Simeon declined TCU.   -Patient is obviously not happy with that. She stated she is going to appeal it.   -At the moment, given her age (89 years), recent hip surgery for hip fracture that she sustained from a fall, she would greatly benefit from TCU stay for rehab. However, he insurance felt differently and didn't provide authorization. I do not believe the patient is safe to go back to her assisted living facility in this condition as she lives alone. Assisted living felt the same way and declined to accept her (per social service). She is at high risk of fall that can complicate her post surgical outcome. Per nursing staff (although she walked 80 feet yesterday with assistance of 1), she is still not able to get up from a chair on her own. PT/OT also strongly recommended TCU. PT's summary copied from the Chart:   \"Pt would benefit greatly from transitional care unit prior to returning home. Attempted amb w/ 4WW today (what pt usually uses) and pt was having inc difficulty and pain. Pt did do better with FWW, however still complaining of discomfort with ambulation and feeling unsteady/not safe. Pt having inc difficulty with standing from chair and bed mobility " "has not been seen during physical therapy sessions. Because pt lives alone and was independent prior to fall/fracture, TCU strongly recommended to prevent future falls or injuries leading to future hospitalizations. \"     The patient is now going to TCU on private pay; she would appeal the TCU stay with [employer] per report.      Overall stabilized and discharged to TCU on 7/3/19 for PT, OT, nursing cares, medical management and monitoring.     Today:  She is on scheduled tylenol for pain management, able to work with therapy. WBAT allowed. She denies chest pain. No dizziness. Appetite is fair. No abdominal pain, diarrhea or constipation. No fever or cough. BPs satisfactory. She is on aspirin for DVT prevention, monitor closely as she has hx of GIB. No new vision or hearing problems. She lives in [facility].      Past Medical History:  Past Medical History:   Diagnosis Date   ? Abnormal ECG    ? Atrial fibrillation (H)    ? CHF (congestive heart failure) (H)    ? DNR (do not resuscitate) / DNI    ? HTN (hypertension)    ? Nonsmoker    ? Osteoporosis     Completed 5 years of alendronate therapy.           Surgical History:  Past Surgical History:   Procedure Laterality Date   ? APPENDECTOMY     ? AL PERCUT FIX PROX/NECK FEMUR FX Left 6/28/2019    Procedure: INTERNAL FIXATION, FRACTURE, HIP, WITH PINS OR NAILS;  Surgeon: Rohith Galeano MD;  [place] [facility];  Service: Orthopedics       Family History:   Family History   Problem Relation Age of Onset   ? Stroke Mother    ? Leukemia Father        Social History:    Social History     Socioeconomic History   ? Marital status:      Spouse name: Not on file   ? Number of children: 0   ? Years of education: Not on file   ? Highest education level: Not on file   Occupational History   ? Occupation: Teacher,      Employer: RETIRED   Social Needs   ? Financial resource strain: Not on file   ? Food insecurity:     Worry: Not on file     " Inability: Not on file   ? Transportation needs:     Medical: Not on file     Non-medical: Not on file   Tobacco Use   ? Smoking status: Never Smoker   ? Smokeless tobacco: Never Used   Substance and Sexual Activity   ? Alcohol use: Yes     Comment: not regularly   ? Drug use: No   ? Sexual activity: Not on file   Lifestyle   ? Physical activity:     Days per week: Not on file     Minutes per session: Not on file   ? Stress: Not on file   Relationships   ? Social connections:     Talks on phone: Not on file     Gets together: Not on file     Attends [employer]ian service: Not on file     Active member of club or organization: Not on file     Attends meetings of clubs or organizations: Not on file     Relationship status: Not on file   ? Intimate partner violence:     Fear of current or ex partner: Not on file     Emotionally abused: Not on file     Physically abused: Not on file     Forced sexual activity: Not on file   Other Topics Concern   ? Not on file   Social History Narrative    Former patient of Dr. Damaris Billings.        No children.  Former .        POA for medical decisions is Araceli Villarreal.        Contact Alida Villarreal for results or issues - may leave message.    (Cell) - [phone]    Home phone - [phone]        Moved to [facility] in 2019.  Walks with a walker.  Remains active.        Medications;  Current Outpatient Medications   Medication Sig   ? aspirin 325 MG EC tablet Take 1 tablet (325 mg total) by mouth daily.   ? furosemide (LASIX) 40 MG tablet Take 1 tablet (40 mg total) by mouth daily.   ? lisinopril (PRINIVIL,ZESTRIL) 5 MG tablet Take 1 tablet (5 mg total) by mouth every evening.   ? metoprolol succinate (TOPROL-XL) 25 MG Take 3 tablets (75 mg total) by mouth daily.   ? omeprazole (PRILOSEC) 20 MG capsule Take 1 capsule (20 mg total) by mouth daily before breakfast.       Allergies:  Allergies   Allergen Reactions   ? Penicillins Swelling       Review of  Systems:  Pertinent items are noted in HPI.      Physical Exam:   General: Patient is alert, pleasant elderly female, no distress.   Vitals: /86, Temp 97.5, Pulse 78, RR 20, O2 sat 96% RA.  HEENT: Head is NCAT. Eyes show no injection or icterus. Nares negative. Oropharynx well hydrated.  Neck: Supple. No tenderness or adenopathy. No JVD.  Lungs: Clear bilaterally. No wheezes.  Cardiovascular: Regular rate and rhythm, murmur noted.  Back: No spinal or CVA tenderness.  Abdomen: Soft, no tenderness on exam. Bowel sounds present. No guarding rebound or rigidity.  : Deferred.  Extremities: No signif distal edema is noted.  Musculoskeletal: Age related degen changes.  Skin: Surgical incisions left hip covered.  Psych: Mood appears good.      Labs:  Lab Results   Component Value Date    WBC 8.2 07/02/2019    HGB 11.2 (L) 07/02/2019    HCT 36.4 07/02/2019    MCV 92 07/02/2019     07/02/2019     Results for orders placed or performed during the hospital encounter of 06/27/19   Basic Metabolic Panel   Result Value Ref Range    Sodium 139 136 - 145 mmol/L    Potassium 3.9 3.5 - 5.0 mmol/L    Chloride 105 98 - 107 mmol/L    CO2 28 22 - 31 mmol/L    Anion Gap, Calculation 6 5 - 18 mmol/L    Glucose 81 70 - 125 mg/dL    Calcium 8.8 8.5 - 10.5 mg/dL    BUN 22 8 - 28 mg/dL    Creatinine 0.79 0.60 - 1.10 mg/dL    GFR MDRD Af Amer >60 >60 mL/min/1.73m2    GFR MDRD Non Af Amer >60 >60 mL/min/1.73m2       Assessment/Plan:  1. Left hip fracture. Sustained in a fall. S/p surgery on 6/28/19. WBAT, continue in therapy. Follow up with ortho.   2. CM. Reduced EF. No chest pain or hypoxia or shortness of breath. Monitor clinically. Continue lasix.   3. Afib. Adequate rate control. Not on AC typically, currently on aspirin for DVT prevention post op.  4. HTN. On lisinopril. Monitor BPs.   5. CKD. Labs as noted.  6. Anemia. Mild ABLA from surgery.  7. Hx GIB. Cont PPI. Monitor closely.  8. Code status is DNR.              Electronically signed by: Lora Hernandez MD

## 2021-06-19 NOTE — LETTER
Letter by Mili Hylton CNP at      Author: Mili Hylton CNP Service: -- Author Type: --    Filed:  Encounter Date: 7/9/2019 Status: (Other)         Patient: Pinky Duggan   MR Number: [id number]   YOB: 1924   Date of Visit: 7/9/2019     [facility]    Facility:   [facility] [[id number]]   Code Status: DNR      CHIEF COMPLAINT/REASON FOR VISIT:  Chief Complaint   Patient presents with   ? Review Of Multiple Medical Conditions       HISTORY:      HPI: Pinky is a 89 y.o. female undergoing physical and occupational therapy at [facility]. She is with history of osteoporosis and recently diagnosed nonischemic cardiomyopathy with ejection fraction of 20% as well as mitral valve disease admitted to the hospital with acute left femoral neck fracture from a fall. She underwent closed reduction and percutaneous screw fixation of L femoral neck fracture    Today she is seen for a routine first visit to review multiple medical issues. She denied CP or shortness of breath. She is moving her bowels and denied urinary issues. She had no cough or congestion. Left hip incision is with staples C/D/I. Her weights are stable and she reports pain controlled.     Past Medical History:   Diagnosis Date   ? Abnormal ECG    ? Atrial fibrillation (H)    ? CHF (congestive heart failure) (H)    ? DNR (do not resuscitate) / DNI    ? HTN (hypertension)    ? Nonsmoker    ? Osteoporosis     Completed 5 years of alendronate therapy.             Family History   Problem Relation Age of Onset   ? Stroke Mother    ? Leukemia Father      Social History     Socioeconomic History   ? Marital status:      Spouse name: Not on file   ? Number of children: 0   ? Years of education: Not on file   ? Highest education level: Not on file   Occupational History   ? Occupation: Teacher,      Employer: RETIRED   Social Needs   ? Financial resource strain:  Not on file   ? Food insecurity:     Worry: Not on file     Inability: Not on file   ? Transportation needs:     Medical: Not on file     Non-medical: Not on file   Tobacco Use   ? Smoking status: Never Smoker   ? Smokeless tobacco: Never Used   Substance and Sexual Activity   ? Alcohol use: Yes     Comment: not regularly   ? Drug use: No   ? Sexual activity: Not on file   Lifestyle   ? Physical activity:     Days per week: Not on file     Minutes per session: Not on file   ? Stress: Not on file   Relationships   ? Social connections:     Talks on phone: Not on file     Gets together: Not on file     Attends Gnosticism service: Not on file     Active member of club or organization: Not on file     Attends meetings of clubs or organizations: Not on file     Relationship status: Not on file   ? Intimate partner violence:     Fear of current or ex partner: Not on file     Emotionally abused: Not on file     Physically abused: Not on file     Forced sexual activity: Not on file   Other Topics Concern   ? Not on file   Social History Narrative    Former patient of Dr. Damaris Billings.        No children.  Former .        POA for medical decisions is Nephaditya Villarreal.        Contact Alida Villarreal for results or issues - may leave message.    (Cell) - [phone]    Home phone - [phone]        Moved to [facility] in 2019.  Walks with a walker.  Remains active.         Review of Systems   Constitutional: Positive for fatigue. Negative for activity change, appetite change and fever.   HENT: Negative for congestion.    Respiratory: Negative for cough, shortness of breath and wheezing.    Cardiovascular: Negative for chest pain and leg swelling.   Gastrointestinal: Negative for abdominal distention, abdominal pain, constipation, diarrhea and nausea.   Genitourinary: Negative for dysuria.   Musculoskeletal: Positive for arthralgias. Negative for back pain.   Skin: Positive for wound. Negative for color  change.   Neurological: Negative for dizziness.   Psychiatric/Behavioral: Negative for agitation, behavioral problems and confusion.       .  Vitals:    07/09/19 0842   BP: 139/86   Pulse: 83   Resp: 16   Temp: 97.5  F (36.4  C)   SpO2: 95%   Weight: 101 lb (45.8 kg)       Physical Exam   Constitutional: She is oriented to person, place, and time. She appears well-developed and well-nourished.   Pleasant woman in no acute distress   HENT:   Head: Normocephalic and atraumatic.   Eyes: Pupils are equal, round, and reactive to light. Conjunctivae are normal.   Neck: Normal range of motion. Neck supple.   Cardiovascular: Normal rate, regular rhythm and normal heart sounds.   No murmur heard.  Pulmonary/Chest: Effort normal and breath sounds normal. She has no wheezes. She has no rales.   Abdominal: Soft. Bowel sounds are normal. She exhibits no distension. There is no tenderness.   Musculoskeletal: Normal range of motion. She exhibits no edema.   Neurological: She is alert and oriented to person, place, and time.   Skin: Skin is warm and dry.   Left hip incision with staples C/D/I  Fading bruise left cheek.    Psychiatric: She has a normal mood and affect. Her behavior is normal.         LABS:   Recent Results (from the past 240 hour(s))   HM2(CBC w/o Differential)   Result Value Ref Range    WBC 9.5 4.0 - 11.0 thou/uL    RBC 3.85 3.80 - 5.40 mill/uL    Hemoglobin 10.9 (L) 12.0 - 16.0 g/dL    Hematocrit 35.6 35.0 - 47.0 %    MCV 93 80 - 100 fL    MCH 28.3 27.0 - 34.0 pg    MCHC 30.6 (L) 32.0 - 36.0 g/dL    RDW 14.2 11.0 - 14.5 %    Platelets 185 140 - 440 thou/uL    MPV 10.8 8.5 - 12.5 fL   Comprehensive Metabolic Panel   Result Value Ref Range    Sodium 137 136 - 145 mmol/L    Potassium 4.0 3.5 - 5.0 mmol/L    Chloride 102 98 - 107 mmol/L    CO2 27 22 - 31 mmol/L    Anion Gap, Calculation 8 5 - 18 mmol/L    Glucose 77 70 - 125 mg/dL    BUN 23 8 - 28 mg/dL    Creatinine 0.92 0.60 - 1.10 mg/dL    GFR MDRD Af Amer >60 >60  mL/min/1.73m2    GFR MDRD Non Af Amer 57 (L) >60 mL/min/1.73m2    Bilirubin, Total 0.3 0.0 - 1.0 mg/dL    Calcium 8.7 8.5 - 10.5 mg/dL    Protein, Total 6.0 6.0 - 8.0 g/dL    Albumin 2.8 (L) 3.5 - 5.0 g/dL    Alkaline Phosphatase 54 45 - 120 U/L    AST 14 0 - 40 U/L    ALT <9 0 - 45 U/L   Magnesium   Result Value Ref Range    Magnesium 1.9 1.8 - 2.6 mg/dL   Magnesium   Result Value Ref Range    Magnesium 2.1 1.8 - 2.6 mg/dL   HM2(CBC w/o Differential)   Result Value Ref Range    WBC 8.2 4.0 - 11.0 thou/uL    RBC 3.95 3.80 - 5.40 mill/uL    Hemoglobin 11.2 (L) 12.0 - 16.0 g/dL    Hematocrit 36.4 35.0 - 47.0 %    MCV 92 80 - 100 fL    MCH 28.4 27.0 - 34.0 pg    MCHC 30.8 (L) 32.0 - 36.0 g/dL    RDW 14.4 11.0 - 14.5 %    Platelets 215 140 - 440 thou/uL    MPV 10.6 8.5 - 12.5 fL   Basic Metabolic Panel   Result Value Ref Range    Sodium 139 136 - 145 mmol/L    Potassium 3.9 3.5 - 5.0 mmol/L    Chloride 105 98 - 107 mmol/L    CO2 28 22 - 31 mmol/L    Anion Gap, Calculation 6 5 - 18 mmol/L    Glucose 81 70 - 125 mg/dL    Calcium 8.8 8.5 - 10.5 mg/dL    BUN 22 8 - 28 mg/dL    Creatinine 0.79 0.60 - 1.10 mg/dL    GFR MDRD Af Amer >60 >60 mL/min/1.73m2    GFR MDRD Non Af Amer >60 >60 mL/min/1.73m2       ASSESSMENT:      ICD-10-CM    1. S/P ORIF (open reduction internal fixation) fracture Z96.7     Z87.81    2. Essential hypertension I10    3. Paroxysmal atrial fibrillation (H) I48.0        PLAN:    S/P  ORIF left  Hip- PT/OT, pain control, monitor incision  for S/S infection  Hypertension - on metoprolol, lisinopril, lasix  Atrial fibrillation continue metoprolol, ASA  Pain  Control- on Tylenol  Anticoagulation therapy on 325 mg ASA daily   GERD on omeprazole     Electronically signed by: Mili Hylton CNP

## 2021-06-19 NOTE — LETTER
Letter by Mili Hylton CNP at      Author: Mili Hylton CNP Service: -- Author Type: --    Filed:  Encounter Date: 3/22/2019 Status: (Other)         Patient: Pinky Duggan   MR Number: [id number]   YOB: 1924   Date of Visit: 3/22/2019     Not seen

## 2021-06-19 NOTE — LETTER
Letter by Mili Hylton CNP at      Author: Mili Hylton CNP Service: -- Author Type: --    Filed:  Encounter Date: 4/2/2019 Status: (Other)         Patient: Pinky Duggan   MR Number: [id number]   YOB: 1924   Date of Visit: 4/2/2019     [facility]    Facility:   [facility] [[id number]]   Code Status: DNR  PCP: Douglas Sosa MD   Phone: [phone]   Fax: [phone]      CHIEF COMPLAINT/REASON FOR VISIT:  Chief Complaint   Patient presents with   ? Discharge Summary       HISTORY COURSE:  Pinky is a 89 y.o. female undergoing physical and occupational therapy at [facility].  She is with history of hypertension and daily aspirin use, atrial fibrillation, dilated cardiomyopathy.  She presented to the hospital with 7 days of poor appetite, 24 hours of melena, weakness and shortness of breath.  She was found to have a hemoglobin of 10.8 and evidence of sepsis with new A. fib with RVR.  Per the records she did discharged from [facility] the day before and was declined a TCU by her insurance at that time     Today she is seen for a face to face for discharge. She will be moving to [facility] on 4/3/19 with services and escorts to meals.    She denies chest pain or shortness of breath.  She  has not had  melena since coming to this facility.   She reports no pain and has never had any pain.   She does have severe scoliosis.Her weight is down 3.4 pounds in the last 2 weeks.      Review of Systems  Constitutional: Positive for fatigue. Negative for activity change, appetite change and fever.   HENT: Negative for congestion.    Respiratory: Negative for cough, shortness of breath and wheezing.    Cardiovascular: Negative for chest pain and leg swelling.   Gastrointestinal: Negative for abdominal distention, abdominal pain, constipation, diarrhea and nausea.   Genitourinary: Negative for dysuria.   Musculoskeletal: Negative  for arthralgias and back pain.        Severe scoliosis   Skin: Negative for color change and wound.   Neurological: Negative for dizziness.   Psychiatric/Behavioral: Negative for agitation, behavioral problems and confusion.          Vitals:    04/02/19 0743   BP: 110/65   Pulse: 80   Resp: 16   Temp: 98.6  F (37  C)   SpO2: 93%   Weight: (!) 93 lb 9.6 oz (42.5 kg)       Physical Exam  Constitutional: She is oriented to person, place, and time. She appears well-developed and well-nourished.   Pleasant woman in no acute distress   HENT:   Head: Normocephalic and atraumatic.   Eyes: Conjunctivae are normal. Pupils are equal, round, and reactive to light.   Neck: Normal range of motion. Neck supple.   Cardiovascular: Normal rate, regular rhythm and normal heart sounds.   No murmur heard.  Pulmonary/Chest: Effort normal and breath sounds normal. She has no wheezes. She has no rales.   Abdominal: Soft. Bowel sounds are normal. She exhibits no distension. There is no tenderness.   Musculoskeletal: Normal range of motion. She exhibits no edema.   Neurological: She is alert and oriented to person, place, and time.   Skin: Skin is warm and dry.   Psychiatric: She has a normal mood and affect. Her behavior is normal.          MEDICATION LIST:  Current Outpatient Medications   Medication Sig   ? acetaminophen (TYLENOL) 325 MG tablet Take 2 tablets (650 mg total) by mouth every 6 (six) hours as needed.   ? furosemide (LASIX) 40 MG tablet Take 1 tablet (40 mg total) by mouth daily. (Patient taking differently: Take 40 mg by mouth daily.       )   ? lisinopril (PRINIVIL,ZESTRIL) 5 MG tablet Take 1 tablet (5 mg total) by mouth daily. (Patient taking differently: Take 5 mg by mouth daily.       )   ? magnesium chloride (SLOW-MAG) 64 mg TbEC delayed-release tablet Take 1 tablet (64 mg total) by mouth daily. (Patient taking differently: Take 64 mg by mouth daily.       )   ? metoprolol succinate (TOPROL-XL) 100 MG 24 hr tablet Take 1  tablet (100 mg total) by mouth daily. (Patient taking differently: Take 100 mg by mouth daily.       )   ? omeprazole (PRILOSEC) 20 MG capsule Take 1 capsule (20 mg total) by mouth 2 (two) times a day before meals. (Patient taking differently: Take 20 mg by mouth 2 (two) times a day before meals.       )   ? potassium chloride (K-DUR,KLOR-CON) 20 MEQ tablet Take 1 tablet (20 mEq total) by mouth daily. (Patient taking differently: Take 20 mEq by mouth daily.       )   ? tetrahydrozoline (VISINE) 0.05 % ophthalmic solution Apply 1 drop to eye at bedtime as needed.       DISCHARGE DIAGNOSIS:    ICD-10-CM    1. Essential hypertension I10    2. Atrial fibrillation, unspecified type (H) I48.91    3. Weakness R53.1    4. Melena K92.1        MEDICAL EQUIPMENT NEEDS:  None needed    DISCHARGE PLAN/FACE TO FACE:  I certify that services are/were furnished while this patient was under the care of a physician and that a physician or an allowed non-physician practitioner (NPP), had a face-to-face encounter that meets the physician face-to-face encounter requirements. The encounter was in whole, or in part, related to the primary reason for home health. The patient is confined to his/her home and needs intermittent skilled nursing, physical therapy, speech-language pathology, or the continued need for occupational therapy. A plan of care has been established by a physician and is periodically reviewed by a physician.  Date of Face-to-Face Encounter: 4/2/19    I certify that, based on my findings, the following services are medically necessary home health services: PT/OT/HHA and Nursing It is  also recommended escorts to meals.     My clinical findings support the need for the above skilled services because: She is easily fatigued following recent hospitalization for shortness of breath, melena and weakness.     This patient is homebound because: She is deconditioned and easily fatigued and requiring continued therapy     The  patient is, or has been, under my care and I have initiated the establishment of the plan of care. This patient will be followed by a physician who will periodically review the plan of care.    Schedule follow up visit with primary care provider within 7 days to reestablish care.    Electronically signed by: Mili Hylton CNP

## 2021-06-19 NOTE — LETTER
Letter by Mili Hylton CNP at      Author: Mili Hylton CNP Service: -- Author Type: --    Filed:  Encounter Date: 3/18/2019 Status: (Other)         Patient: Pinky Duggan   MR Number: [id number]   YOB: 1924   Date of Visit: 3/18/2019     [facility]    Facility:   [facility] [[id number]]   Code Status: DNR      CHIEF COMPLAINT/REASON FOR VISIT:  Chief Complaint   Patient presents with   ? Review Of Multiple Medical Conditions       HISTORY:      HPI: Pinky is a 89 y.o. female undergoing physical and occupational therapy at [facility].  She is with history of hypertension and daily aspirin use, atrial fibrillation, dilated cardiomyopathy.  She presented to the hospital with 7 days of poor appetite, 24 hours of melena, weakness and shortness of breath.  She was found to have a hemoglobin of 10.8 and evidence of sepsis with new A. fib with RVR.  Per the records she did discharged from [facility] the day before and was declined a TCU by her insurance at that time    Today she is seen as a routine first visit to review multiple medical issues.  She denies chest pain or shortness of breath.  She tells me her melena stopped prior to leaving the hospital.  She reports no pain and has never had any pain.  She was also seen today for a low blood pressure yesterday of 98/70, today she is 118/64 she denies any dizziness.  She does have severe scoliosis.    Past Medical History:   Diagnosis Date   ? DNR (do not resuscitate) / DNI    ? HTN (hypertension)    ? Nonsmoker    ? Osteoporosis     Completed 5 years of alendronate therapy.             Family History   Problem Relation Age of Onset   ? Stroke Mother    ? Leukemia Father      Social History     Socioeconomic History   ? Marital status:      Spouse name: Not on file   ? Number of children: 0   ? Years of education: Not on file   ? Highest education level: Not on file    Occupational History   ? Occupation: Teacher,      Employer: RETIRED   Social Needs   ? Financial resource strain: Not on file   ? Food insecurity:     Worry: Not on file     Inability: Not on file   ? Transportation needs:     Medical: Not on file     Non-medical: Not on file   Tobacco Use   ? Smoking status: Never Smoker   ? Smokeless tobacco: Never Used   Substance and Sexual Activity   ? Alcohol use: No   ? Drug use: No   ? Sexual activity: Not on file   Lifestyle   ? Physical activity:     Days per week: Not on file     Minutes per session: Not on file   ? Stress: Not on file   Relationships   ? Social connections:     Talks on phone: Not on file     Gets together: Not on file     Attends Taoist service: Not on file     Active member of club or organization: Not on file     Attends meetings of clubs or organizations: Not on file     Relationship status: Not on file   ? Intimate partner violence:     Fear of current or ex partner: Not on file     Emotionally abused: Not on file     Physically abused: Not on file     Forced sexual activity: Not on file   Other Topics Concern   ? Not on file   Social History Narrative    Former patient of Dr. Damaris Billings.        No children.  Former .        POA for medical decisions is Araceli Villarreal.         Review of Systems   Constitutional: Positive for fatigue. Negative for activity change, appetite change and fever.   HENT: Negative for congestion.    Respiratory: Negative for cough, shortness of breath and wheezing.    Cardiovascular: Negative for chest pain and leg swelling.   Gastrointestinal: Negative for abdominal distention, abdominal pain, constipation, diarrhea and nausea.   Genitourinary: Negative for dysuria.   Musculoskeletal: Negative for arthralgias and back pain.        Severe scoliosis   Skin: Negative for color change and wound.   Neurological: Negative for dizziness.   Psychiatric/Behavioral: Negative for agitation,  behavioral problems and confusion.       .  Vitals:    03/18/19 1309   BP: 118/64   Pulse: 80   Resp: 16   Temp: 98.7  F (37.1  C)   SpO2: 97%   Weight: (!) 96 lb 12.8 oz (43.9 kg)       Physical Exam   Constitutional: She is oriented to person, place, and time. She appears well-developed and well-nourished.   Pleasant woman in no acute distress   HENT:   Head: Normocephalic and atraumatic.   Eyes: Conjunctivae are normal. Pupils are equal, round, and reactive to light.   Neck: Normal range of motion. Neck supple.   Cardiovascular: Normal rate, regular rhythm and normal heart sounds.   No murmur heard.  Pulmonary/Chest: Effort normal and breath sounds normal. She has no wheezes. She has no rales.   Abdominal: Soft. Bowel sounds are normal. She exhibits no distension. There is no tenderness.   Musculoskeletal: Normal range of motion. She exhibits no edema.   Neurological: She is alert and oriented to person, place, and time.   Skin: Skin is warm and dry.   Psychiatric: She has a normal mood and affect. Her behavior is normal.         LABS:   Recent Results (from the past 240 hour(s))   ECG 12 lead nursing unit performed   Result Value Ref Range    SYSTOLIC BLOOD PRESSURE 121 mmHg    DIASTOLIC BLOOD PRESSURE 59 mmHg    VENTRICULAR RATE 123 BPM    ATRIAL RATE 136 BPM    P-R INTERVAL  ms    QRS DURATION 148 ms    Q-T INTERVAL 356 ms    QTC CALCULATION (BEZET) 509 ms    P Axis  degrees    R AXIS 41 degrees    T AXIS 147 degrees    MUSE DIAGNOSIS       Atrial fibrillation with rapid ventricular response with premature ventricular or aberrantly conducted complexes  Left bundle branch block  Abnormal ECG  When compared with ECG of 02-FEB-2012 09:35,  Atrial fibrillation has replaced Sinus rhythm  Left bundle branch block is now Present  Minimal criteria for Septal infarct are no longer Present  Confirmed by FREIDA THIBODEAUX MD LOC:[facility] ([id number]) on 3/9/2019 1:01:01 PM     Blood culture from PERIPHERAL SITE   Result Value Ref  Range    Anaerobic Blood Culture Bottle No Growth No Growth, No organisms seen, bottle returned to instrument, Specimen not received    Aerobic Blood Culture Bottle No Growth No Growth, No organisms seen, bottle returned to instrument, Specimen not received   Blood Culture from PERIPHERAL SITE (2nd one)   Result Value Ref Range    Anaerobic Blood Culture Bottle No Growth No Growth, No organisms seen, bottle returned to instrument, Specimen not received    Aerobic Blood Culture Bottle No Growth No Growth, No organisms seen, bottle returned to instrument, Specimen not received   Basic Metabolic Panel   Result Value Ref Range    Sodium 136 136 - 145 mmol/L    Potassium 3.2 (L) 3.5 - 5.0 mmol/L    Chloride 100 98 - 107 mmol/L    CO2 22 22 - 31 mmol/L    Anion Gap, Calculation 14 5 - 18 mmol/L    Glucose 104 70 - 125 mg/dL    Calcium 9.4 8.5 - 10.5 mg/dL    BUN 66 (H) 8 - 28 mg/dL    Creatinine 0.95 0.60 - 1.10 mg/dL    GFR MDRD Af Amer >60 >60 mL/min/1.73m2    GFR MDRD Non Af Amer 55 (L) >60 mL/min/1.73m2   Hepatic Profile   Result Value Ref Range    Bilirubin, Total 0.7 0.0 - 1.0 mg/dL    Bilirubin, Direct 0.3 <=0.5 mg/dL    Protein, Total 7.4 6.0 - 8.0 g/dL    Albumin 2.9 (L) 3.5 - 5.0 g/dL    Alkaline Phosphatase 75 45 - 120 U/L    AST 23 0 - 40 U/L    ALT 22 0 - 45 U/L   Lactic Acid   Result Value Ref Range    Lactic Acid 1.7 0.5 - 2.2 mmol/L   Lipase   Result Value Ref Range    Lipase 15 0 - 52 U/L   INR   Result Value Ref Range    INR 1.19 (H) 0.90 - 1.10   Troponin I   Result Value Ref Range    Troponin I 0.11 0.00 - 0.29 ng/mL   Magnesium   Result Value Ref Range    Magnesium 2.0 1.8 - 2.6 mg/dL   BNP(B-type Natriuretic Peptide)   Result Value Ref Range    BNP 1,935 (H) 0 - 167 pg/mL   HM1 (CBC with Diff)   Result Value Ref Range    WBC 20.6 (H) 4.0 - 11.0 thou/uL    RBC 3.52 (L) 3.80 - 5.40 mill/uL    Hemoglobin 10.8 (L) 12.0 - 16.0 g/dL    Hematocrit 33.0 (L) 35.0 - 47.0 %    MCV 94 80 - 100 fL    MCH 30.7  27.0 - 34.0 pg    MCHC 32.7 32.0 - 36.0 g/dL    RDW 12.7 11.0 - 14.5 %    Platelets 214 140 - 440 thou/uL    MPV 11.7 8.5 - 12.5 fL    Neutrophils % 91 (H) 50 - 70 %    Lymphocytes % 3 (L) 20 - 40 %    Monocytes % 6 2 - 10 %    Eosinophils % 0 0 - 6 %    Basophils % 0 0 - 2 %    Neutrophils Absolute 18.6 (H) 2.0 - 7.7 thou/uL    Lymphocytes Absolute 0.6 (L) 0.8 - 4.4 thou/uL    Monocytes Absolute 1.2 (H) 0.0 - 0.9 thou/uL    Eosinophils Absolute 0.0 0.0 - 0.4 thou/uL    Basophils Absolute 0.0 0.0 - 0.2 thou/uL   Type and Screen   Result Value Ref Range    ABORh O POS     Antibody Screen Negative Negative   Urinalysis   Result Value Ref Range    Color, UA Yellow Colorless, Yellow, Straw, Light Yellow    Clarity, UA Cloudy (!) Clear    Glucose, UA Negative Negative    Bilirubin, UA Negative Negative    Ketones, UA Trace (!) Negative    Specific Gravity, UA 1.016 1.001 - 1.030    Blood, UA Large (!) Negative    pH, UA 5.5 4.5 - 8.0    Protein, UA 30 mg/dL (!) Negative mg/dL    Urobilinogen, UA <2.0 E.U./dL <2.0 E.U./dL, 2.0 E.U./dL    Nitrite, UA Positive (!) Negative    Leukocytes, UA Large (!) Negative    Bacteria, UA Many (!) None Seen hpf    RBC, UA 5-10 (!) None Seen, 0-2 hpf    WBC, UA 10-25 (!) None Seen, 0-5 hpf    Squam Epithel, UA 5-10 (!) None Seen, 0-5 lpf   Culture, Urine   Result Value Ref Range    Culture >100,000 col/ml Escherichia coli (!)        Susceptibility    Escherichia coli - GINNY     Amikacin <=8 Sensitive      Amoxicillin + Clavulanate <=4/2 Sensitive      Ampicillin <=4 Sensitive      Cefazolin 8 Sensitive      Cefepime <=1 Sensitive      Ceftriaxone <=1 Sensitive      Ciprofloxacin <=0.5 Sensitive      Gentamicin >8 Resistant      Imipenem <=0.25 Sensitive      Levofloxacin <=1 Sensitive      Meropenem <=0.25 Sensitive      Nitrofurantoin <=16 Sensitive      Tetracycline >8 Resistant      Tobramycin 8 Intermediate      Trimethoprim + Sulfamethoxazole <=0.5 Sensitive    POCT occult blood stool    Result Value Ref Range    POC Fecal Occult Bld Positive (!) Negative    Lot Number [id number]     Expiration Date 2/21     Diluent/Developer Lot Number [id number]     Diluent/Developer Expiration Date 11/21     Pos Control Valid Control Valid Control    Neg Control Valid Control Valid Control   Hemoglobin   Result Value Ref Range    Hemoglobin 11.2 (L) 12.0 - 16.0 g/dL   Hemogram with PLT   Result Value Ref Range    WBC 21.6 (H) 4.0 - 11.0 thou/uL    RBC 3.28 (L) 3.80 - 5.40 mill/uL    Hemoglobin 10.0 (L) 12.0 - 16.0 g/dL    Hematocrit 31.4 (L) 35.0 - 47.0 %    MCV 96 80 - 100 fL    MCH 30.5 27.0 - 34.0 pg    MCHC 31.8 (L) 32.0 - 36.0 g/dL    RDW 13.1 11.0 - 14.5 %    Platelets 228 140 - 440 thou/uL    MPV 11.9 8.5 - 12.5 fL   Troponin I   Result Value Ref Range    Troponin I 0.19 0.00 - 0.29 ng/mL   Basic Metabolic Panel   Result Value Ref Range    Sodium 138 136 - 145 mmol/L    Potassium 4.0 3.5 - 5.0 mmol/L    Chloride 110 (H) 98 - 107 mmol/L    CO2 18 (L) 22 - 31 mmol/L    Anion Gap, Calculation 10 5 - 18 mmol/L    Glucose 105 70 - 125 mg/dL    Calcium 8.0 (L) 8.5 - 10.5 mg/dL    BUN 47 (H) 8 - 28 mg/dL    Creatinine 0.81 0.60 - 1.10 mg/dL    GFR MDRD Af Amer >60 >60 mL/min/1.73m2    GFR MDRD Non Af Amer >60 >60 mL/min/1.73m2   TSH   Result Value Ref Range    TSH 2.51 0.30 - 5.00 uIU/mL   Magnesium   Result Value Ref Range    Magnesium 1.7 (L) 1.8 - 2.6 mg/dL   Culture, Stool   Result Value Ref Range    Culture       No Salmonella, Shigella, Yersinia, or Campylobacter.   C. Diff Toxin By PCR   Result Value Ref Range    C.Difficile Toxigenic by PCR Negative Negative    Ribotype 027/NAP1/B1 Presumptive Negative Presumptive Negative   EnteroHaemorrhagic E. coli Work Up   Result Value Ref Range    Shiga Toxin 1 Negative Negative    Shiga Toxin 2 Negative Negative   Echo Complete   Result Value Ref Range    LV volume diastolic 163 (!) 46 - 106 cm3    LV volume systolic 131 (!) 14 - 42 cm3    IVSd 0.837 0.6 - 0.9 cm    LVIDd  6.06 (!) 3.8 - 5.2 cm    LVIDs 5.4 (!) 2.2 - 3.5 cm    LVOT diam 1.7 cm    LVOT mean gradient 1 mmHg    LVOT peak VTI 15.6 cm    LVOT mean jolly 49.2 cm/s    LVOT peak jolly 75 cm/s    LVOT peak gradient 2 mmHg    LV PWd 0.857 0.6 - 0.9 cm    MV E' lat jolly 3.81 cm/s    MV E' lat jolly 3.81 cm/s    MV E' med jolly 3.59 cm/s    MV E' med jolly 3.59 cm/s    AR decel slope 2,270 mm/s2    AR p 1/2 time 491 ms    AR peak jolly 307 cm/s    AV peak jolly 219 cm/s    AV mean jolly 160 cm/s    AV mean gradient 12 mmHg    AV VTI 49.5 cm    AO root 2.7 cm    LA size 4.7 cm    LA length 6.8 cm    MR mean velocity 353 cm/s    MR mean gradient 58 mmHg    MR  cm    MV mean jolly 86.3 cm/s    MV mean gradient 3 mmHg    MV VTI 48.2 cm    MR PISA peak jolly 504 cm/s    MV peak velocityoctiy 145 cm/s    MV decel slope 4,840 mm/s2    MV decel time 261 ms    MV P 1/2 time 96 ms    MV peak A jolly 130 cm/s    MV peak E jolly 153 cm/s    MR flow 56 cm3    TR peak jolly 296 cm/s    LA area 2 33.9 cm2    LA area 1 31.8 cm2    BSA 1.38 m2    Hieght 60 in    Weight 1,612.8 lbs    /60 mmHg    HR 94 bpm    IVS/PW ratio 1.0     MR peak gradent 101.6 mmHg    TR peak gradent 35.0 mmHg    LV FS 10.9 28 - 44 %    Echo LVEF calculated 20 55 - 75 %    LA volume 134.8 mL    LV mass 203.3 g    AV area 0.7 cm2    AV DIM IND jolly 0.3     MV area p 1/2 time 2.3 cm2    MV area cont eq 0.7 cm2    MV E/A Ratio 1.2     LVOT area 2.27 cm2    MR PISA VN Nyq 30.80 cm/s    MR PISA radius 1.0 cm    LVOT SV 35.4 cm3    AV peak gradient 19.2 mmHg    MV peak gradient 8.4 mmHg    MR PISA EROA 0.4 cm2    MR volume 56.4 cc    LV systolic volume index 94.9 8 - 24 cm3/m2    LV diastolic volume index 118.1 29 - 61 cm3/m2    LA volume index 97.6 mL/m2    LV mass index 147.3 g/m2    LV SVi 25.6 ml/m2    TAPSE 2.1 cm    LV CO 3.3 l/min    LV Ci 2.4 l/min/m2    Height 60.0 in    Weight 101 lbs    AR peak gradient 37.7 mmHg    AV DIM IND VTI 0.3     MVA VTI 0.73 cm2   Hemoglobin   Result Value  Ref Range    Hemoglobin 10.2 (L) 12.0 - 16.0 g/dL   Potassium - Next AM   Result Value Ref Range    Potassium 3.5 3.5 - 5.0 mmol/L   HM2(CBC w/o Differential)   Result Value Ref Range    WBC 15.8 (H) 4.0 - 11.0 thou/uL    RBC 3.04 (L) 3.80 - 5.40 mill/uL    Hemoglobin 9.2 (L) 12.0 - 16.0 g/dL    Hematocrit 29.5 (L) 35.0 - 47.0 %    MCV 97 80 - 100 fL    MCH 30.3 27.0 - 34.0 pg    MCHC 31.2 (L) 32.0 - 36.0 g/dL    RDW 13.2 11.0 - 14.5 %    Platelets 210 140 - 440 thou/uL    MPV 11.4 8.5 - 12.5 fL   Potassium   Result Value Ref Range    Potassium 3.8 3.5 - 5.0 mmol/L   Hemoglobin   Result Value Ref Range    Hemoglobin 9.9 (L) 12.0 - 16.0 g/dL   Magnesium   Result Value Ref Range    Magnesium 1.5 (L) 1.8 - 2.6 mg/dL   HM2(CBC w/o Differential)   Result Value Ref Range    WBC 14.9 (H) 4.0 - 11.0 thou/uL    RBC 3.33 (L) 3.80 - 5.40 mill/uL    Hemoglobin 10.1 (L) 12.0 - 16.0 g/dL    Hematocrit 31.9 (L) 35.0 - 47.0 %    MCV 96 80 - 100 fL    MCH 30.3 27.0 - 34.0 pg    MCHC 31.7 (L) 32.0 - 36.0 g/dL    RDW 13.2 11.0 - 14.5 %    Platelets 257 140 - 440 thou/uL    MPV 11.2 8.5 - 12.5 fL   Basic metabolic panel   Result Value Ref Range    Sodium 142 136 - 145 mmol/L    Potassium 3.4 (L) 3.5 - 5.0 mmol/L    Chloride 108 (H) 98 - 107 mmol/L    CO2 25 22 - 31 mmol/L    Anion Gap, Calculation 9 5 - 18 mmol/L    Glucose 97 70 - 125 mg/dL    Calcium 8.6 8.5 - 10.5 mg/dL    BUN 41 (H) 8 - 28 mg/dL    Creatinine 0.93 0.60 - 1.10 mg/dL    GFR MDRD Af Amer >60 >60 mL/min/1.73m2    GFR MDRD Non Af Amer 56 (L) >60 mL/min/1.73m2   Potassium   Result Value Ref Range    Potassium 3.4 (L) 3.5 - 5.0 mmol/L   Potassium   Result Value Ref Range    Potassium 3.6 3.5 - 5.0 mmol/L   Crossmatch   Result Value Ref Range    Crossmatch Compatible     Blood Expiration Date [id number]     Unit Type O Pos     Unit Number [id number]     Status Released     Component Red Blood Cells     PRODUCT CODE [id number]     Blood Type 5100     CODING SYSTEM  [id number]    Magnesium   Result Value Ref Range    Magnesium 1.7 (L) 1.8 - 2.6 mg/dL   Basic Metabolic Panel   Result Value Ref Range    Sodium 143 136 - 145 mmol/L    Potassium 3.9 3.5 - 5.0 mmol/L    Chloride 108 (H) 98 - 107 mmol/L    CO2 29 22 - 31 mmol/L    Anion Gap, Calculation 6 5 - 18 mmol/L    Glucose 91 70 - 125 mg/dL    Calcium 8.7 8.5 - 10.5 mg/dL    BUN 36 (H) 8 - 28 mg/dL    Creatinine 0.85 0.60 - 1.10 mg/dL    GFR MDRD Af Amer >60 >60 mL/min/1.73m2    GFR MDRD Non Af Amer >60 >60 mL/min/1.73m2   Hemoglobin   Result Value Ref Range    Hemoglobin 9.9 (L) 12.0 - 16.0 g/dL   ECG 12 lead   Result Value Ref Range    SYSTOLIC BLOOD PRESSURE  mmHg    DIASTOLIC BLOOD PRESSURE  mmHg    VENTRICULAR RATE 88 BPM    ATRIAL RATE 88 BPM    P-R INTERVAL 160 ms    QRS DURATION 118 ms    Q-T INTERVAL 386 ms    QTC CALCULATION (BEZET) 467 ms    P Axis 52 degrees    R AXIS 20 degrees    T AXIS 129 degrees    MUSE DIAGNOSIS       Normal sinus rhythm  Left ventricular hypertrophy with QRS widening and repolarization abnormality  Abnormal ECG  When compared with ECG of 08-MAR-2019 14:31,  Sinus rhythm has replaced Atrial fibrillation  Left bundle branch block is no longer Present  Confirmed by AUDREY SHERWOOD, CHADD LOC:[facility] ([id number]) on 3/12/2019 4:03:00 PM     Potassium - Next AM   Result Value Ref Range    Potassium 4.2 3.5 - 5.0 mmol/L   Basic Metabolic Panel   Result Value Ref Range    Sodium 142 136 - 145 mmol/L    Potassium 4.0 3.5 - 5.0 mmol/L    Chloride 105 98 - 107 mmol/L    CO2 30 22 - 31 mmol/L    Anion Gap, Calculation 7 5 - 18 mmol/L    Glucose 91 70 - 125 mg/dL    Calcium 8.6 8.5 - 10.5 mg/dL    BUN 40 (H) 8 - 28 mg/dL    Creatinine 0.98 0.60 - 1.10 mg/dL    GFR MDRD Af Amer >60 >60 mL/min/1.73m2    GFR MDRD Non Af Amer 53 (L) >60 mL/min/1.73m2   Magnesium   Result Value Ref Range    Magnesium 1.8 1.8 - 2.6 mg/dL   HM2(CBC w/o Differential)   Result Value Ref Range    WBC 10.0 4.0 - 11.0 thou/uL    RBC 3.20 (L) 3.80 -  5.40 mill/uL    Hemoglobin 9.9 (L) 12.0 - 16.0 g/dL    Hematocrit 29.9 (L) 35.0 - 47.0 %    MCV 93 80 - 100 fL    MCH 30.9 27.0 - 34.0 pg    MCHC 33.1 32.0 - 36.0 g/dL    RDW 13.2 11.0 - 14.5 %    Platelets 347 140 - 440 thou/uL    MPV 10.2 8.5 - 12.5 fL   Procalcitonin   Result Value Ref Range    Procalcitonin 0.13 0.00 - 0.49 ng/mL   HM2(CBC w/o Differential)   Result Value Ref Range    WBC 9.2 4.0 - 11.0 thou/uL    RBC 3.27 (L) 3.80 - 5.40 mill/uL    Hemoglobin 9.9 (L) 12.0 - 16.0 g/dL    Hematocrit 31.4 (L) 35.0 - 47.0 %    MCV 96 80 - 100 fL    MCH 30.3 27.0 - 34.0 pg    MCHC 31.5 (L) 32.0 - 36.0 g/dL    RDW 13.3 11.0 - 14.5 %    Platelets 321 140 - 440 thou/uL    MPV 10.2 8.5 - 12.5 fL   Basic Metabolic Panel   Result Value Ref Range    Sodium 141 136 - 145 mmol/L    Potassium 4.0 3.5 - 5.0 mmol/L    Chloride 104 98 - 107 mmol/L    CO2 29 22 - 31 mmol/L    Anion Gap, Calculation 8 5 - 18 mmol/L    Glucose 88 70 - 125 mg/dL    Calcium 8.4 (L) 8.5 - 10.5 mg/dL    BUN 35 (H) 8 - 28 mg/dL    Creatinine 0.91 0.60 - 1.10 mg/dL    GFR MDRD Af Amer >60 >60 mL/min/1.73m2    GFR MDRD Non Af Amer 58 (L) >60 mL/min/1.73m2     Current Outpatient Medications   Medication Sig   ? acetaminophen (TYLENOL) 325 MG tablet Take 2 tablets (650 mg total) by mouth every 6 (six) hours as needed.   ? furosemide (LASIX) 40 MG tablet Take 1 tablet (40 mg total) by mouth daily. (Patient taking differently: Take 40 mg by mouth daily.       )   ? lisinopril (PRINIVIL,ZESTRIL) 5 MG tablet Take 1 tablet (5 mg total) by mouth daily. (Patient taking differently: Take 5 mg by mouth daily.       )   ? magnesium chloride (SLOW-MAG) 64 mg TbEC delayed-release tablet Take 1 tablet (64 mg total) by mouth daily. (Patient taking differently: Take 64 mg by mouth daily.       )   ? metoprolol succinate (TOPROL-XL) 100 MG 24 hr tablet Take 1 tablet (100 mg total) by mouth daily. (Patient taking differently: Take 100 mg by mouth daily.       )   ?  omeprazole (PRILOSEC) 20 MG capsule Take 1 capsule (20 mg total) by mouth 2 (two) times a day before meals. (Patient taking differently: Take 20 mg by mouth 2 (two) times a day before meals.       )   ? potassium chloride (K-DUR,KLOR-CON) 20 MEQ tablet Take 1 tablet (20 mEq total) by mouth daily. (Patient taking differently: Take 20 mEq by mouth daily.       )   ? tetrahydrozoline (VISINE) 0.05 % ophthalmic solution Apply 1 drop to eye at bedtime as needed.     ASSESSMENT:      ICD-10-CM    1. Secondary hypertension I15.9    2. Melena K92.1    3. Paroxysmal atrial fibrillation (H) I48.0    4. Weakness R53.1        PLAN:    Deconditioning continue PT OT  Hypertension blood pressure softer, continue Lasix lisinopril and metoprolol with hold parameters.  Atrial fibrillation currently on metoprolol   Melena-currently resolved, hemoglobin stable continue Prilosec indefinitely, per the records GI seen previously and endoscopy should be deferred indefinitely and avoid aspirin indefinitely  Atrial fibrillation continue metoprolol and Lasix  Hypertension her home medications amlodipine and HCTZ were stopped her discharge and she was started on lisinopril and Lasix and also continue her metoprolol  Sepsis resolved per the records  likely urinary source. completed 5 days of Levaquin    Electronically signed by: Mili Hylton CNP

## 2021-06-20 NOTE — LETTER
Letter by Douglas Sosa MD at      Author: Douglas Sosa MD Service: -- Author Type: --    Filed:  Encounter Date: 8/8/2020 Status: (Other)         8/8/2020    Pinky Duggan   [address] [place] [address]         Dear Pinky,    It was good to see you in clinic.  I hope your questions were answered at the time of your visit.    The results of your tests from your visit were as follows:    Resulted Orders   HM2(CBC w/o Differential)   Result Value Ref Range    WBC 9.7 4.0 - 11.0 thou/uL    RBC 4.05 3.80 - 5.40 mill/uL    Hemoglobin 12.7 12.0 - 16.0 g/dL    Hematocrit 38.1 35.0 - 47.0 %    MCV 94 80 - 100 fL    MCH 31.3 27.0 - 34.0 pg    MCHC 33.3 32.0 - 36.0 g/dL    RDW 11.9 11.0 - 14.5 %    Platelets 243 140 - 440 thou/uL    MPV 8.2 7.0 - 10.0 fL   Basic Metabolic Panel   Result Value Ref Range    Sodium 140 136 - 145 mmol/L    Potassium 5.4 (H) 3.5 - 5.0 mmol/L    Chloride 106 98 - 107 mmol/L    CO2 20 (L) 22 - 31 mmol/L    Anion Gap, Calculation 14 5 - 18 mmol/L    Glucose 83 70 - 125 mg/dL    Calcium 9.5 8.5 - 10.5 mg/dL    BUN 38 (H) 8 - 28 mg/dL    Creatinine 1.19 (H) 0.60 - 1.10 mg/dL    GFR MDRD Af Amer 51 (L) >60 mL/min/1.73m2    GFR MDRD Non Af Amer 42 (L) >60 mL/min/1.73m2    Narrative    Fasting Glucose reference range is 70-99 mg/dL per  American Diabetes Association (ADA) guidelines.     Your blood counts are normal and you are not anemic.     Your kidney tests are stable.  Your potassium is slightly elevated.    Since your potassium is higher, please decrease your intake of bananas, tomatoes or tomato products, citrus fruits and juices, and potato products.      Please follow up again in 6 months.    If you have any questions regarding these results, please feel free to contact me at [phone].  I wish you the best of health!        Sincerely,      Douglas Sosa MD  General Internal Medicine  [facility]

## 2021-06-20 NOTE — LETTER
Letter by Douglas Sosa MD at      Author: Douglas Sosa MD Service: -- Author Type: --    Filed:  Encounter Date: 2/24/2020 Status: (Other)       Pinky George Duggan   [address] [place] [address]         Dear Pinky,    I am sending you this letter to remind you that you are due for a follow up visit in July.    Please call to schedule this visit as soon as possible as our schedule fills up quickly.    If you already have an appointment scheduled with me for around this time, please ignore this notification.    I look forward to seeing you soon.      If you have any questions regarding the above, please feel free to contact me at [phone].        Sincerely,    Douglas Sosa MD  General Internal Medicine  [facility]

## 2021-06-25 NOTE — PROGRESS NOTES
Hospital discharge call not made as pt is in ER for fatigue & weakness today.      Bev Holland RN Care Manager, Population Health

## 2021-06-25 NOTE — PROGRESS NOTES
[facility]      Facility:    [facility] [place] [facility] [[id number]]  Code Status: DNR       Chief Complaint/Reason for Visit:  Chief Complaint   Patient presents with     H & P     Admisssion note to TCU after hospitalization for weakness, recent GIB, recent UTI with sepsis. Hx CHF, afib.       HPI:   Pinky is a 89 y.o. female who was admitted to the hospital on 3/14/19 with weakness, also had prior admission. Hospital discharge summary as per below.    89 y.o. old female with h/o HTN, Afib, melena and recent sepsis just discharged from [facility] presents with decreased appetite. Teresa declined the patient for TCU at discharge on 3/13 from [facility] following completion of the [id number] Review.  The patient was discharged home with home care but per patient no one showed up at there home.  Patient reports she returned to the ED as due to her age of 89 and her multiple complex medical comorbidities after recent hospitalization for sepsis was unable to provide independent level of care at home and therefore was in an unsafe environment prompting her to re-presented to the emergency department as she is unable to provide adequate safe care independently at home.     Deconditioning and fatigue per [facility] [place] from recent hospitalization : patient would benefit from TCU in my opinion but her insurance has denied this. She is discharged to home with home care in extremely guarded prognosis.   As stated above patient is 89 with multiple complex, severe medical comorbidities, lives independently until recent acute decompensation for sepsis/illness resulting in current inability to provide adequate and, more importantly, safe care at home.  Furthermore, clear documentation from prior inpatient PT/OT assessments documenting need for TCU care at this time.    Patient once again declined by her insurance company, [employer], for TCU coverage.  Patient now paying out of pocket for TCU which she is  being discharged to today.     Recent Melena during her stay concern for upper GI bleed, possibly secondary to aspirin use.  No history of cirrhosis.    - Hgb stable   -- continue oral PPI therapy indefinately   -- GI seen previously endoscopy should be deferred indefinately  -- avoid ASA indefinitely  - no reports at this time     Atrial fibrillation rate controlled new diagnosis   -- continue metoprolol started last admission   -- defer anticoagulation indefinitely given above     Systolic CHF: TTE with severely reduced EF of 20%. Unclear etiology.  -- Cardiology seen previously started lasix and lisinopril, continue BB  -- CHF follow up as outpatient  - compensated at this time      Essential hypertension home amlodipine and HCTZ stopped at discharge   - Started lisinopril and lasix as above + Metoprolol   -Continue     Recent  Sepsis syndrome: Resolved. Found to be likely urinary source. Completed 5 days levaquin previously      Possible aspiration pneumonitis:  Per abnormal right lung opacities on recent CXR.  VSS negative and no signs or symptoms of aspiration per speech therapy evaluation.        Overall stabilized and discharged to TCU on 3/16/19 for PT, OT, nursing cares, medical management and monitoring.     Today:  She is feeling somewhat improved but overall still quite weak, would be unable to manage at home. Some dyspnea with exertion, no chest pain. No dizziness. Appetite is fair. No abdominal pain, diarrhea or constipation. No fever or cough. She states she is planning to move to [facility] after her TCU stay. She previously was in independent town home. No new vision or hearing problems.       Past Medical History:  Past Medical History:   Diagnosis Date     DNR (do not resuscitate) / DNI      HTN (hypertension)      Nonsmoker      Osteoporosis     Completed 5 years of alendronate therapy.           Surgical History:  No past surgical history on file.    Family History:   Family History   Problem Relation  Age of Onset     Stroke Mother      Leukemia Father        Social History:    Social History     Socioeconomic History     Marital status:      Spouse name: Not on file     Number of children: 0     Years of education: Not on file     Highest education level: Not on file   Occupational History     Occupation: Teacher,      Employer: RETIRED   Social Needs     Financial resource strain: Not on file     Food insecurity:     Worry: Not on file     Inability: Not on file     Transportation needs:     Medical: Not on file     Non-medical: Not on file   Tobacco Use     Smoking status: Never Smoker     Smokeless tobacco: Never Used   Substance and Sexual Activity     Alcohol use: No     Drug use: No     Sexual activity: Not on file   Lifestyle     Physical activity:     Days per week: Not on file     Minutes per session: Not on file     Stress: Not on file   Relationships     Social connections:     Talks on phone: Not on file     Gets together: Not on file     Attends Druze service: Not on file     Active member of club or organization: Not on file     Attends meetings of clubs or organizations: Not on file     Relationship status: Not on file     Intimate partner violence:     Fear of current or ex partner: Not on file     Emotionally abused: Not on file     Physically abused: Not on file     Forced sexual activity: Not on file   Other Topics Concern     Not on file   Social History Narrative    Former patient of Dr. Damaris Billings.        No children.  Former .        POA for medical decisions is Araecli Villarreal.        Medications:  Current Outpatient Medications   Medication Sig     acetaminophen (TYLENOL) 325 MG tablet Take 2 tablets (650 mg total) by mouth every 6 (six) hours as needed.     furosemide (LASIX) 40 MG tablet Take 1 tablet (40 mg total) by mouth daily. (Patient taking differently: Take 40 mg by mouth daily.       )     lisinopril (PRINIVIL,ZESTRIL) 5 MG tablet  Take 1 tablet (5 mg total) by mouth daily. (Patient taking differently: Take 5 mg by mouth daily.       )     magnesium chloride (SLOW-MAG) 64 mg TbEC delayed-release tablet Take 1 tablet (64 mg total) by mouth daily. (Patient taking differently: Take 64 mg by mouth daily.       )     metoprolol succinate (TOPROL-XL) 100 MG 24 hr tablet Take 1 tablet (100 mg total) by mouth daily. (Patient taking differently: Take 100 mg by mouth daily.       )     omeprazole (PRILOSEC) 20 MG capsule Take 1 capsule (20 mg total) by mouth 2 (two) times a day before meals. (Patient taking differently: Take 20 mg by mouth 2 (two) times a day before meals.       )     potassium chloride (K-DUR,KLOR-CON) 20 MEQ tablet Take 1 tablet (20 mEq total) by mouth daily. (Patient taking differently: Take 20 mEq by mouth daily.       )     tetrahydrozoline (VISINE) 0.05 % ophthalmic solution Apply 1 drop to eye at bedtime as needed.       Allergies:  Allergies   Allergen Reactions     Penicillins Swelling       Review of Systems:  Pertinent items are noted in HPI.      Physical Exam:   General: Patient is alert elderly pleasant female, no distress.    Vitals: /75, Temp 97.8, Pulse 76, RR 16, O2 sat 96% RA.  HEENT: Head is NCAT. Eyes show no injection or icterus. Nares negative. Oropharynx well hydrated.  Neck: Supple. No tenderness or adenopathy. No JVD.  Lungs: Clear bilaterally. No wheezes.  Cardiovascular: irregular with murmur noted..  Back: No spinal or CVA tenderness.  Abdomen: Soft, no tenderness on exam. Bowel sounds present. No guarding rebound or rigidity.  : Deferred.  Extremities: Puffy feet left more than right.  Musculoskeletal: Age related degen changes.   Skin: No rashes.   Psych: Mood appears good.      Labs:  Results for orders placed or performed during the hospital encounter of 03/14/19   Basic Metabolic Panel   Result Value Ref Range    Sodium 141 136 - 145 mmol/L    Potassium 4.0 3.5 - 5.0 mmol/L    Chloride 104 98 -  107 mmol/L    CO2 29 22 - 31 mmol/L    Anion Gap, Calculation 8 5 - 18 mmol/L    Glucose 88 70 - 125 mg/dL    Calcium 8.4 (L) 8.5 - 10.5 mg/dL    BUN 35 (H) 8 - 28 mg/dL    Creatinine 0.91 0.60 - 1.10 mg/dL    GFR MDRD Af Amer >60 >60 mL/min/1.73m2    GFR MDRD Non Af Amer 58 (L) >60 mL/min/1.73m2       Lab Results   Component Value Date    WBC 9.2 03/16/2019    HGB 9.9 (L) 03/16/2019    HCT 31.4 (L) 03/16/2019    MCV 96 03/16/2019     03/16/2019         Assessment/Plan:  1. Weakness. Here for therapy, strengthening.   2. HTN. On lisinopril, metoprolol. Monitor BPs.  3. CHF/CM. Cont medical management, diuretics. Monitor weights, edema, clinical status. Follow up with cardiology.   4. Afib. New diagnosis. On metoprolol. No anticoagulation secondary to recent GIB.  5. GIB. Recheck hgb at 9.9.  6. Recent UTI, sepsis.  No current concerns. Observe for sx.  7. Code status is DNR.        Total time greater than 45 minutes, greater than 50% counseling and coordination of care, time spent in interview and examination of patient, review of records, discussion with nursing staff. CC includes coordination with care team and SW for disposition plans, likely moving to long term after TCU. Discussion with patient regarding new meds for afib, risks and benefits of anticoagulation given recent GIB. Importance of monitoring hgb to ensure stability.         Electronically signed by: Lora Hernandez MD

## 2021-06-25 NOTE — PROGRESS NOTES
Progress Notes by Douglas Sosa MD at 5/12/2017  8:50 AM     Author: Douglas Sosa MD Service: -- Author Type: Physician    Filed: 5/13/2017  9:35 PM Encounter Date: 5/12/2017 Status: Signed    : Douglas Sosa MD (Physician)              [facility]    Comprehensive and complex medical care - Chronic disease management - Shared decision making - Care coordination - Compassionate care    Patient advocacy - Rational deprescribing - Minimally disruptive medicine - Ethical focus - Customized care    Date of Service: 5/12/2017  Primary Provider: Douglas Sosa MD    Patient Care Team:  Douglas Sosa MD as PCP - General (Internal Medicine)     ______________________________________________________________________     Patient's Pharmacy:    [facility] - [facility] [address]  [address] [address]  Phone: [phone] Fax: [phone]     Patient's Insurance:    Payor: [employer] / Plan: [employer] MEDICARE ADVANTAGE PLAN / Product Type: MEDICARE ADVANTAGE /     ______________________________________________________________________      Routine physical - female, age 65 and older.    Pinky Duggan is a 89 y.o. female coming in today for a routine physical.  She does not have other issues outside the physical to discuss as well.    Past Medical History:   Diagnosis Date   ? DNR (do not resuscitate) / DNI    ? HTN (hypertension)    ? Nonsmoker    ? Osteoporosis     Completed 5 years of alendronate therapy.     Social History     Social History   ? Marital status:      Spouse name: N/A   ? Number of children: 0   ? Years of education: N/A     Occupational History   ? Teacher,  Retired     Social History Main Topics   ? Smoking status: Never Smoker   ? Smokeless tobacco: None   ? Alcohol use No   ? Drug use: None   ? Sexual activity: Not Asked     Other Topics Concern   ? None     Social  History Narrative    Former patient of Dr. Damaris Billings.        No children.  Former .        POA for medical decisions is Nephew Blake Villarreal.     Family History   Problem Relation Age of Onset   ? Stroke Mother    ? Leukemia Father      Family history is otherwise noncontributory.    Current Outpatient Prescriptions   Medication Sig   ? amLODIPine (NORVASC) 10 MG tablet TAKE 1 TABLET EVERY DAY (NEED MD APPOINTMENT)   ? hydroCHLOROthiazide (HYDRODIURIL) 12.5 MG tablet Take 1 tablet (12.5 mg total) by mouth daily. CORRECTION:  This is the correct dose.     Immunization History   Administered Date(s) Administered   ? Influenza [id number], 02/01/2010   ? Influenza high dose, seasonal 11/16/2010   ? Influenza, inj, historic 09/17/2009, 10/13/2011   ? Influenza, seasonal,quad inj 6-35 mos 10/16/2012   ? Pneumo Polysac 23-V 09/17/2009   ? Td, historic 09/17/2009     ______________________________________________________________________    History of present illness:    The patient comes in today for routine physical and general checkup.  She declines breast exam today.  She denies/declines undressed skin exam today.    She is still living at home.  She has neighbors who help her out.    We reviewed her high blood pressure.  She does run a lower blood pressure.  She would like to reduce her blood pressure medication if able.  She has not had a constant dizziness or lightheadedness.  She has had no issues with chest pain.    Reviewed her osteoporosis and this has been treated in the past.  No further follow-up is desired.    Review Zostavax and Prevnar vaccines which she declines.    CODE STATUS and she does want to be DNR/DNI CODE STATUS.  Her power of  is her nephew Blake Villarreal.     10 point review of systems are per the health history form.  ______________________________________________________________________     Exam:  Wt Readings from Last 3 Encounters:   05/12/17 (!) 98 lb 12.8 oz (44.8 kg)  "  07/21/15 102 lb (46.3 kg)     BP Readings from Last 3 Encounters:   05/12/17 118/60   07/21/15 142/66     /60  Pulse 92  Ht 5' 2\" (1.575 m)  Wt (!) 98 lb 12.8 oz (44.8 kg)  BMI 18.07 kg/m2    The patient is in no acute distress.  Mood good.  Insight good .  No skin lesions or nodules of concern.  Ears are clear.  Nose is clear.  Throat is clear.  Neck is supple  and there is no thyromegaly. No cervical, epitrochlear or axillary adenopathy.  Heart regular rate and rhythm.  There is no murmur present.  Lungs clear to auscultation bilaterally.  Respiratory effort is good.  Breast exam declined.  Abdomen is soft, nontender.  No hepatosplenomegaly.  No hernia appreciated.  Edema - none. Distal pulses strong.  Neuro exam shows normal strength in all muscle groups and normal reflexes.     ______________________________________________________________________    Diagnostics:    Results for orders placed or performed in visit on 05/12/17   Basic Metabolic Panel   Result Value Ref Range    Sodium 141 136 - 145 mmol/L    Potassium 3.9 3.5 - 5.0 mmol/L    Chloride 101 98 - 107 mmol/L    CO2 28 22 - 31 mmol/L    Anion Gap, Calculation 12 5 - 18 mmol/L    Glucose 78 70 - 125 mg/dL    Calcium 10.1 8.5 - 10.5 mg/dL    BUN 18 8 - 28 mg/dL    Creatinine 0.87 0.60 - 1.10 mg/dL    GFR MDRD Af Amer >60 >60 mL/min/1.73m2    GFR MDRD Non Af Amer >60 >60 mL/min/1.73m2   HM2(CBC w/o Differential)   Result Value Ref Range    WBC 8.1 4.0 - 11.0 thou/uL    RBC 4.51 3.80 - 5.40 mill/uL    Hemoglobin 13.9 12.0 - 16.0 g/dL    Hematocrit 41.5 35.0 - 47.0 %    MCV 92 80 - 100 fL    MCH 30.8 27.0 - 34.0 pg    MCHC 33.4 32.0 - 36.0 g/dL    RDW 11.8 11.0 - 14.5 %    Platelets 193 140 - 440 thou/uL    MPV 8.3 7.0 - 10.0 fL      ______________________________________________________________________     Assessment:    1. Routine physical examination    2. HTN (hypertension)    3. Osteoporosis    4. Nonsmoker    5. DNR (do not resuscitate) / " DNI         ______________________________________________________________________     ______________________________________________________________________    QUALITY REVIEW      Health Maintenance Due   Topic Date Due   ? FALL RISK ASSESSMENT  05/15/1989       History   Smoking Status   ? Never Smoker   Smokeless Tobacco   ? Not on file        PHQ-2 Total Score: 0 (5/12/2017  8:00 AM)  No Data Recorded     ______________________________________________________________________       Plan:    1. Reduce HCTZ to 12.5 mg a day.  2. Consider reducing BP meds in the future if able.    3. Check blood work today.   4. DNR/DNI.    Douglas Sosa MD  General Internal Medicine  [facility]    Return in about 1 year (around 5/12/2018).

## 2021-06-25 NOTE — TELEPHONE ENCOUNTER
Status Update  Who is Calling: Home Care nurse  Update: States patient has been very weak since she had gotten home yesterday from [facility]. Nurse states she was in today with patient and noted that the patient did not take her medications or eat anything due to patient told her she was weak and very tired. Patient decided to go to [facility] for help as she can not do things for herself at home alone. Patient did have friends take her to the hospital.   Patients insurance denied patient's request to go to a TCU, so that is why she went home alone.  Okay to leave a detailed message?:  Yes, if provider would like.

## 2021-06-25 NOTE — TELEPHONE ENCOUNTER
Patient in the hospital at this time.  I agree with this.  Likely will need TCU placement but apparently insurance is short sighted and did not initially approve this in the patient's hospital stay previous.

## 2021-06-25 NOTE — PROGRESS NOTES
[facility]    Facility:   [facility] [[id number]]   Code Status: DNR      CHIEF COMPLAINT/REASON FOR VISIT:  Chief Complaint   Patient presents with     Review Of Multiple Medical Conditions       HISTORY:      HPI: Pinky is a 89 y.o. female undergoing physical and occupational therapy at [facility].  She is with history of hypertension and daily aspirin use, atrial fibrillation, dilated cardiomyopathy.  She presented to the hospital with 7 days of poor appetite, 24 hours of melena, weakness and shortness of breath.  She was found to have a hemoglobin of 10.8 and evidence of sepsis with new A. fib with RVR.  Per the records she did discharged from [facility] the day before and was declined a TCU by her insurance at that time    Today she is seen as a routine first visit to review multiple medical issues.  She denies chest pain or shortness of breath.  She tells me her melena stopped prior to leaving the hospital.  She reports no pain and has never had any pain.  She was also seen today for a low blood pressure yesterday of 98/70, today she is 118/64 she denies any dizziness.  She does have severe scoliosis.    Past Medical History:   Diagnosis Date     DNR (do not resuscitate) / DNI      HTN (hypertension)      Nonsmoker      Osteoporosis     Completed 5 years of alendronate therapy.             Family History   Problem Relation Age of Onset     Stroke Mother      Leukemia Father      Social History     Socioeconomic History     Marital status:      Spouse name: Not on file     Number of children: 0     Years of education: Not on file     Highest education level: Not on file   Occupational History     Occupation: Teacher,      Employer: RETIRED   Social Needs     Financial resource strain: Not on file     Food insecurity:     Worry: Not on file     Inability: Not on file     Transportation needs:     Medical: Not on file      Non-medical: Not on file   Tobacco Use     Smoking status: Never Smoker     Smokeless tobacco: Never Used   Substance and Sexual Activity     Alcohol use: No     Drug use: No     Sexual activity: Not on file   Lifestyle     Physical activity:     Days per week: Not on file     Minutes per session: Not on file     Stress: Not on file   Relationships     Social connections:     Talks on phone: Not on file     Gets together: Not on file     Attends [employer] service: Not on file     Active member of club or organization: Not on file     Attends meetings of clubs or organizations: Not on file     Relationship status: Not on file     Intimate partner violence:     Fear of current or ex partner: Not on file     Emotionally abused: Not on file     Physically abused: Not on file     Forced sexual activity: Not on file   Other Topics Concern     Not on file   Social History Narrative    Former patient of Dr. Damaris Billings.        No children.  Former .        POA for medical decisions is Araceli Villarreal.         Review of Systems   Constitutional: Positive for fatigue. Negative for activity change, appetite change and fever.   HENT: Negative for congestion.    Respiratory: Negative for cough, shortness of breath and wheezing.    Cardiovascular: Negative for chest pain and leg swelling.   Gastrointestinal: Negative for abdominal distention, abdominal pain, constipation, diarrhea and nausea.   Genitourinary: Negative for dysuria.   Musculoskeletal: Negative for arthralgias and back pain.        Severe scoliosis   Skin: Negative for color change and wound.   Neurological: Negative for dizziness.   Psychiatric/Behavioral: Negative for agitation, behavioral problems and confusion.       .  Vitals:    03/18/19 1309   BP: 118/64   Pulse: 80   Resp: 16   Temp: 98.7  F (37.1  C)   SpO2: 97%   Weight: (!) 96 lb 12.8 oz (43.9 kg)       Physical Exam   Constitutional: She is oriented to person, place, and time. She  appears well-developed and well-nourished.   Pleasant woman in no acute distress   HENT:   Head: Normocephalic and atraumatic.   Eyes: Conjunctivae are normal. Pupils are equal, round, and reactive to light.   Neck: Normal range of motion. Neck supple.   Cardiovascular: Normal rate, regular rhythm and normal heart sounds.   No murmur heard.  Pulmonary/Chest: Effort normal and breath sounds normal. She has no wheezes. She has no rales.   Abdominal: Soft. Bowel sounds are normal. She exhibits no distension. There is no tenderness.   Musculoskeletal: Normal range of motion. She exhibits no edema.   Neurological: She is alert and oriented to person, place, and time.   Skin: Skin is warm and dry.   Psychiatric: She has a normal mood and affect. Her behavior is normal.         LABS:   Recent Results (from the past 240 hour(s))   ECG 12 lead nursing unit performed   Result Value Ref Range    SYSTOLIC BLOOD PRESSURE 121 mmHg    DIASTOLIC BLOOD PRESSURE 59 mmHg    VENTRICULAR RATE 123 BPM    ATRIAL RATE 136 BPM    P-R INTERVAL  ms    QRS DURATION 148 ms    Q-T INTERVAL 356 ms    QTC CALCULATION (BEZET) 509 ms    P Axis  degrees    R AXIS 41 degrees    T AXIS 147 degrees    MUSE DIAGNOSIS       Atrial fibrillation with rapid ventricular response with premature ventricular or aberrantly conducted complexes  Left bundle branch block  Abnormal ECG  When compared with ECG of 02-FEB-2012 09:35,  Atrial fibrillation has replaced Sinus rhythm  Left bundle branch block is now Present  Minimal criteria for Septal infarct are no longer Present  Confirmed by FREIDA THIBODEAUX MD LOC:[facility] ([id number]) on 3/9/2019 1:01:01 PM     Blood culture from PERIPHERAL SITE   Result Value Ref Range    Anaerobic Blood Culture Bottle No Growth No Growth, No organisms seen, bottle returned to instrument, Specimen not received    Aerobic Blood Culture Bottle No Growth No Growth, No organisms seen, bottle returned to instrument, Specimen not received   Blood  Culture from PERIPHERAL SITE (2nd one)   Result Value Ref Range    Anaerobic Blood Culture Bottle No Growth No Growth, No organisms seen, bottle returned to instrument, Specimen not received    Aerobic Blood Culture Bottle No Growth No Growth, No organisms seen, bottle returned to instrument, Specimen not received   Basic Metabolic Panel   Result Value Ref Range    Sodium 136 136 - 145 mmol/L    Potassium 3.2 (L) 3.5 - 5.0 mmol/L    Chloride 100 98 - 107 mmol/L    CO2 22 22 - 31 mmol/L    Anion Gap, Calculation 14 5 - 18 mmol/L    Glucose 104 70 - 125 mg/dL    Calcium 9.4 8.5 - 10.5 mg/dL    BUN 66 (H) 8 - 28 mg/dL    Creatinine 0.95 0.60 - 1.10 mg/dL    GFR MDRD Af Amer >60 >60 mL/min/1.73m2    GFR MDRD Non Af Amer 55 (L) >60 mL/min/1.73m2   Hepatic Profile   Result Value Ref Range    Bilirubin, Total 0.7 0.0 - 1.0 mg/dL    Bilirubin, Direct 0.3 <=0.5 mg/dL    Protein, Total 7.4 6.0 - 8.0 g/dL    Albumin 2.9 (L) 3.5 - 5.0 g/dL    Alkaline Phosphatase 75 45 - 120 U/L    AST 23 0 - 40 U/L    ALT 22 0 - 45 U/L   Lactic Acid   Result Value Ref Range    Lactic Acid 1.7 0.5 - 2.2 mmol/L   Lipase   Result Value Ref Range    Lipase 15 0 - 52 U/L   INR   Result Value Ref Range    INR 1.19 (H) 0.90 - 1.10   Troponin I   Result Value Ref Range    Troponin I 0.11 0.00 - 0.29 ng/mL   Magnesium   Result Value Ref Range    Magnesium 2.0 1.8 - 2.6 mg/dL   BNP(B-type Natriuretic Peptide)   Result Value Ref Range    BNP 1,935 (H) 0 - 167 pg/mL   HM1 (CBC with Diff)   Result Value Ref Range    WBC 20.6 (H) 4.0 - 11.0 thou/uL    RBC 3.52 (L) 3.80 - 5.40 mill/uL    Hemoglobin 10.8 (L) 12.0 - 16.0 g/dL    Hematocrit 33.0 (L) 35.0 - 47.0 %    MCV 94 80 - 100 fL    MCH 30.7 27.0 - 34.0 pg    MCHC 32.7 32.0 - 36.0 g/dL    RDW 12.7 11.0 - 14.5 %    Platelets 214 140 - 440 thou/uL    MPV 11.7 8.5 - 12.5 fL    Neutrophils % 91 (H) 50 - 70 %    Lymphocytes % 3 (L) 20 - 40 %    Monocytes % 6 2 - 10 %    Eosinophils % 0 0 - 6 %    Basophils % 0  0 - 2 %    Neutrophils Absolute 18.6 (H) 2.0 - 7.7 thou/uL    Lymphocytes Absolute 0.6 (L) 0.8 - 4.4 thou/uL    Monocytes Absolute 1.2 (H) 0.0 - 0.9 thou/uL    Eosinophils Absolute 0.0 0.0 - 0.4 thou/uL    Basophils Absolute 0.0 0.0 - 0.2 thou/uL   Type and Screen   Result Value Ref Range    ABORh O POS     Antibody Screen Negative Negative   Urinalysis   Result Value Ref Range    Color, UA Yellow Colorless, Yellow, Straw, Light Yellow    Clarity, UA Cloudy (!) Clear    Glucose, UA Negative Negative    Bilirubin, UA Negative Negative    Ketones, UA Trace (!) Negative    Specific Gravity, UA 1.016 1.001 - 1.030    Blood, UA Large (!) Negative    pH, UA 5.5 4.5 - 8.0    Protein, UA 30 mg/dL (!) Negative mg/dL    Urobilinogen, UA <2.0 E.U./dL <2.0 E.U./dL, 2.0 E.U./dL    Nitrite, UA Positive (!) Negative    Leukocytes, UA Large (!) Negative    Bacteria, UA Many (!) None Seen hpf    RBC, UA 5-10 (!) None Seen, 0-2 hpf    WBC, UA 10-25 (!) None Seen, 0-5 hpf    Squam Epithel, UA 5-10 (!) None Seen, 0-5 lpf   Culture, Urine   Result Value Ref Range    Culture >100,000 col/ml Escherichia coli (!)        Susceptibility    Escherichia coli - GINNY     Amikacin <=8 Sensitive      Amoxicillin + Clavulanate <=4/2 Sensitive      Ampicillin <=4 Sensitive      Cefazolin 8 Sensitive      Cefepime <=1 Sensitive      Ceftriaxone <=1 Sensitive      Ciprofloxacin <=0.5 Sensitive      Gentamicin >8 Resistant      Imipenem <=0.25 Sensitive      Levofloxacin <=1 Sensitive      Meropenem <=0.25 Sensitive      Nitrofurantoin <=16 Sensitive      Tetracycline >8 Resistant      Tobramycin 8 Intermediate      Trimethoprim + Sulfamethoxazole <=0.5 Sensitive    POCT occult blood stool   Result Value Ref Range    POC Fecal Occult Bld Positive (!) Negative    Lot Number [id number]     Expiration Date 2/21     Diluent/Developer Lot Number [id number]     Diluent/Developer Expiration Date 11/21     Pos Control Valid Control Valid Control    Neg Control Valid  Control Valid Control   Hemoglobin   Result Value Ref Range    Hemoglobin 11.2 (L) 12.0 - 16.0 g/dL   Hemogram with PLT   Result Value Ref Range    WBC 21.6 (H) 4.0 - 11.0 thou/uL    RBC 3.28 (L) 3.80 - 5.40 mill/uL    Hemoglobin 10.0 (L) 12.0 - 16.0 g/dL    Hematocrit 31.4 (L) 35.0 - 47.0 %    MCV 96 80 - 100 fL    MCH 30.5 27.0 - 34.0 pg    MCHC 31.8 (L) 32.0 - 36.0 g/dL    RDW 13.1 11.0 - 14.5 %    Platelets 228 140 - 440 thou/uL    MPV 11.9 8.5 - 12.5 fL   Troponin I   Result Value Ref Range    Troponin I 0.19 0.00 - 0.29 ng/mL   Basic Metabolic Panel   Result Value Ref Range    Sodium 138 136 - 145 mmol/L    Potassium 4.0 3.5 - 5.0 mmol/L    Chloride 110 (H) 98 - 107 mmol/L    CO2 18 (L) 22 - 31 mmol/L    Anion Gap, Calculation 10 5 - 18 mmol/L    Glucose 105 70 - 125 mg/dL    Calcium 8.0 (L) 8.5 - 10.5 mg/dL    BUN 47 (H) 8 - 28 mg/dL    Creatinine 0.81 0.60 - 1.10 mg/dL    GFR MDRD Af Amer >60 >60 mL/min/1.73m2    GFR MDRD Non Af Amer >60 >60 mL/min/1.73m2   TSH   Result Value Ref Range    TSH 2.51 0.30 - 5.00 uIU/mL   Magnesium   Result Value Ref Range    Magnesium 1.7 (L) 1.8 - 2.6 mg/dL   Culture, Stool   Result Value Ref Range    Culture       No Salmonella, Shigella, Yersinia, or Campylobacter.   C. Diff Toxin By PCR   Result Value Ref Range    C.Difficile Toxigenic by PCR Negative Negative    Ribotype 027/NAP1/B1 Presumptive Negative Presumptive Negative   EnteroHaemorrhagic E. coli Work Up   Result Value Ref Range    Shiga Toxin 1 Negative Negative    Shiga Toxin 2 Negative Negative   Echo Complete   Result Value Ref Range    LV volume diastolic 163 (!) 46 - 106 cm3    LV volume systolic 131 (!) 14 - 42 cm3    IVSd 0.837 0.6 - 0.9 cm    LVIDd 6.06 (!) 3.8 - 5.2 cm    LVIDs 5.4 (!) 2.2 - 3.5 cm    LVOT diam 1.7 cm    LVOT mean gradient 1 mmHg    LVOT peak VTI 15.6 cm    LVOT mean jolly 49.2 cm/s    LVOT peak jolly 75 cm/s    LVOT peak gradient 2 mmHg    LV PWd 0.857 0.6 - 0.9 cm    MV E' lat jolly 3.81 cm/s     MV E' lat jolly 3.81 cm/s    MV E' med jolly 3.59 cm/s    MV E' med jolly 3.59 cm/s    AR decel slope 2,270 mm/s2    AR p 1/2 time 491 ms    AR peak jolly 307 cm/s    AV peak jolly 219 cm/s    AV mean jolly 160 cm/s    AV mean gradient 12 mmHg    AV VTI 49.5 cm    AO root 2.7 cm    LA size 4.7 cm    LA length 6.8 cm    MR mean velocity 353 cm/s    MR mean gradient 58 mmHg    MR  cm    MV mean jolly 86.3 cm/s    MV mean gradient 3 mmHg    MV VTI 48.2 cm    MR PISA peak jolly 504 cm/s    MV peak velocityoctiy 145 cm/s    MV decel slope 4,840 mm/s2    MV decel time 261 ms    MV P 1/2 time 96 ms    MV peak A jolly 130 cm/s    MV peak E jolly 153 cm/s    MR flow 56 cm3    TR peak jolly 296 cm/s    LA area 2 33.9 cm2    LA area 1 31.8 cm2    BSA 1.38 m2    Hieght 60 in    Weight 1,612.8 lbs    /60 mmHg    HR 94 bpm    IVS/PW ratio 1.0     MR peak gradent 101.6 mmHg    TR peak gradent 35.0 mmHg    LV FS 10.9 28 - 44 %    Echo LVEF calculated 20 55 - 75 %    LA volume 134.8 mL    LV mass 203.3 g    AV area 0.7 cm2    AV DIM IND jolly 0.3     MV area p 1/2 time 2.3 cm2    MV area cont eq 0.7 cm2    MV E/A Ratio 1.2     LVOT area 2.27 cm2    MR PISA VN Nyq 30.80 cm/s    MR PISA radius 1.0 cm    LVOT SV 35.4 cm3    AV peak gradient 19.2 mmHg    MV peak gradient 8.4 mmHg    MR PISA EROA 0.4 cm2    MR volume 56.4 cc    LV systolic volume index 94.9 8 - 24 cm3/m2    LV diastolic volume index 118.1 29 - 61 cm3/m2    LA volume index 97.6 mL/m2    LV mass index 147.3 g/m2    LV SVi 25.6 ml/m2    TAPSE 2.1 cm    LV CO 3.3 l/min    LV Ci 2.4 l/min/m2    Height 60.0 in    Weight 101 lbs    AR peak gradient 37.7 mmHg    AV DIM IND VTI 0.3     MVA VTI 0.73 cm2   Hemoglobin   Result Value Ref Range    Hemoglobin 10.2 (L) 12.0 - 16.0 g/dL   Potassium - Next AM   Result Value Ref Range    Potassium 3.5 3.5 - 5.0 mmol/L   HM2(CBC w/o Differential)   Result Value Ref Range    WBC 15.8 (H) 4.0 - 11.0 thou/uL    RBC 3.04 (L) 3.80 - 5.40 mill/uL     Hemoglobin 9.2 (L) 12.0 - 16.0 g/dL    Hematocrit 29.5 (L) 35.0 - 47.0 %    MCV 97 80 - 100 fL    MCH 30.3 27.0 - 34.0 pg    MCHC 31.2 (L) 32.0 - 36.0 g/dL    RDW 13.2 11.0 - 14.5 %    Platelets 210 140 - 440 thou/uL    MPV 11.4 8.5 - 12.5 fL   Potassium   Result Value Ref Range    Potassium 3.8 3.5 - 5.0 mmol/L   Hemoglobin   Result Value Ref Range    Hemoglobin 9.9 (L) 12.0 - 16.0 g/dL   Magnesium   Result Value Ref Range    Magnesium 1.5 (L) 1.8 - 2.6 mg/dL   HM2(CBC w/o Differential)   Result Value Ref Range    WBC 14.9 (H) 4.0 - 11.0 thou/uL    RBC 3.33 (L) 3.80 - 5.40 mill/uL    Hemoglobin 10.1 (L) 12.0 - 16.0 g/dL    Hematocrit 31.9 (L) 35.0 - 47.0 %    MCV 96 80 - 100 fL    MCH 30.3 27.0 - 34.0 pg    MCHC 31.7 (L) 32.0 - 36.0 g/dL    RDW 13.2 11.0 - 14.5 %    Platelets 257 140 - 440 thou/uL    MPV 11.2 8.5 - 12.5 fL   Basic metabolic panel   Result Value Ref Range    Sodium 142 136 - 145 mmol/L    Potassium 3.4 (L) 3.5 - 5.0 mmol/L    Chloride 108 (H) 98 - 107 mmol/L    CO2 25 22 - 31 mmol/L    Anion Gap, Calculation 9 5 - 18 mmol/L    Glucose 97 70 - 125 mg/dL    Calcium 8.6 8.5 - 10.5 mg/dL    BUN 41 (H) 8 - 28 mg/dL    Creatinine 0.93 0.60 - 1.10 mg/dL    GFR MDRD Af Amer >60 >60 mL/min/1.73m2    GFR MDRD Non Af Amer 56 (L) >60 mL/min/1.73m2   Potassium   Result Value Ref Range    Potassium 3.4 (L) 3.5 - 5.0 mmol/L   Potassium   Result Value Ref Range    Potassium 3.6 3.5 - 5.0 mmol/L   Crossmatch   Result Value Ref Range    Crossmatch Compatible     Blood Expiration Date [id number]     Unit Type O Pos     Unit Number [id number]     Status Released     Component Red Blood Cells     PRODUCT CODE [id number]     Blood Type 5100     CODING SYSTEM [id number]    Magnesium   Result Value Ref Range    Magnesium 1.7 (L) 1.8 - 2.6 mg/dL   Basic Metabolic Panel   Result Value Ref Range    Sodium 143 136 - 145 mmol/L    Potassium 3.9 3.5 - 5.0 mmol/L    Chloride 108 (H) 98 - 107 mmol/L    CO2 29 22 - 31 mmol/L     Anion Gap, Calculation 6 5 - 18 mmol/L    Glucose 91 70 - 125 mg/dL    Calcium 8.7 8.5 - 10.5 mg/dL    BUN 36 (H) 8 - 28 mg/dL    Creatinine 0.85 0.60 - 1.10 mg/dL    GFR MDRD Af Amer >60 >60 mL/min/1.73m2    GFR MDRD Non Af Amer >60 >60 mL/min/1.73m2   Hemoglobin   Result Value Ref Range    Hemoglobin 9.9 (L) 12.0 - 16.0 g/dL   ECG 12 lead   Result Value Ref Range    SYSTOLIC BLOOD PRESSURE  mmHg    DIASTOLIC BLOOD PRESSURE  mmHg    VENTRICULAR RATE 88 BPM    ATRIAL RATE 88 BPM    P-R INTERVAL 160 ms    QRS DURATION 118 ms    Q-T INTERVAL 386 ms    QTC CALCULATION (BEZET) 467 ms    P Axis 52 degrees    R AXIS 20 degrees    T AXIS 129 degrees    MUSE DIAGNOSIS       Normal sinus rhythm  Left ventricular hypertrophy with QRS widening and repolarization abnormality  Abnormal ECG  When compared with ECG of 08-MAR-2019 14:31,  Sinus rhythm has replaced Atrial fibrillation  Left bundle branch block is no longer Present  Confirmed by AUDREY SHERWOOD, CHADD LOC:[facility] ([id number]) on 3/12/2019 4:03:00 PM     Potassium - Next AM   Result Value Ref Range    Potassium 4.2 3.5 - 5.0 mmol/L   Basic Metabolic Panel   Result Value Ref Range    Sodium 142 136 - 145 mmol/L    Potassium 4.0 3.5 - 5.0 mmol/L    Chloride 105 98 - 107 mmol/L    CO2 30 22 - 31 mmol/L    Anion Gap, Calculation 7 5 - 18 mmol/L    Glucose 91 70 - 125 mg/dL    Calcium 8.6 8.5 - 10.5 mg/dL    BUN 40 (H) 8 - 28 mg/dL    Creatinine 0.98 0.60 - 1.10 mg/dL    GFR MDRD Af Amer >60 >60 mL/min/1.73m2    GFR MDRD Non Af Amer 53 (L) >60 mL/min/1.73m2   Magnesium   Result Value Ref Range    Magnesium 1.8 1.8 - 2.6 mg/dL   HM2(CBC w/o Differential)   Result Value Ref Range    WBC 10.0 4.0 - 11.0 thou/uL    RBC 3.20 (L) 3.80 - 5.40 mill/uL    Hemoglobin 9.9 (L) 12.0 - 16.0 g/dL    Hematocrit 29.9 (L) 35.0 - 47.0 %    MCV 93 80 - 100 fL    MCH 30.9 27.0 - 34.0 pg    MCHC 33.1 32.0 - 36.0 g/dL    RDW 13.2 11.0 - 14.5 %    Platelets 347 140 - 440 thou/uL    MPV 10.2 8.5 - 12.5 fL    Procalcitonin   Result Value Ref Range    Procalcitonin 0.13 0.00 - 0.49 ng/mL   HM2(CBC w/o Differential)   Result Value Ref Range    WBC 9.2 4.0 - 11.0 thou/uL    RBC 3.27 (L) 3.80 - 5.40 mill/uL    Hemoglobin 9.9 (L) 12.0 - 16.0 g/dL    Hematocrit 31.4 (L) 35.0 - 47.0 %    MCV 96 80 - 100 fL    MCH 30.3 27.0 - 34.0 pg    MCHC 31.5 (L) 32.0 - 36.0 g/dL    RDW 13.3 11.0 - 14.5 %    Platelets 321 140 - 440 thou/uL    MPV 10.2 8.5 - 12.5 fL   Basic Metabolic Panel   Result Value Ref Range    Sodium 141 136 - 145 mmol/L    Potassium 4.0 3.5 - 5.0 mmol/L    Chloride 104 98 - 107 mmol/L    CO2 29 22 - 31 mmol/L    Anion Gap, Calculation 8 5 - 18 mmol/L    Glucose 88 70 - 125 mg/dL    Calcium 8.4 (L) 8.5 - 10.5 mg/dL    BUN 35 (H) 8 - 28 mg/dL    Creatinine 0.91 0.60 - 1.10 mg/dL    GFR MDRD Af Amer >60 >60 mL/min/1.73m2    GFR MDRD Non Af Amer 58 (L) >60 mL/min/1.73m2     Current Outpatient Medications   Medication Sig     acetaminophen (TYLENOL) 325 MG tablet Take 2 tablets (650 mg total) by mouth every 6 (six) hours as needed.     furosemide (LASIX) 40 MG tablet Take 1 tablet (40 mg total) by mouth daily. (Patient taking differently: Take 40 mg by mouth daily.       )     lisinopril (PRINIVIL,ZESTRIL) 5 MG tablet Take 1 tablet (5 mg total) by mouth daily. (Patient taking differently: Take 5 mg by mouth daily.       )     magnesium chloride (SLOW-MAG) 64 mg TbEC delayed-release tablet Take 1 tablet (64 mg total) by mouth daily. (Patient taking differently: Take 64 mg by mouth daily.       )     metoprolol succinate (TOPROL-XL) 100 MG 24 hr tablet Take 1 tablet (100 mg total) by mouth daily. (Patient taking differently: Take 100 mg by mouth daily.       )     omeprazole (PRILOSEC) 20 MG capsule Take 1 capsule (20 mg total) by mouth 2 (two) times a day before meals. (Patient taking differently: Take 20 mg by mouth 2 (two) times a day before meals.       )     potassium chloride (K-DUR,KLOR-CON) 20 MEQ tablet Take 1  tablet (20 mEq total) by mouth daily. (Patient taking differently: Take 20 mEq by mouth daily.       )     tetrahydrozoline (VISINE) 0.05 % ophthalmic solution Apply 1 drop to eye at bedtime as needed.     ASSESSMENT:      ICD-10-CM    1. Secondary hypertension I15.9    2. Melena K92.1    3. Paroxysmal atrial fibrillation (H) I48.0    4. Weakness R53.1        PLAN:    Deconditioning continue PT OT  Hypertension blood pressure softer, continue Lasix lisinopril and metoprolol with hold parameters.  Atrial fibrillation currently on metoprolol   Melena-currently resolved, hemoglobin stable continue Prilosec indefinitely, per the records GI seen previously and endoscopy should be deferred indefinitely and avoid aspirin indefinitely  Atrial fibrillation continue metoprolol and Lasix  Hypertension her home medications amlodipine and HCTZ were stopped her discharge and she was started on lisinopril and Lasix and also continue her metoprolol  Sepsis resolved per the records  likely urinary source. completed 5 days of Levaquin    Electronically signed by: Mili Hylton CNP

## 2021-06-26 NOTE — PROGRESS NOTES
Progress Notes by Douglas Sosa MD at 6/5/2018 11:20 AM     Author: Douglas Sosa MD Service: -- Author Type: Physician    Filed: 6/5/2018 10:49 PM Encounter Date: 6/5/2018 Status: Signed    : Douglas Sosa MD (Physician)              [facility]    Comprehensive and complex medical care - Chronic disease management - Shared decision making - Care coordination - Compassionate care    Patient advocacy - Rational deprescribing - Minimally disruptive medicine - Ethical focus - Customized care    ______________________________________________________________________     Date of Service: 6/5/2018  Primary Provider: Douglas Sosa MD    Patient Care Team:  Douglas Sosa MD as PCP - General (Internal Medicine)     ______________________________________________________________________     Patient's Pharmacy:    [facility] - [place], [place] - [address]  [address] [facility] [address] [address]  Phone: [phone] Fax: [phone]     Patient's Contacts:  Name Home Phone Work Phone Mobile Phone Relationship Lg LEA Cat   [phone] Friend      Patient's Insurance:    Payor: HUMANA / Plan: HUMANA MEDICARE ADVANTAGE PLAN / Product Type: MEDICARE ADVANTAGE /     ______________________________________________________________________     Pinky Duggan is 89 y.o. female who comes in today for:    Chief Complaint   Patient presents with   ? Follow-up     Blood pressure and osteoporosis        Patient Active Problem List   Diagnosis   ? HTN (hypertension)   ? Osteoporosis   ? Nonsmoker   ? DNR (do not resuscitate) / DNI     Current Outpatient Prescriptions   Medication Sig   ? amLODIPine (NORVASC) 10 MG tablet TAKE 1 TABLET EVERY DAY (NEEDS TO BE SEEN)   ? aspirin 81 MG EC tablet Take 81 mg by mouth daily.   ? hydroCHLOROthiazide (HYDRODIURIL) 12.5 MG tablet TAKE 1 TABLET EVERY DAY     Social History     Social History  Narrative    Former patient of Dr. Damaris Billings.        No children.  Former .        POA for medical decisions is Nephaditya Blake Villarreal.     ______________________________________________________________________     History of present illness:    Reviewed her hypertension today.  Blood pressure has been in the goal range.  Denies any excessive dizziness from the medication with this.  She is taking her medication regularly.    Reviewed the patient's osteoporosis and there are no current issues here.    She has a heart murmur likely from the aortic valve but has not had any issues here.  Specifically, no chest pain, shortness of breath or syncope.    On review of systems, the patient denies any chest pain or shortness of breath.    ______________________________________________________________________    Exam:    Wt Readings from Last 3 Encounters:   06/05/18 99 lb 8 oz (45.1 kg)   05/12/17 (!) 98 lb 12.8 oz (44.8 kg)   07/21/15 102 lb (46.3 kg)     BP Readings from Last 3 Encounters:   06/05/18 128/72   05/12/17 118/60   07/21/15 142/66     /72 (Patient Site: Right Arm, Patient Position: Sitting, Cuff Size: Adult Regular)  Pulse 88  Wt 99 lb 8 oz (45.1 kg)  SpO2 97%  BMI 18.2 kg/m2   The patient is comfortable, no acute distress.  Mood good.  Insight good.  Eyes are nonicteric.  Neck is supple without mass.  No cervical adenopathy.  No thyromegaly. Heart regular rate and rhythm. There is a 3/6 systolic ejection quality murmur in the aortic position.  Lungs clear to auscultation bilaterally.  Respiratory effort is good.  Abdomen soft and nontender.  No hepatosplenomegaly.  Extremities no edema.  Gait is overall good.      ______________________________________________________________________    Diagnostics:    Results for orders placed or performed in visit on 06/05/18   Basic Metabolic Panel   Result Value Ref Range    Sodium 141 136 - 145 mmol/L    Potassium 3.9 3.5 - 5.0 mmol/L    Chloride  101 98 - 107 mmol/L    CO2 29 22 - 31 mmol/L    Anion Gap, Calculation 11 5 - 18 mmol/L    Glucose 91 70 - 125 mg/dL    Calcium 10.1 8.5 - 10.5 mg/dL    BUN 22 8 - 28 mg/dL    Creatinine 0.86 0.60 - 1.10 mg/dL    GFR MDRD Af Amer >60 >60 mL/min/1.73m2    GFR MDRD Non Af Amer >60 >60 mL/min/1.73m2      ______________________________________________________________________    Assessment:    1. HTN (hypertension)    2. Osteoporosis    3. Heart murmur      ______________________________________________________________________      PHQ-2 Total Score: 1 (6/5/2018 11:00 AM)  No Data Recorded  ______________________________________________________________________    Plan:    1. Blood work done today.  2. Follow-up again in 1 year if otherwise doing well.  3. Follow-up sooner if increasing symptoms related to her heart murmur.    Douglas Sosa MD  General Internal Medicine  [facility]     Return in about 1 year (around 6/5/2019), or if symptoms worsen or fail to improve.     No future appointments.      ______________________________________________________________________     Relevant ICD-10 codes and order associations:      ICD-10-CM    1. HTN (hypertension) I10 Basic Metabolic Panel   2. Osteoporosis M81.0    3. Heart murmur R01.1

## 2021-06-27 NOTE — PROGRESS NOTES
Progress Notes by Douglas Sosa MD at 4/12/2019 10:30 AM     Author: Douglas Sosa MD Service: -- Author Type: Physician    Filed: 4/14/2019  9:04 AM Encounter Date: 4/12/2019 Status: Signed    : Douglas Sosa MD (Physician)              [facility]    Comprehensive and complex medical care - Chronic disease management - Shared decision making - Care coordination - Compassionate care    Patient advocacy - Rational deprescribing - Minimally disruptive medicine - Ethical focus - Customized care    ______________________________________________________________________     Date of Service: 4/12/2019  Primary Provider: Douglas Sosa MD    Patient Care Team:  Douglas Sosa MD as PCP - General (Internal Medicine)     ______________________________________________________________________     Patient's Pharmacy:    [facility] - [place], [place] - [address] [facility] [facility]  [address] [address]  Phone: [phone] Fax: [phone]    [facility] - [address] [address]  Phone: [phone] Fax: [phone]     Patient's Contacts:  Name Home Phone Work Phone Mobile Phone Relationship Lgl JERSON Glynn [phone]   Friend    LEA LEYVA [phone]   Friend      Patient's Insurance:    Payor: HUMANA / Plan: HUMANA MEDICARE ADVANTAGE PLAN / Product Type: MEDICARE ADVANTAGE /     ______________________________________________________________________     Pinky Kangsyeda Duggan is 89 y.o. female who comes in today for:     Chief Complaint   Patient presents with   ? Hospital Visit Follow Up     FATIGUE IS DOING A LOT BETTER   ? Medication Refill     will need one rx sent to [facility] pharmacy and other refills sent to mail order pharmacy        Active Problem List:  Problem List as of 4/12/2019 Reviewed: 4/12/2019 12:02 PM by Ian  Douglas FERRELL MD       High    Nonsmoker    DNR (do not resuscitate) / DNI       Medium    HTN (hypertension)       Low    Osteoporosis       Unprioritized    Atrial fibrillation with rapid ventricular response (H)    Dilated cardiomyopathy (H)    Melena    Paroxysmal atrial fibrillation (H)    Sepsis (H)    Systolic CHF (H)    Weakness           Past Medical History:   Diagnosis Date   ? DNR (do not resuscitate) / DNI    ? HTN (hypertension)    ? Nonsmoker    ? Osteoporosis     Completed 5 years of alendronate therapy.      Current Outpatient Medications   Medication Sig   ? furosemide (LASIX) 40 MG tablet Take 1 tablet (40 mg total) by mouth daily.   ? lisinopril (PRINIVIL,ZESTRIL) 5 MG tablet Take 1 tablet (5 mg total) by mouth daily.   ? metoprolol succinate (TOPROL-XL) 100 MG 24 hr tablet Take 1 tablet (100 mg total) by mouth daily.   ? omeprazole (PRILOSEC) 20 MG capsule Take 1 capsule (20 mg total) by mouth 2 (two) times a day before meals.   ? potassium chloride (K-DUR,KLOR-CON) 20 MEQ tablet Take 1 tablet (20 mEq total) by mouth daily.   ? tetrahydrozoline (VISINE) 0.05 % ophthalmic solution Apply 1 drop to eye at bedtime as needed.     Social History     Socioeconomic History   ? Marital status:      Spouse name: None   ? Number of children: 0   ? Years of education: None   ? Highest education level: None   Occupational History   ? Occupation: Teacher,      Employer: RETIRED   Social Needs   ? Financial resource strain: None   ? Food insecurity:     Worry: None     Inability: None   ? Transportation needs:     Medical: None     Non-medical: None   Tobacco Use   ? Smoking status: Never Smoker   ? Smokeless tobacco: Never Used   Substance and Sexual Activity   ? Alcohol use: No   ? Drug use: No   ? Sexual activity: None   Lifestyle   ? Physical activity:     Days per week: None     Minutes per session: None   ? Stress: None   Relationships   ? Social connections:     Talks on phone: None      Gets together: None     Attends Tenriism service: None     Active member of club or organization: None     Attends meetings of clubs or organizations: None     Relationship status: None   ? Intimate partner violence:     Fear of current or ex partner: None     Emotionally abused: None     Physically abused: None     Forced sexual activity: None   Other Topics Concern   ? None   Social History Narrative    Former patient of Dr. Damaris Billings.        No children.  Former .        POA for medical decisions is Nephew Blake Villarreal.        Contact Alida Villarreal for results or issues - may leave message.    (Cell) - [phone]    Home phone - [phone]        Moved to [facility] in 2019.     Family History   Problem Relation Age of Onset   ? Stroke Mother    ? Leukemia Father       Family history is otherwise noncontributory.    ______________________________________________________________________     History of present illness:    Comes in with niece Alida Villarreal.  Follow up hospital and nursing home stay.    Went into the hospital with multiple issues - urinary tract infection (UTI), atrial fibrillation, congestive heart failure (CHF), anemia and diffuse weakness.  Selling [facility] and moving into [facility].    No urinary tract infection (UTI) symptoms at this time.  She feels well in relationship to this.    In relationship to anemia was seen by gastroenterology and stopped aspirin and placed on omeprazole (Prilosec) - doing well at this time.  No blood in stool.    No further atrial fibrillation since in the hospital per her report.  On new medications for CM and tolerating well.    Will have home care come in for physical therapy and occupational therapy and rehabilitation.  She is using a walker.    We reviewed her other issues noted in the assessment but not specifically addressed in the HPI above.     Comprehensive review of systems was performed today with no major problems noted except as above.    ______________________________________________________________________     Exam:    Wt Readings from Last 3 Encounters:   04/12/19 (!) 97 lb (44 kg)   04/02/19 (!) 93 lb 9.6 oz (42.5 kg)   03/22/19 (!) 95 lb 9.6 oz (43.4 kg)     BP Readings from Last 3 Encounters:   04/12/19 112/50   04/02/19 110/65   03/22/19 125/75      /50   Pulse 74   Wt (!) 97 lb (44 kg)   SpO2 93%   BMI 18.94 kg/m    The patient is comfortable, no acute distress.  Mood good.  Insight is fair.  No skin lesions or nodules of concern.  Ears clear.  Eyes are nonicteric.  Pupils equal and reactive.  Throat is clear.  Neck is supple without mass, no thyromegaly.  Carotids are clear.  No cervical or epitrochlear adenopathy.  Heart regular rate and rhythm.  Lungs clear to auscultation bilaterally.  Respiratory effort good.  No JVD.  Abdomen soft and nontender.  No hepatosplenomegaly.  Extremities show no edema.  Gait is slow with walker.    ______________________________________________________________________    Diagnostics:    Results for orders placed or performed in visit on 04/12/19   Basic Metabolic Panel   Result Value Ref Range    Sodium 141 136 - 145 mmol/L    Potassium 4.8 3.5 - 5.0 mmol/L    Chloride 105 98 - 107 mmol/L    CO2 27 22 - 31 mmol/L    Anion Gap, Calculation 9 5 - 18 mmol/L    Glucose 68 (L) 70 - 125 mg/dL    Calcium 9.5 8.5 - 10.5 mg/dL    BUN 24 8 - 28 mg/dL    Creatinine 0.88 0.60 - 1.10 mg/dL    GFR MDRD Af Amer >60 >60 mL/min/1.73m2    GFR MDRD Non Af Amer 60 (L) >60 mL/min/1.73m2   HM2(CBC w/o Differential)   Result Value Ref Range    WBC 9.0 4.0 - 11.0 thou/uL    RBC 4.21 3.80 - 5.40 mill/uL    Hemoglobin 13.2 12.0 - 16.0 g/dL    Hematocrit 38.6 35.0 - 47.0 %    MCV 92 80 - 100 fL    MCH 31.3 27.0 - 34.0 pg    MCHC 34.1 32.0 - 36.0 g/dL    RDW 11.1 11.0 - 14.5 %    Platelets 265 140 - 440 thou/uL    MPV 8.3 7.0 - 10.0 fL   BNP(B-type Natriuretic Peptide)   Result Value Ref Range    BNP 3,281 (H) 0 - 167 pg/mL      ______________________________________________________________________     Assessment:    1. Dilated cardiomyopathy (H)    2. Essential hypertension    3. Melena    4. SOB (shortness of breath)    5. Hospital discharge follow-up    6. Atrial fibrillation with rapid ventricular response (H)    7. Paroxysmal atrial fibrillation (H)    8. Sepsis, due to unspecified organism (H)    9. Weakness    10. Anemia, unspecified type    11. Urinary tract infection without hematuria, site unspecified      ______________________________________________________________________      PHQ-2 Total Score: 0 (4/12/2019 10:00 AM)    No data recorded  ______________________________________________________________________    Plan:    1. Check blood work today.  See relevant orders and diagnosis associations at the bottom of this note.   2. Continue medications as is.  3. Home care to work on rehabilitation.  4. Continue to use walker to get around.  5. Follow up 2 months   6. Likely will reduce the omeprazole (Prilosec) in the future and adjust medications as able.  7. Consider follow up echocardiogram in the future if it might change her management.       Douglas Sosa MD  General Internal Medicine  [facility]     Personal office fax - [phone]   Voice mail - [phone]  E-mail - [email or web address]     Return in about 2 months (around 6/12/2019) for visit and blood work.     Future Appointments   Date Time Provider Department Center   5/8/2019 12:50 PM Marlena Francois MD [facility]         ______________________________________________________________________     Relevant ICD-10 codes and order associations:      ICD-10-CM    1. Dilated cardiomyopathy (H) I42.0 Basic Metabolic Panel     BNP(B-type Natriuretic Peptide)     furosemide (LASIX) 40 MG tablet     potassium chloride (K-DUR,KLOR-CON) 20 MEQ tablet     metoprolol succinate (TOPROL-XL) 100 MG 24 hr tablet     lisinopril (PRINIVIL,ZESTRIL) 5  MG tablet     DISCONTINUED: furosemide (LASIX) 40 MG tablet     DISCONTINUED: potassium chloride (K-DUR,KLOR-CON) 20 MEQ tablet     DISCONTINUED: metoprolol succinate (TOPROL-XL) 100 MG 24 hr tablet     DISCONTINUED: lisinopril (PRINIVIL,ZESTRIL) 5 MG tablet   2. Essential hypertension I10 Basic Metabolic Panel     lisinopril (PRINIVIL,ZESTRIL) 5 MG tablet     DISCONTINUED: lisinopril (PRINIVIL,ZESTRIL) 5 MG tablet   3. Melena K92.1 HM2(CBC w/o Differential)     omeprazole (PRILOSEC) 20 MG capsule     DISCONTINUED: omeprazole (PRILOSEC) 20 MG capsule   4. SOB (shortness of breath) R06.02 BNP(B-type Natriuretic Peptide)   5. Hospital discharge follow-up Z09    6. Atrial fibrillation with rapid ventricular response (H) I48.91    7. Paroxysmal atrial fibrillation (H) I48.0    8. Sepsis, due to unspecified organism (H) A41.9    9. Weakness R53.1    10. Anemia, unspecified type D64.9    11. Urinary tract infection without hematuria, site unspecified N39.0

## 2021-06-27 NOTE — PROGRESS NOTES
Progress Notes by Douglas Sosa MD at 6/24/2019  9:40 AM     Author: Douglas Sosa MD Service: -- Author Type: Physician    Filed: 6/24/2019  8:51 PM Encounter Date: 6/24/2019 Status: Signed    : Douglas Sosa MD (Physician)              [facility]    Comprehensive and complex medical care - Chronic disease management - Shared decision making - Care coordination - Compassionate care    Patient advocacy - Rational deprescribing - Minimally disruptive medicine - Ethical focus - Customized care    ______________________________________________________________________     Date of Service: 6/24/2019  Primary Provider: Douglas Sosa MD    Patient Care Team:  Douglas Sosa MD as PCP - General (Internal Medicine)     ______________________________________________________________________     Patient's Pharmacy:    [facility] - [place], [place] - [address] [facility] [address]  [address] [facility] [address]  Phone: [phone] Fax: [phone]    [facility] - [address] [address]  Phone: [phone] Fax: [phone]     Patient's Contacts:  Name Home Phone Work Phone Mobile Phone Relationship Lgl DONI Begum [phone] Nephew SITA Campbell [phone] Niece No     Patient's Insurance:    Payor: HUMANA / Plan: HU[employer]NA MEDICARE ADVANTAGE PLAN / Product Type: MEDICARE ADVANTAGE /   ______________________________________________________________________   ______________________________________________________________________     Pinky George Olga Lidia is 89 y.o. female who comes in today for:    Chief Complaint   Patient presents with   ? Follow-up     still has diffuse weakness but is better, AFIB, CHF, ANEMIA   ? Medication Questions     SHOULD TAKE VITAMINS   ? Medication Problem     POTASSIUM PILL IS TO BIG  TO TAKE, EVEN IF CUTS IN HALF       Active Problem List:  Problem List as of 6/24/2019 Reviewed: 5/8/2019  1:25 PM by Marlena Francois MD       High    Nonsmoker    DNR (do not resuscitate) / DNI       Medium    HTN (hypertension)       Low    Osteoporosis       Unprioritized    Atrial fibrillation with rapid ventricular response (H)    Dilated cardiomyopathy (H)    Melena    Paroxysmal atrial fibrillation (H)    Sepsis (H)           Current Outpatient Medications   Medication Sig   ? furosemide (LASIX) 40 MG tablet Take 1 tablet (40 mg total) by mouth daily.   ? lisinopril (PRINIVIL,ZESTRIL) 5 MG tablet Take 1 tablet (5 mg total) by mouth daily.   ? metoprolol succinate (TOPROL-XL) 100 MG 24 hr tablet Take 1 tablet (100 mg total) by mouth daily.   ? omeprazole (PRILOSEC) 20 MG capsule Take 1 capsule (20 mg total) by mouth daily before breakfast.   ? potassium chloride (K-DUR,KLOR-CON) 20 MEQ tablet Take 1 tablet (20 mEq total) by mouth daily.   ? tetrahydrozoline (VISINE) 0.05 % ophthalmic solution Apply 1 drop to eye at bedtime as needed.     Social History     Social History Narrative    Former patient of Dr. Damaris Billings.        No children.  Former .        POA for medical decisions is Nephaditya Villarreal.        Contact Alida Villarreal for results or issues - may leave message.    (cell) - [phone]    Home phone - [phone]        Moved to [facility] in 2019.     ______________________________________________________________________     History of present illness:    Patient comes in today with her niece.    We reviewed her congestive heart failure.  She denies any major shortness of breath.  She has minimal foot and ankle swelling.  She denies any dizziness or lightheadedness with her medications.  She is on a beta-blocker and ACE inhibitor.  She denies any side effects from these.  She also had atrial fibrillation with rapid ventricular response and has had no further signs or symptoms  of this.  She has followed up with cardiology related to this we will continue to monitor closely.    We reviewed her high blood pressure and her blood pressure is doing well without issue.    We reviewed her ejection fraction of 20% we talked about different things related to this.    Reviewed her black stools and she is on omeprazole twice daily we talked about reducing this but continuing it indefinitely at this time provided that there are no side effects.    She is had no symptoms of urinary tract infection.    She had issues with constipation and has had this for a while.  She did have to take some Dulcolax which caused her to be too loose.  We reviewed different options related to this.  She has not noticed any blood in her stools.    We reviewed her bruising on her forearms and this is stable and this was reviewed with her as well.    We reviewed her other issues noted in the assessment but not specifically addressed in the HPI above.     The 14-system review of systems form for [facility] was completed by patient, reviewed by me, and pertinent problems are reviewed above.     Past medical, family and social history reviewed today. .    ______________________________________________________________________    Exam:    Wt Readings from Last 3 Encounters:   06/24/19 100 lb (45.4 kg)   05/08/19 (!) 98 lb (44.5 kg)   04/12/19 (!) 97 lb (44 kg)     BP Readings from Last 3 Encounters:   06/24/19 122/64   05/08/19 110/70   04/12/19 112/50     /64   Pulse 75   Wt 100 lb (45.4 kg)   SpO2 97%   BMI 19.53 kg/m      The patient is comfortable, no acute distress.  Mood good.  Insight good.  Eyes are nonicteric.  Neck is supple without mass.  No cervical adenopathy.  No thyromegaly. Heart regular rate and rhythm.  Lungs clear to auscultation bilaterally.  Respiratory effort is good.  Abdomen soft and nontender.  No hepatosplenomegaly.  Extremities trace pedal edema.  She  has  senile keratosis of her arms.    ______________________________________________________________________    Diagnostics:    Results for orders placed or performed in visit on 06/24/19   HM2(CBC w/o Differential)   Result Value Ref Range    WBC 6.3 4.0 - 11.0 thou/uL    RBC 4.37 3.80 - 5.40 mill/uL    Hemoglobin 12.4 12.0 - 16.0 g/dL    Hematocrit 38.4 35.0 - 47.0 %    MCV 88 80 - 100 fL    MCH 28.5 27.0 - 34.0 pg    MCHC 32.4 32.0 - 36.0 g/dL    RDW 11.7 11.0 - 14.5 %    Platelets 266 140 - 440 thou/uL    MPV 8.2 7.0 - 10.0 fL   Basic Metabolic Panel   Result Value Ref Range    Sodium 142 136 - 145 mmol/L    Potassium 5.3 (H) 3.5 - 5.0 mmol/L    Chloride 104 98 - 107 mmol/L    CO2 27 22 - 31 mmol/L    Anion Gap, Calculation 11 5 - 18 mmol/L    Glucose 71 70 - 125 mg/dL    Calcium 9.9 8.5 - 10.5 mg/dL    BUN 26 8 - 28 mg/dL    Creatinine 1.05 0.60 - 1.10 mg/dL    GFR MDRD Af Amer 59 (L) >60 mL/min/1.73m2    GFR MDRD Non Af Amer 49 (L) >60 mL/min/1.73m2   BNP(B-type Natriuretic Peptide)   Result Value Ref Range    BNP 1,339 (H) 0 - 167 pg/mL     ______________________________________________________________________    Assessment:    1. PAF (paroxysmal atrial fibrillation) (H), single episode    2. Dilated cardiomyopathy (H)    3. Essential hypertension    4. Melena    5. SOB (shortness of breath)    6. Cardiac left ventricular ejection fraction 20 percent - 2019    7. Sepsis, due to unspecified organism (H)    8. Weakness    9. Anemia, unspecified type    10. Urinary tract infection without hematuria, site unspecified    11. Constipation, unspecified constipation type       ______________________________________________________________________     PHQ-2 Total Score: 0 (4/12/2019 10:00 AM)    No data recorded  ______________________________________________________________________     BMI Readings from Last 1 Encounters:   06/24/19 19.53 kg/m       ______________________________________________________________________       Plan:    1. Check blood work today.  See relevant orders and diagnosis associations at the bottom of this note.   2. Stop potassium supplement   3. Reduce the omeprazole (Prilosec) to daily.  4. Consider increasing the furosemide (Lasix) in the future if needed.  5. Use water and fiber for constipation or increase medications in the future for this.  6. Senile purpura stable.         Douglas Sosa MD  General Internal Medicine  [facility]     Personal office fax - [phone]   Voice mail - [phone]  E-mail - [email or web address]      Return in about 4 months (around 10/24/2019) for visit and blood work.     Future Appointments   Date Time Provider Department Center   10/22/2019  9:40 AM Douglas Sosa MD [facility]        ______________________________________________________________________    Relevant ICD-10 codes and order associations:      ICD-10-CM    1. PAF (paroxysmal atrial fibrillation) (H), single episode I48.0    2. Dilated cardiomyopathy (H) I42.0 furosemide (LASIX) 40 MG tablet     lisinopril (PRINIVIL,ZESTRIL) 5 MG tablet     metoprolol succinate (TOPROL-XL) 100 MG 24 hr tablet   3. Essential hypertension I10 lisinopril (PRINIVIL,ZESTRIL) 5 MG tablet   4. Melena K92.1 omeprazole (PRILOSEC) 20 MG capsule   5. SOB (shortness of breath) R06.02 HM2(CBC w/o Differential)     Basic Metabolic Panel     BNP(B-type Natriuretic Peptide)   6. Cardiac left ventricular ejection fraction 20 percent - 2019 R09.89    7. Sepsis, due to unspecified organism (H) A41.9    8. Weakness R53.1    9. Anemia, unspecified type D64.9    10. Urinary tract infection without hematuria, site unspecified N39.0    11. Constipation, unspecified constipation type K59.00

## 2021-06-27 NOTE — TELEPHONE ENCOUNTER
Please call patient's Niece Alida Villarreal at (Cell) [phone] and may leave message     ______________________________________________________________________     Home phone:  [phone] (home)     Cell phone:   Telephone Information:   Mobile [phone]       Other contacts:  Name Home Phone Work Phone Mobile Phone Relationship Lgl GrJERSON Aguayo [phone]   Friend    LEA LEYVA [phone]   Friend      ______________________________________________________________________     Your kidney tests are normal.  Your electrolytes are also normal.  There is no signs of diabetes.      Her blood counts are normal and she is not anemic. She was very anemic in the past so this is a great improvement!    Her heart failure test is still high but I do not recommend any change in her medication.    Follow up again in 8 weeks.  Please schedule.    Douglas Sosa MD  [facility]  4/14/2019, 8:42 AM     ______________________________________________________________________    Recent Results (from the past 240 hour(s))   Basic Metabolic Panel   Result Value Ref Range    Sodium 141 136 - 145 mmol/L    Potassium 4.8 3.5 - 5.0 mmol/L    Chloride 105 98 - 107 mmol/L    CO2 27 22 - 31 mmol/L    Anion Gap, Calculation 9 5 - 18 mmol/L    Glucose 68 (L) 70 - 125 mg/dL    Calcium 9.5 8.5 - 10.5 mg/dL    BUN 24 8 - 28 mg/dL    Creatinine 0.88 0.60 - 1.10 mg/dL    GFR MDRD Af Amer >60 >60 mL/min/1.73m2    GFR MDRD Non Af Amer 60 (L) >60 mL/min/1.73m2   HM2(CBC w/o Differential)   Result Value Ref Range    WBC 9.0 4.0 - 11.0 thou/uL    RBC 4.21 3.80 - 5.40 mill/uL    Hemoglobin 13.2 12.0 - 16.0 g/dL    Hematocrit 38.6 35.0 - 47.0 %    MCV 92 80 - 100 fL    MCH 31.3 27.0 - 34.0 pg    MCHC 34.1 32.0 - 36.0 g/dL    RDW 11.1 11.0 - 14.5 %    Platelets 265 140 - 440 thou/uL    MPV 8.3 7.0 - 10.0 fL   BNP(B-type Natriuretic Peptide)   Result Value Ref Range    BNP 3,281 (H) 0 - 167 pg/mL        ______________________________________________________________________        used

## 2021-06-27 NOTE — PROGRESS NOTES
Progress Notes by Douglas Sosa MD at 8/30/2019 10:05 AM     Author: Douglas Sosa MD Service: -- Author Type: Physician    Filed: 8/30/2019  9:55 PM Encounter Date: 8/30/2019 Status: Signed    : Douglas Sosa MD (Physician)            [facility]    Comprehensive and complex medical care - Chronic disease management - Shared decision making - Care coordination - Compassionate care    Patient advocacy - Rational deprescribing - Minimally disruptive medicine - Ethical focus - Customized care    ______________________________________________________________________     Date of Service: 8/30/2019  Primary Provider: Douglas Sosa MD    Patient Care Team:  Douglas Sosa MD as PCP - General (Internal Medicine)     ______________________________________________________________________     Patient's Pharmacy:    [facility] [place] [id number] [facility]  [address] [address]  Phone: [phone] Fax: [phone]    [facility] - [place], [place] - [address] [facility]  [address]  [facility] [address]  Phone: [phone] Fax: [phone]    [facility] - [place], [place] - [address]  [address]  [facility] [address]  Phone: [phone] Fax: [phone]     Patient's Contacts:  Name Home Phone Work Phone Mobile Phone Relationship Lgl DONI Begum [phone] Araceli Estevez   SITA HOPE [phone] Carrie No     Patient's Insurance:    Payor: HUMANA / Plan: [employer] MEDICARE ADVANTAGE PLAN / Product Type: MEDICARE ADVANTAGE /   ______________________________________________________________________   ______________________________________________________________________     Pinky Duggan is a 89 y.o. female who comes in today for:    Chief Complaint   Patient presents with   ? Follow-up     hip surgery  went well   ? Shortness of Breath     if walking far       Active Problem List:  Problem List as of 8/30/2019 Reviewed: 8/30/2019  9:50 PM by Douglas Sosa MD       High    Nonsmoker    DNR (do not resuscitate) / DNI    Paroxysmal atrial fibrillation (H) - single episode 3/19       Medium    Dilated cardiomyopathy (H)    HTN (hypertension)       Low    Osteoporosis       Unprioritized    Cardiac left ventricular ejection fraction 20% - 2019    Melena    Senile purpura (H)           Current Outpatient Medications   Medication Sig   ? acetaminophen (TYLENOL) 500 MG tablet Take 1,000 mg by mouth every 6 (six) hours as needed for pain.   ? aspirin 325 MG EC tablet Take 1 tablet (325 mg total) by mouth daily.   ? furosemide (LASIX) 40 MG tablet Take 1 tablet (40 mg total) by mouth daily.   ? lisinopril (PRINIVIL,ZESTRIL) 5 MG tablet Take 1 tablet (5 mg total) by mouth every evening.   ? metoprolol tartrate (LOPRESSOR) 25 MG tablet Take 1 tablet (25 mg total) by mouth 2 (two) times a day.   ? omeprazole (PRILOSEC) 20 MG capsule Take 1 capsule (20 mg total) by mouth daily before breakfast.     Social History     Social History Narrative    Former patient of Dr. Damaris Billings.        No children.  Former .        POA for medical decisions is Nephaditya Villarreal.        Contact Alida Villarreal for results or issues - may leave message.    Home - [phone]    Cell phone - [phone]        Moved to [facility] in 2019.  Walks with a walker.  Remains active.     ______________________________________________________________________     History of present illness:    Comes in for follow up after hospital stay with niece.    First in for hip fracture.  She had to go to [facility] after the surgery.  She is back at her assisted living ([facility]  She is getting around with a walker.  This was reviewed with her.  No furterh follow up with orthopedics at this time.    Reviewed her cardiomyopathy and she  does get shortness of breath with exertion but no worse than in the past.  We will check her blood work in relationship to this.    Has some issues with her memory which might be more pronounced since her fall but not horrible.    No atrial fibrillation with recent hospital stay.    Stomach doing well.    We reviewed her other issues noted in the assessment but not specifically addressed in the HPI above.     Past medical, family and social history reviewed today.     Comprehensive review of systems was performed today with no major problems noted except as above.     ______________________________________________________________________    Exam:    Wt Readings from Last 3 Encounters:   08/30/19 102 lb (46.3 kg)   07/16/19 104 lb 3.2 oz (47.3 kg)   07/16/19 104 lb 3.2 oz (47.3 kg)     BP Readings from Last 3 Encounters:   08/30/19 124/60   07/16/19 124/62   07/16/19 131/54     /60   Pulse 70   Wt 102 lb (46.3 kg)   SpO2 96%   BMI 17.51 kg/m      The patient is comfortable, no acute distress.  Mood good.  Insight fair.  Eyes are nonicteric.  Neck is supple without mass.  No cervical adenopathy.  No thyromegaly. Heart regular rate and rhythm.  Lungs clear to auscultation bilaterally.  Respiratory effort is good.  Abdomen soft and nontender.  No hepatosplenomegaly.  Extremities no edema.  Gait is good overall.  No obvious pain noted.    ______________________________________________________________________    Diagnostics:    Results for orders placed or performed in visit on 08/30/19   Basic Metabolic Panel   Result Value Ref Range    Sodium 142 136 - 145 mmol/L    Potassium 5.2 (H) 3.5 - 5.0 mmol/L    Chloride 104 98 - 107 mmol/L    CO2 28 22 - 31 mmol/L    Anion Gap, Calculation 10 5 - 18 mmol/L    Glucose 66 (L) 70 - 125 mg/dL    Calcium 9.4 8.5 - 10.5 mg/dL    BUN 29 (H) 8 - 28 mg/dL    Creatinine 1.13 (H) 0.60 - 1.10 mg/dL    GFR MDRD Af Amer 54 (L) >60 mL/min/1.73m2    GFR MDRD Non Af Amer 45 (L) >60  mL/min/1.73m2   HM2(CBC w/o Differential)   Result Value Ref Range    WBC 9.6 4.0 - 11.0 thou/uL    RBC 4.10 3.80 - 5.40 mill/uL    Hemoglobin 11.5 (L) 12.0 - 16.0 g/dL    Hematocrit 36.5 35.0 - 47.0 %    MCV 89 80 - 100 fL    MCH 28.0 27.0 - 34.0 pg    MCHC 31.4 (L) 32.0 - 36.0 g/dL    RDW 12.8 11.0 - 14.5 %    Platelets 275 140 - 440 thou/uL    MPV 8.2 7.0 - 10.0 fL     ______________________________________________________________________    Assessment:    1. Essential hypertension    2. Dilated cardiomyopathy (H)    3. Hip fracture requiring operative repair, left, closed, initial encounter (H)    4. Left displaced femoral neck fracture (H)    5. Cardiac left ventricular ejection fraction 20% - 2019    6. Age-related osteoporosis with current pathological fracture with routine healing, subsequent encounter    7. Paroxysmal atrial fibrillation (H) - single episode 3/19    8. Melena       ______________________________________________________________________     PHQ-2 Total Score: 0 (4/12/2019 10:00 AM)    No data recorded  ______________________________________________________________________     BMI Readings from Last 1 Encounters:   08/30/19 17.51 kg/m      ______________________________________________________________________       Plan:    1. Continue current medications.    2. Check blood work today.  See relevant orders and diagnosis associations at the bottom of this note.   3. Continue aspirin and omeprazole (Prilosec) long term.  4. Follow up sooner if issues.    Medication reconciliation was performed as a part of this visit as well.         Douglas Sosa MD  General Internal Medicine  [facility]     Personal office fax - [phone]   Voice mail - [phone]  E-mail - [email or web address]      Return in about 3 months (around 11/30/2019) for visit and blood work.     Future Appointments   Date Time Provider Department Center   10/22/2019  9:40 AM Douglas Sosa MD [facility]        ______________________________________________________________________    Relevant ICD-10 codes and order associations:      ICD-10-CM    1. Essential hypertension I10 Basic Metabolic Panel     HM2(CBC w/o Differential)   2. Dilated cardiomyopathy (H) I42.0 Basic Metabolic Panel     HM2(CBC w/o Differential)   3. Hip fracture requiring operative repair, left, closed, initial encounter (H) S72.002A    4. Left displaced femoral neck fracture (H) S72.002A    5. Cardiac left ventricular ejection fraction 20% - 2019 R09.89    6. Age-related osteoporosis with current pathological fracture with routine healing, subsequent encounter M80.00XD    7. Paroxysmal atrial fibrillation (H) - single episode 3/19 I48.0    8. Melena K92.1

## 2021-06-28 NOTE — PROGRESS NOTES
Progress Notes by Douglas Sosa MD at 1/3/2020  1:50 PM     Author: Douglas Sosa MD Service: -- Author Type: Physician    Filed: 1/5/2020  9:30 AM Encounter Date: 1/3/2020 Status: Signed    : Douglas Sosa MD (Physician)              [facility]    Comprehensive and complex medical care - Chronic disease management - Shared decision making - Care coordination - Compassionate care    Patient advocacy - Rational deprescribing - Minimally disruptive medicine - Ethical focus - Customized care          Date of Service: 1/3/2020  Primary Provider: Douglas Sosa MD    Patient Care Team:  Douglas Sosa MD as PCP - General (Internal Medicine)  Mili Hylton CNP as Assigned PCP     ______________________________________________________________________     Patient's Pharmacy:    [facility] [place] [id number] [facility]  [address] [facility] [address]  [facility] [address]  Phone: [phone] Fax: [phone]    [facility] - [place], [place] - [address] [facility]  [address] [facility] [address]  [facility] [address]  Phone: [phone] Fax: [phone]    [facility] - [place], [place] - [address]  [address]  [facility] [address]  Phone: [phone] Fax: [phone]     Patient's Contacts:  Name Home Phone Work Phone Mobile Phone Relationship Lgl DONI Begum [phone] Nephew Zenaida HOPESITA [phone] Niece No     Patient's Insurance:    Payor: HUMANA / Plan: HUMANA MEDICARE ADVANTAGE PLAN / Product Type: MEDICARE ADVANTAGE /           Pinky Kangan Duggan is a 89 y.o. female who comes in today for:    Chief Complaint   Patient presents with   ? Follow-up     cardiomyopathy, memory issues       Active Problem List:  Problem List as of 1/3/2020 Reviewed: 1/3/2020 11:26 PM by Douglas Sosa MD       High    Nonsmoker    DNR (do not  resuscitate) / DNI    Paroxysmal atrial fibrillation (H) - single episode 3/19       Medium    Dilated cardiomyopathy (H)    HTN (hypertension)       Low    Osteoporosis       Unprioritized    Cardiac left ventricular ejection fraction 20% - 2019    Melena    Senile purpura (H)           Current Outpatient Medications   Medication Sig   ? acetaminophen (TYLENOL) 500 MG tablet Take 1,000 mg by mouth every 6 (six) hours as needed for pain.   ? aspirin 325 MG EC tablet Take 1 tablet (325 mg total) by mouth daily.   ? furosemide (LASIX) 40 MG tablet Take 1 tablet (40 mg total) by mouth daily.   ? lisinopril (PRINIVIL,ZESTRIL) 5 MG tablet Take 1 tablet (5 mg total) by mouth every evening.   ? metoprolol tartrate (LOPRESSOR) 25 MG tablet Take 1 tablet (25 mg total) by mouth 2 (two) times a day.   ? omeprazole (PRILOSEC) 20 MG capsule Take 1 capsule (20 mg total) by mouth daily before breakfast.     Social History     Social History Narrative    Former patient of Dr. Damaris Billings.        No children.  Former .        POA for medical decisions is Nephaditya Villarreal.        Contact Alida Villarreal for results or issues - may leave message.    Home - [phone]    Cell phone - [phone]        Moved to [facility] in 2019.  Walks with a walker.  Remains active.       Subjective:     Accompanied by niece.    Her congestive heart failure (CHF) and cardiomyopathy is doing well.  No change in her baseline shortness of breath, but does get shortness of breath with exertion.  No lower extremity edema issues.    Taking her medications regularly and they are given by her assisted living ([facility]).    No obvious signs of atrial fibrillation coming back.    Hip fracture not giving issues after repair.  We reviewed her x-rays related to this.    Reviewed her hypertension today.  Blood pressure has been in the goal range.  Denies any excessive dizziness from the medication with this.     No change in senile purpura.    We  reviewed her other issues noted in the assessment but not specifically addressed in the HPI above.     On review of systems, the patient denies any chest pain or shortness of breath.    Objective:     Wt Readings from Last 3 Encounters:   01/03/20 101 lb (45.8 kg)   08/30/19 102 lb (46.3 kg)   07/16/19 104 lb 3.2 oz (47.3 kg)     BP Readings from Last 3 Encounters:   01/03/20 122/58   08/30/19 124/60   07/16/19 124/62     /58   Pulse 89   Wt 101 lb (45.8 kg)   SpO2 94%   BMI 17.34 kg/m     The patient is comfortable, no acute distress.  Mood good.  Insight fair.  Eyes are nonicteric.  Neck is supple without mass.  No cervical adenopathy.  No thyromegaly. Heart regular rate and rhythm.  Lungs clear to auscultation bilaterally.  Respiratory effort is good.  Abdomen soft and nontender.  No hepatosplenomegaly.  Extremities no edema.    Diagnostics:     Results for orders placed or performed in visit on 01/03/20   BNP(B-type Natriuretic Peptide)   Result Value Ref Range    BNP 1,470 (H) 0 - 167 pg/mL   Basic Metabolic Panel   Result Value Ref Range    Sodium 143 136 - 145 mmol/L    Potassium 5.2 (H) 3.5 - 5.0 mmol/L    Chloride 106 98 - 107 mmol/L    CO2 27 22 - 31 mmol/L    Anion Gap, Calculation 10 5 - 18 mmol/L    Glucose 81 70 - 125 mg/dL    Calcium 9.2 8.5 - 10.5 mg/dL    BUN 31 (H) 8 - 28 mg/dL    Creatinine 1.21 (H) 0.60 - 1.10 mg/dL    GFR MDRD Af Amer 50 (L) >60 mL/min/1.73m2    GFR MDRD Non Af Amer 41 (L) >60 mL/min/1.73m2   HM2(CBC w/o Differential)   Result Value Ref Range    WBC 7.8 4.0 - 11.0 thou/uL    RBC 4.28 3.80 - 5.40 mill/uL    Hemoglobin 12.3 12.0 - 16.0 g/dL    Hematocrit 38.4 35.0 - 47.0 %    MCV 90 80 - 100 fL    MCH 28.7 27.0 - 34.0 pg    MCHC 32.0 32.0 - 36.0 g/dL    RDW 14.9 (H) 11.0 - 14.5 %    Platelets 250 140 - 440 thou/uL    MPV 8.2 7.0 - 10.0 fL       Quality review:     PHQ-2 Total Score: 0 (1/3/2020  2:00 PM)    No data  recorded  ______________________________________________________________________     BMI Readings from Last 1 Encounters:   01/03/20 17.34 kg/m        Assessment and Plan:     1. Essential hypertension  No change    - Basic Metabolic Panel    2. SOB (shortness of breath)  No changes in medications.    - BNP(B-type Natriuretic Peptide)  - HM2(CBC w/o Differential)    3. Dilated cardiomyopathy (H)  Continue current medications.    4. Paroxysmal atrial fibrillation (H)  No signs of recurrent problems.    5. Senile purpura (H)  Stable.            Douglas Sosa MD  General Internal Medicine  [facility]    Personal office fax - [phone]   Voice mail - [phone]  E-mail - [email or web address]     Return in about 6 months (around 7/3/2020) for visit and blood work.     No future appointments.      HCC issues resolved at this visit.

## 2021-06-29 NOTE — PROGRESS NOTES
Progress Notes by Douglas Sosa MD at 2020 10:55 AM     Author: Douglas Sosa MD Service: -- Author Type: Physician    Filed: 2020 11:05 PM Encounter Date: 2020 Status: Signed    : Douglas Sosa MD (Physician)              [facility]    Comprehensive and complex medical care - Chronic disease management - Shared decision making - Care coordination - Compassionate care    Patient advocacy - Rational deprescribing - Minimally disruptive medicine - Ethical focus - Customized care          Date of Service: 2020  Primary Provider: oDuglas Sosa MD    Patient Care Team:  Douglas Sosa MD as PCP - General (Internal Medicine)  Douglas Sosa MD as Assigned PCP     ______________________________________________________________________     Patient's Pharmacy:      [facility] [address]  [address] [address]  Phone: [phone] Fax: [phone]    [facility] - [place], [place] - [address] [facility]  [address] [facility] [address] [facility] [address]  [facility] [address]  Phone: [phone] Fax: [phone]    [facility] - [place], [place] - [address]  [address]  [facility] [address]  Phone: [phone] Fax: [phone]     Patient's Contacts:  Name Home Phone Work Phone Mobile Phone Relationship Lgl Grd   JAYYDONI [phone] Nephew SITA Campbell [phone] Niece No       Patient's Insurance:    Payor/Plan Subscr  Sex Relation Sub. Ins. ID Effective Group Num   1. MEDICARE - ME* CHANTEL ROSAS* 5/15/1924 Female  [id number] 1989                                    [place], [address]   2. HUMANA - AVE* CHANTEL ROSAS* 5/15/1924 Female  [id number] Not Eff [id number]                                   [address] [address]           Pinky Duggan is a 89 y.o. female who comes in today  for:    Chief Complaint   Patient presents with   ? Follow-up     CHF, AFIB       Active Problem List:  Problem List as of 8/7/2020 Reviewed: 1/3/2020 11:26 PM by Douglas Sosa MD       High    Nonsmoker    DNR (do not resuscitate) / DNI    Paroxysmal atrial fibrillation (H) - single episode 3/19       Medium    Dilated cardiomyopathy (H)    HTN (hypertension)       Low    Osteoporosis       Unprioritized    Cardiac left ventricular ejection fraction 20% - 2019    Melena    Senile purpura (H)    Uses walker           Current Outpatient Medications   Medication Sig   ? acetaminophen (TYLENOL) 500 MG tablet Take 1,000 mg by mouth every 6 (six) hours as needed for pain.   ? aspirin 325 MG EC tablet Take 1 tablet (325 mg total) by mouth daily.   ? furosemide (LASIX) 40 MG tablet Take 1 tablet (40 mg total) by mouth daily.   ? lisinopril (PRINIVIL,ZESTRIL) 5 MG tablet Take 1 tablet (5 mg total) by mouth every evening.   ? metoprolol tartrate (LOPRESSOR) 25 MG tablet Take 1 tablet (25 mg total) by mouth 2 (two) times a day.   ? omeprazole (PRILOSEC) 20 MG capsule Take 1 capsule (20 mg total) by mouth daily before breakfast.     Social History     Social History Narrative    Former patient of Dr. Damaris Billings.        No children.  Former .        POA for medical decisions is Nephaditya Villarreal.        Contact Alida Villarreal for results or issues - may leave message.    Home - [phone]    Cell phone - [phone]        Moved to [facility] in 2019.  Walks with a walker.  Remains active.       Subjective:     Accompanied by Alida.    Doing well overall.    Reviewed atrial fibrillation and no symptoms of rapid heartbeat at this time.    Reviewed cardiomyopathy and this seems to be doing okay.  Breathing is okay but some mild shortness of breath.  No new symptoms or lower extremity edema.    Reviewed her hypertension today.  Blood pressure has been in the goal range.  Denies any excessive dizziness  from the medication with this.     We reviewed her other issues noted in the assessment but not specifically addressed in the HPI above.     On review of systems, the patient denies any chest pain or fever or chills.    Objective:     Wt Readings from Last 3 Encounters:   08/07/20 (!) 96 lb (43.5 kg)   01/03/20 101 lb (45.8 kg)   08/30/19 102 lb (46.3 kg)       BP Readings from Last 3 Encounters:   08/07/20 120/60   01/03/20 122/58   08/30/19 124/60       /60   Pulse 90   Wt (!) 96 lb (43.5 kg)   BMI 16.48 kg/m     The patient is comfortable, no acute distress.  Mood good.  Insight fair.  Eyes are nonicteric.  Neck is supple without mass.  No cervical adenopathy.  No thyromegaly. Heart regular rate and rhythm.  Lungs clear to auscultation bilaterally.  Respiratory effort is good.  Abdomen soft and nontender.  No hepatosplenomegaly.  Extremities no edema.      Diagnostics:     Results for orders placed or performed in visit on 08/07/20   HM2(CBC w/o Differential)   Result Value Ref Range    WBC 9.7 4.0 - 11.0 thou/uL    RBC 4.05 3.80 - 5.40 mill/uL    Hemoglobin 12.7 12.0 - 16.0 g/dL    Hematocrit 38.1 35.0 - 47.0 %    MCV 94 80 - 100 fL    MCH 31.3 27.0 - 34.0 pg    MCHC 33.3 32.0 - 36.0 g/dL    RDW 11.9 11.0 - 14.5 %    Platelets 243 140 - 440 thou/uL    MPV 8.2 7.0 - 10.0 fL   Basic Metabolic Panel   Result Value Ref Range    Sodium 140 136 - 145 mmol/L    Potassium 5.4 (H) 3.5 - 5.0 mmol/L    Chloride 106 98 - 107 mmol/L    CO2 20 (L) 22 - 31 mmol/L    Anion Gap, Calculation 14 5 - 18 mmol/L    Glucose 83 70 - 125 mg/dL    Calcium 9.5 8.5 - 10.5 mg/dL    BUN 38 (H) 8 - 28 mg/dL    Creatinine 1.19 (H) 0.60 - 1.10 mg/dL    GFR MDRD Af Amer 51 (L) >60 mL/min/1.73m2    GFR MDRD Non Af Amer 42 (L) >60 mL/min/1.73m2       Assessment:     1. Dilated cardiomyopathy (H)    2. SOB (shortness of breath)    3. Paroxysmal atrial fibrillation (H) - single episode 3/19    4. Essential hypertension        Quality  review:     PHQ-2 Total Score: 1 (8/7/2020 11:00 AM)      No data recorded  ______________________________________________________________________     BMI Readings from Last 1 Encounters:   08/07/20 16.48 kg/m        Plan:     1. Check blood work today.  See relevant orders and diagnosis associations at the bottom of this note.   2. Continue current medications.  3. Follow up sooner if issues.      Douglas Sosa MD  General Internal Medicine  [facility]       Return in about 6 months (around 2/7/2021) for visit and blood work.     Future Appointments   Date Time Provider Department Center   2/9/2021 10:55 AM Douglas Sosa MD [facility]         ______________________________________________________________________     Relevant ICD-10 codes and order associations:      ICD-10-CM    1. Dilated cardiomyopathy (H)  I42.0    2. SOB (shortness of breath)  R06.02 HM2(CBC w/o Differential)     Basic Metabolic Panel   3. Paroxysmal atrial fibrillation (H) - single episode 3/19  I48.0    4. Essential hypertension  I10

## 2021-07-04 NOTE — LETTER
Letter by Douglas Sosa MD at      Author: Douglas Sosa MD Service: -- Author Type: --    Filed:  Encounter Date: 5/31/2021 Status: (Other)       Pinky George Duggan   [address] [place] [address]         Dear Pinky,    I am sending you this letter to remind you that you are due for a follow up visit in August.    Please call to schedule this visit as soon as possible as our schedule fills up quickly.    If you already have an appointment scheduled with me for around this time, please ignore this notification.    I look forward to seeing you soon.      If you have any questions regarding the above, please feel free to contact me at [phone].        Sincerely,       Douglas Sosa MD  General Internal Medicine  [facility]

## 2021-07-14 PROBLEM — I50.20 SYSTOLIC CHF (H): Status: RESOLVED | Noted: 2019-03-10 | Resolved: 2019-04-14

## 2021-08-03 PROBLEM — I48.91 ATRIAL FIBRILLATION WITH RAPID VENTRICULAR RESPONSE (H): Status: RESOLVED | Noted: 2019-03-08 | Resolved: 2019-06-24

## 2021-08-03 PROBLEM — S72.002A CLOSED LEFT HIP FRACTURE, INITIAL ENCOUNTER (H): Status: RESOLVED | Noted: 2019-06-27 | Resolved: 2019-08-30

## 2021-08-03 PROBLEM — S72.002A HIP FRACTURE REQUIRING OPERATIVE REPAIR, LEFT, CLOSED, INITIAL ENCOUNTER (H): Status: RESOLVED | Noted: 2019-06-27 | Resolved: 2019-08-30

## 2021-08-03 PROBLEM — A41.9 SEPSIS (H): Status: RESOLVED | Noted: 2019-03-08 | Resolved: 2019-08-30

## 2021-08-03 PROBLEM — S72.002A LEFT DISPLACED FEMORAL NECK FRACTURE (H): Status: RESOLVED | Noted: 2019-06-27 | Resolved: 2019-08-30

## 2021-11-09 NOTE — TELEPHONE ENCOUNTER
"Staff at [facility] calling to report that patient had a fall this morning.  She was attempting to transfer from bed to standing and she fell to the floor.  No signs of injury or pains/bruising.  Vitals all okay both immediately following the fall as well as \"some time later\".  This is provider FYI only per Stephanie.  "

## 2022-01-01 ENCOUNTER — MEDICAL CORRESPONDENCE (OUTPATIENT)
Dept: HEALTH INFORMATION MANAGEMENT | Facility: CLINIC | Age: 87
End: 2022-01-01
Payer: COMMERCIAL

## 2022-01-01 ENCOUNTER — NURSE TRIAGE (OUTPATIENT)
Dept: NURSING | Facility: CLINIC | Age: 87
End: 2022-01-01
Payer: COMMERCIAL

## 2022-01-01 ENCOUNTER — APPOINTMENT (OUTPATIENT)
Dept: CARDIOLOGY | Facility: HOSPITAL | Age: 87
DRG: 291 | End: 2022-01-01
Payer: COMMERCIAL

## 2022-01-01 ENCOUNTER — APPOINTMENT (OUTPATIENT)
Dept: RADIOLOGY | Facility: HOSPITAL | Age: 87
DRG: 291 | End: 2022-01-01
Payer: COMMERCIAL

## 2022-01-01 ENCOUNTER — HOSPITAL ENCOUNTER (INPATIENT)
Facility: HOSPITAL | Age: 87
LOS: 3 days | Discharge: NURSING, REHAB OR LONG-TERM CARE | DRG: 291 | End: 2022-04-19
Attending: EMERGENCY MEDICINE | Admitting: INTERNAL MEDICINE
Payer: COMMERCIAL

## 2022-01-01 ENCOUNTER — PATIENT OUTREACH (OUTPATIENT)
Dept: CARE COORDINATION | Facility: CLINIC | Age: 87
End: 2022-01-01
Payer: COMMERCIAL

## 2022-01-01 ENCOUNTER — APPOINTMENT (OUTPATIENT)
Dept: PHYSICAL THERAPY | Facility: HOSPITAL | Age: 87
DRG: 291 | End: 2022-01-01
Attending: INTERNAL MEDICINE
Payer: COMMERCIAL

## 2022-01-01 ENCOUNTER — TELEPHONE (OUTPATIENT)
Dept: INTERNAL MEDICINE | Facility: CLINIC | Age: 87
End: 2022-01-01
Payer: COMMERCIAL

## 2022-01-01 VITALS
DIASTOLIC BLOOD PRESSURE: 59 MMHG | SYSTOLIC BLOOD PRESSURE: 105 MMHG | TEMPERATURE: 97.2 F | BODY MASS INDEX: 11.38 KG/M2 | WEIGHT: 72.5 LBS | OXYGEN SATURATION: 94 % | HEART RATE: 90 BPM | RESPIRATION RATE: 16 BRPM | HEIGHT: 67 IN

## 2022-01-01 DIAGNOSIS — K21.00 GASTROESOPHAGEAL REFLUX DISEASE WITH ESOPHAGITIS WITHOUT HEMORRHAGE: ICD-10-CM

## 2022-01-01 DIAGNOSIS — R05.9 COUGH: ICD-10-CM

## 2022-01-01 DIAGNOSIS — R52 PAIN: ICD-10-CM

## 2022-01-01 DIAGNOSIS — G47.00 INSOMNIA, UNSPECIFIED TYPE: ICD-10-CM

## 2022-01-01 DIAGNOSIS — K11.7 DISTURBANCE OF SALIVARY SECRETION: ICD-10-CM

## 2022-01-01 DIAGNOSIS — I50.23 ACUTE ON CHRONIC SYSTOLIC CONGESTIVE HEART FAILURE (H): Primary | ICD-10-CM

## 2022-01-01 DIAGNOSIS — F41.9 ANXIETY: ICD-10-CM

## 2022-01-01 DIAGNOSIS — Z71.89 OTHER SPECIFIED COUNSELING: ICD-10-CM

## 2022-01-01 DIAGNOSIS — I42.0 DILATED CARDIOMYOPATHY (H): ICD-10-CM

## 2022-01-01 DIAGNOSIS — R11.0 NAUSEA: ICD-10-CM

## 2022-01-01 DIAGNOSIS — K59.00 CONSTIPATION, UNSPECIFIED CONSTIPATION TYPE: ICD-10-CM

## 2022-01-01 DIAGNOSIS — K92.1 MELENA: ICD-10-CM

## 2022-01-01 DIAGNOSIS — I48.0 PAROXYSMAL ATRIAL FIBRILLATION (H): ICD-10-CM

## 2022-01-01 LAB
ALBUMIN SERPL-MCNC: 3 G/DL (ref 3.5–5)
ALP SERPL-CCNC: 73 U/L (ref 45–120)
ALT SERPL W P-5'-P-CCNC: 20 U/L (ref 0–45)
ANION GAP SERPL CALCULATED.3IONS-SCNC: 12 MMOL/L (ref 5–18)
ANION GAP SERPL CALCULATED.3IONS-SCNC: 13 MMOL/L (ref 5–18)
ANION GAP SERPL CALCULATED.3IONS-SCNC: 14 MMOL/L (ref 5–18)
ANION GAP SERPL CALCULATED.3IONS-SCNC: 9 MMOL/L (ref 5–18)
AST SERPL W P-5'-P-CCNC: 24 U/L (ref 0–40)
ATRIAL RATE - MUSE: 82 BPM
BASOPHILS # BLD AUTO: 0.1 10E3/UL (ref 0–0.2)
BASOPHILS NFR BLD AUTO: 1 %
BILIRUB SERPL-MCNC: 0.3 MG/DL (ref 0–1)
BNP SERPL-MCNC: 4354 PG/ML (ref 0–167)
BUN SERPL-MCNC: 56 MG/DL (ref 8–28)
BUN SERPL-MCNC: 58 MG/DL (ref 8–28)
BUN SERPL-MCNC: 60 MG/DL (ref 8–28)
BUN SERPL-MCNC: 68 MG/DL (ref 8–28)
BUN SERPL-MCNC: 68 MG/DL (ref 8–28)
BUN SERPL-MCNC: 69 MG/DL (ref 8–28)
CALCIUM SERPL-MCNC: 8.5 MG/DL (ref 8.5–10.5)
CALCIUM SERPL-MCNC: 8.7 MG/DL (ref 8.5–10.5)
CALCIUM SERPL-MCNC: 8.8 MG/DL (ref 8.5–10.5)
CALCIUM SERPL-MCNC: 9 MG/DL (ref 8.5–10.5)
CHLORIDE BLD-SCNC: 100 MMOL/L (ref 98–107)
CHLORIDE BLD-SCNC: 104 MMOL/L (ref 98–107)
CHLORIDE BLD-SCNC: 105 MMOL/L (ref 98–107)
CHLORIDE BLD-SCNC: 98 MMOL/L (ref 98–107)
CHLORIDE BLD-SCNC: 98 MMOL/L (ref 98–107)
CHLORIDE BLD-SCNC: 99 MMOL/L (ref 98–107)
CO2 SERPL-SCNC: 20 MMOL/L (ref 22–31)
CO2 SERPL-SCNC: 21 MMOL/L (ref 22–31)
CO2 SERPL-SCNC: 24 MMOL/L (ref 22–31)
CO2 SERPL-SCNC: 26 MMOL/L (ref 22–31)
CO2 SERPL-SCNC: 28 MMOL/L (ref 22–31)
CO2 SERPL-SCNC: 31 MMOL/L (ref 22–31)
CREAT SERPL-MCNC: 1.49 MG/DL (ref 0.6–1.1)
CREAT SERPL-MCNC: 1.5 MG/DL (ref 0.6–1.1)
CREAT SERPL-MCNC: 1.53 MG/DL (ref 0.6–1.1)
CREAT SERPL-MCNC: 1.62 MG/DL (ref 0.6–1.1)
CREAT SERPL-MCNC: 1.72 MG/DL (ref 0.6–1.1)
CREAT SERPL-MCNC: 1.73 MG/DL (ref 0.6–1.1)
DIASTOLIC BLOOD PRESSURE - MUSE: 66 MMHG
EOSINOPHIL # BLD AUTO: 0 10E3/UL (ref 0–0.7)
EOSINOPHIL NFR BLD AUTO: 0 %
ERYTHROCYTE [DISTWIDTH] IN BLOOD BY AUTOMATED COUNT: 13.7 % (ref 10–15)
FLUAV RNA SPEC QL NAA+PROBE: NEGATIVE
FLUBV RNA RESP QL NAA+PROBE: NEGATIVE
GFR SERPL CREATININE-BSD FRML MDRD: 26 ML/MIN/1.73M2
GFR SERPL CREATININE-BSD FRML MDRD: 27 ML/MIN/1.73M2
GFR SERPL CREATININE-BSD FRML MDRD: 29 ML/MIN/1.73M2
GFR SERPL CREATININE-BSD FRML MDRD: 31 ML/MIN/1.73M2
GFR SERPL CREATININE-BSD FRML MDRD: 31 ML/MIN/1.73M2
GFR SERPL CREATININE-BSD FRML MDRD: 32 ML/MIN/1.73M2
GLUCOSE BLD-MCNC: 113 MG/DL (ref 70–125)
GLUCOSE BLD-MCNC: 72 MG/DL (ref 70–125)
GLUCOSE BLD-MCNC: 84 MG/DL (ref 70–125)
GLUCOSE BLD-MCNC: 87 MG/DL (ref 70–125)
GLUCOSE BLD-MCNC: 89 MG/DL (ref 70–125)
GLUCOSE BLD-MCNC: 92 MG/DL (ref 70–125)
HCT VFR BLD AUTO: 39.4 % (ref 35–47)
HGB BLD-MCNC: 12.2 G/DL (ref 11.7–15.7)
HOLD SPECIMEN: NORMAL
IMM GRANULOCYTES # BLD: 0 10E3/UL
IMM GRANULOCYTES NFR BLD: 0 %
INTERPRETATION ECG - MUSE: NORMAL
LVEF ECHO: NORMAL
LYMPHOCYTES # BLD AUTO: 0.8 10E3/UL (ref 0.8–5.3)
LYMPHOCYTES NFR BLD AUTO: 9 %
MAGNESIUM SERPL-MCNC: 2 MG/DL (ref 1.8–2.6)
MAGNESIUM SERPL-MCNC: 2.2 MG/DL (ref 1.8–2.6)
MAGNESIUM SERPL-MCNC: 2.4 MG/DL (ref 1.8–2.6)
MCH RBC QN AUTO: 31.2 PG (ref 26.5–33)
MCHC RBC AUTO-ENTMCNC: 31 G/DL (ref 31.5–36.5)
MCV RBC AUTO: 101 FL (ref 78–100)
MONOCYTES # BLD AUTO: 0.6 10E3/UL (ref 0–1.3)
MONOCYTES NFR BLD AUTO: 7 %
NEUTROPHILS # BLD AUTO: 7.4 10E3/UL (ref 1.6–8.3)
NEUTROPHILS NFR BLD AUTO: 83 %
NRBC # BLD AUTO: 0 10E3/UL
NRBC BLD AUTO-RTO: 0 /100
P AXIS - MUSE: 76 DEGREES
PLATELET # BLD AUTO: 297 10E3/UL (ref 150–450)
POTASSIUM BLD-SCNC: 3.9 MMOL/L (ref 3.5–5)
POTASSIUM BLD-SCNC: 4 MMOL/L (ref 3.5–5)
POTASSIUM BLD-SCNC: 4.1 MMOL/L (ref 3.5–5)
POTASSIUM BLD-SCNC: 4.9 MMOL/L (ref 3.5–5)
POTASSIUM BLD-SCNC: 5.2 MMOL/L (ref 3.5–5)
POTASSIUM BLD-SCNC: 5.6 MMOL/L (ref 3.5–5)
PR INTERVAL - MUSE: 172 MS
PROT SERPL-MCNC: 6.8 G/DL (ref 6–8)
QRS DURATION - MUSE: 158 MS
QT - MUSE: 420 MS
QTC - MUSE: 490 MS
R AXIS - MUSE: -6 DEGREES
RBC # BLD AUTO: 3.91 10E6/UL (ref 3.8–5.2)
SARS-COV-2 RNA RESP QL NAA+PROBE: NEGATIVE
SODIUM SERPL-SCNC: 136 MMOL/L (ref 136–145)
SODIUM SERPL-SCNC: 138 MMOL/L (ref 136–145)
SODIUM SERPL-SCNC: 139 MMOL/L (ref 136–145)
SYSTOLIC BLOOD PRESSURE - MUSE: 142 MMHG
T AXIS - MUSE: 143 DEGREES
TROPONIN I SERPL-MCNC: 0.04 NG/ML (ref 0–0.29)
VENTRICULAR RATE- MUSE: 82 BPM
WBC # BLD AUTO: 8.9 10E3/UL (ref 4–11)

## 2022-01-01 PROCEDURE — 36415 COLL VENOUS BLD VENIPUNCTURE: CPT | Performed by: INTERNAL MEDICINE

## 2022-01-01 PROCEDURE — 250N000011 HC RX IP 250 OP 636: Performed by: INTERNAL MEDICINE

## 2022-01-01 PROCEDURE — 83880 ASSAY OF NATRIURETIC PEPTIDE: CPT | Performed by: PHYSICIAN ASSISTANT

## 2022-01-01 PROCEDURE — 99285 EMERGENCY DEPT VISIT HI MDM: CPT | Mod: 25

## 2022-01-01 PROCEDURE — 250N000013 HC RX MED GY IP 250 OP 250 PS 637: Performed by: INTERNAL MEDICINE

## 2022-01-01 PROCEDURE — 83735 ASSAY OF MAGNESIUM: CPT | Performed by: INTERNAL MEDICINE

## 2022-01-01 PROCEDURE — 36415 COLL VENOUS BLD VENIPUNCTURE: CPT | Performed by: STUDENT IN AN ORGANIZED HEALTH CARE EDUCATION/TRAINING PROGRAM

## 2022-01-01 PROCEDURE — 93005 ELECTROCARDIOGRAM TRACING: CPT | Performed by: STUDENT IN AN ORGANIZED HEALTH CARE EDUCATION/TRAINING PROGRAM

## 2022-01-01 PROCEDURE — 97116 GAIT TRAINING THERAPY: CPT | Mod: GP

## 2022-01-01 PROCEDURE — 80048 BASIC METABOLIC PNL TOTAL CA: CPT | Performed by: INTERNAL MEDICINE

## 2022-01-01 PROCEDURE — 99239 HOSP IP/OBS DSCHRG MGMT >30: CPT | Mod: GW | Performed by: INTERNAL MEDICINE

## 2022-01-01 PROCEDURE — 96366 THER/PROPH/DIAG IV INF ADDON: CPT

## 2022-01-01 PROCEDURE — 80053 COMPREHEN METABOLIC PANEL: CPT | Performed by: PHYSICIAN ASSISTANT

## 2022-01-01 PROCEDURE — 97162 PT EVAL MOD COMPLEX 30 MIN: CPT | Mod: GP

## 2022-01-01 PROCEDURE — 99232 SBSQ HOSP IP/OBS MODERATE 35: CPT | Performed by: INTERNAL MEDICINE

## 2022-01-01 PROCEDURE — 99220 PR INITIAL OBSERVATION CARE,LEVEL III: CPT | Performed by: INTERNAL MEDICINE

## 2022-01-01 PROCEDURE — C9803 HOPD COVID-19 SPEC COLLECT: HCPCS

## 2022-01-01 PROCEDURE — 96376 TX/PRO/DX INJ SAME DRUG ADON: CPT

## 2022-01-01 PROCEDURE — G0378 HOSPITAL OBSERVATION PER HR: HCPCS

## 2022-01-01 PROCEDURE — 250N000013 HC RX MED GY IP 250 OP 250 PS 637

## 2022-01-01 PROCEDURE — 93005 ELECTROCARDIOGRAM TRACING: CPT | Performed by: EMERGENCY MEDICINE

## 2022-01-01 PROCEDURE — 84484 ASSAY OF TROPONIN QUANT: CPT | Performed by: PHYSICIAN ASSISTANT

## 2022-01-01 PROCEDURE — 120N000004 HC R&B MS OVERFLOW

## 2022-01-01 PROCEDURE — 93306 TTE W/DOPPLER COMPLETE: CPT

## 2022-01-01 PROCEDURE — 99225 PR SUBSEQUENT OBSERVATION CARE,LEVEL II: CPT | Performed by: INTERNAL MEDICINE

## 2022-01-01 PROCEDURE — 93306 TTE W/DOPPLER COMPLETE: CPT | Mod: 26 | Performed by: INTERNAL MEDICINE

## 2022-01-01 PROCEDURE — 96365 THER/PROPH/DIAG IV INF INIT: CPT

## 2022-01-01 PROCEDURE — 87636 SARSCOV2 & INF A&B AMP PRB: CPT | Performed by: PHYSICIAN ASSISTANT

## 2022-01-01 PROCEDURE — 85025 COMPLETE CBC W/AUTO DIFF WBC: CPT | Performed by: PHYSICIAN ASSISTANT

## 2022-01-01 PROCEDURE — 96374 THER/PROPH/DIAG INJ IV PUSH: CPT

## 2022-01-01 PROCEDURE — 250N000011 HC RX IP 250 OP 636: Performed by: PHYSICIAN ASSISTANT

## 2022-01-01 PROCEDURE — 258N000003 HC RX IP 258 OP 636: Performed by: INTERNAL MEDICINE

## 2022-01-01 PROCEDURE — 99223 1ST HOSP IP/OBS HIGH 75: CPT | Performed by: INTERNAL MEDICINE

## 2022-01-01 PROCEDURE — 71045 X-RAY EXAM CHEST 1 VIEW: CPT

## 2022-01-01 RX ORDER — HYDRALAZINE HYDROCHLORIDE 25 MG/1
25 TABLET, FILM COATED ORAL 2 TIMES DAILY
Status: DISCONTINUED | OUTPATIENT
Start: 2022-01-01 | End: 2022-01-01

## 2022-01-01 RX ORDER — BENZONATATE 100 MG/1
100 CAPSULE ORAL 3 TIMES DAILY PRN
Status: DISCONTINUED | OUTPATIENT
Start: 2022-01-01 | End: 2022-01-01 | Stop reason: HOSPADM

## 2022-01-01 RX ORDER — METOPROLOL TARTRATE 25 MG/1
25 TABLET, FILM COATED ORAL 2 TIMES DAILY
Status: DISCONTINUED | OUTPATIENT
Start: 2022-01-01 | End: 2022-01-01

## 2022-01-01 RX ORDER — PANTOPRAZOLE SODIUM 40 MG/1
40 TABLET, DELAYED RELEASE ORAL
Status: DISCONTINUED | OUTPATIENT
Start: 2022-01-01 | End: 2022-01-01 | Stop reason: HOSPADM

## 2022-01-01 RX ORDER — BENZONATATE 100 MG/1
100 CAPSULE ORAL 3 TIMES DAILY PRN
Qty: 30 CAPSULE | Refills: 0 | Status: SHIPPED | OUTPATIENT
Start: 2022-01-01

## 2022-01-01 RX ORDER — ISOSORBIDE DINITRATE 10 MG/1
10 TABLET ORAL
Status: DISCONTINUED | OUTPATIENT
Start: 2022-01-01 | End: 2022-01-01 | Stop reason: HOSPADM

## 2022-01-01 RX ORDER — BISACODYL 10 MG
10 SUPPOSITORY, RECTAL RECTAL DAILY PRN
Qty: 2 SUPPOSITORY | Refills: 11 | Status: SHIPPED | OUTPATIENT
Start: 2022-01-01

## 2022-01-01 RX ORDER — METOPROLOL SUCCINATE 25 MG/1
25 TABLET, EXTENDED RELEASE ORAL DAILY
Status: DISCONTINUED | OUTPATIENT
Start: 2022-01-01 | End: 2022-01-01

## 2022-01-01 RX ORDER — SENNOSIDES A AND B 8.6 MG/1
1 TABLET, FILM COATED ORAL DAILY PRN
Status: ON HOLD | COMMUNITY
End: 2022-01-01

## 2022-01-01 RX ORDER — METOPROLOL SUCCINATE 25 MG/1
50 TABLET, EXTENDED RELEASE ORAL DAILY
Status: DISCONTINUED | OUTPATIENT
Start: 2022-01-01 | End: 2022-01-01

## 2022-01-01 RX ORDER — ONDANSETRON 2 MG/ML
4 INJECTION INTRAMUSCULAR; INTRAVENOUS EVERY 6 HOURS PRN
Status: DISCONTINUED | OUTPATIENT
Start: 2022-01-01 | End: 2022-01-01 | Stop reason: HOSPADM

## 2022-01-01 RX ORDER — FUROSEMIDE 10 MG/ML
40 INJECTION INTRAMUSCULAR; INTRAVENOUS ONCE
Status: COMPLETED | OUTPATIENT
Start: 2022-01-01 | End: 2022-01-01

## 2022-01-01 RX ORDER — FUROSEMIDE 40 MG
40 TABLET ORAL
Qty: 60 TABLET | Refills: 0 | Status: SHIPPED | OUTPATIENT
Start: 2022-01-01

## 2022-01-01 RX ORDER — METOPROLOL SUCCINATE 25 MG/1
25 TABLET, EXTENDED RELEASE ORAL DAILY
Qty: 30 TABLET | Refills: 0 | Status: SHIPPED | OUTPATIENT
Start: 2022-01-01

## 2022-01-01 RX ORDER — LANOLIN ALCOHOL/MO/W.PET/CERES
3 CREAM (GRAM) TOPICAL
Qty: 10 TABLET | Refills: 0 | Status: SHIPPED | OUTPATIENT
Start: 2022-01-01

## 2022-01-01 RX ORDER — METOPROLOL SUCCINATE 25 MG/1
25 TABLET, EXTENDED RELEASE ORAL DAILY
Status: DISCONTINUED | OUTPATIENT
Start: 2022-01-01 | End: 2022-01-01 | Stop reason: HOSPADM

## 2022-01-01 RX ORDER — ASPIRIN 325 MG
325 TABLET, DELAYED RELEASE (ENTERIC COATED) ORAL DAILY
Qty: 30 TABLET | Refills: 0 | Status: SHIPPED | OUTPATIENT
Start: 2022-01-01

## 2022-01-01 RX ORDER — ONDANSETRON 4 MG/1
4 TABLET, ORALLY DISINTEGRATING ORAL EVERY 6 HOURS PRN
Qty: 30 TABLET | Refills: 0 | Status: SHIPPED | OUTPATIENT
Start: 2022-01-01

## 2022-01-01 RX ORDER — ISOSORBIDE DINITRATE 10 MG/1
10 TABLET ORAL
Qty: 60 TABLET | Refills: 0 | Status: SHIPPED | OUTPATIENT
Start: 2022-01-01 | End: 2022-05-18

## 2022-01-01 RX ORDER — CALCIUM CARBONATE 500 MG/1
1000 TABLET, CHEWABLE ORAL 4 TIMES DAILY PRN
Status: DISCONTINUED | OUTPATIENT
Start: 2022-01-01 | End: 2022-01-01 | Stop reason: HOSPADM

## 2022-01-01 RX ORDER — LANOLIN ALCOHOL/MO/W.PET/CERES
3 CREAM (GRAM) TOPICAL
Status: DISCONTINUED | OUTPATIENT
Start: 2022-01-01 | End: 2022-01-01 | Stop reason: HOSPADM

## 2022-01-01 RX ORDER — FUROSEMIDE 10 MG/ML
40 INJECTION INTRAMUSCULAR; INTRAVENOUS EVERY 8 HOURS
Status: DISCONTINUED | OUTPATIENT
Start: 2022-01-01 | End: 2022-01-01

## 2022-01-01 RX ORDER — ATROPINE SULFATE 10 MG/ML
1 SOLUTION/ DROPS OPHTHALMIC EVERY 4 HOURS PRN
Qty: 5 ML | Refills: 11 | Status: SHIPPED | OUTPATIENT
Start: 2022-01-01

## 2022-01-01 RX ORDER — LORAZEPAM 2 MG/ML
0.25 CONCENTRATE ORAL EVERY 6 HOURS PRN
Qty: 30 ML | Refills: 0 | Status: SHIPPED | OUTPATIENT
Start: 2022-01-01

## 2022-01-01 RX ORDER — ACETAMINOPHEN 325 MG/1
650 TABLET ORAL EVERY 6 HOURS PRN
Status: DISCONTINUED | OUTPATIENT
Start: 2022-01-01 | End: 2022-01-01 | Stop reason: HOSPADM

## 2022-01-01 RX ORDER — HYDRALAZINE HYDROCHLORIDE 25 MG/1
12.5 TABLET, FILM COATED ORAL 2 TIMES DAILY
Qty: 30 TABLET | Refills: 0 | Status: SHIPPED | OUTPATIENT
Start: 2022-01-01

## 2022-01-01 RX ORDER — ONDANSETRON 4 MG/1
4 TABLET, ORALLY DISINTEGRATING ORAL EVERY 6 HOURS PRN
Status: DISCONTINUED | OUTPATIENT
Start: 2022-01-01 | End: 2022-01-01 | Stop reason: HOSPADM

## 2022-01-01 RX ORDER — BENZONATATE 100 MG/1
100 CAPSULE ORAL 3 TIMES DAILY PRN
Status: ON HOLD | COMMUNITY
End: 2022-01-01

## 2022-01-01 RX ORDER — FUROSEMIDE 10 MG/ML
20 INJECTION INTRAMUSCULAR; INTRAVENOUS EVERY 8 HOURS
Status: DISCONTINUED | OUTPATIENT
Start: 2022-01-01 | End: 2022-01-01

## 2022-01-01 RX ORDER — ACETAMINOPHEN 325 MG/1
650 TABLET ORAL EVERY 6 HOURS PRN
Qty: 30 TABLET | Refills: 0 | Status: SHIPPED | OUTPATIENT
Start: 2022-01-01

## 2022-01-01 RX ORDER — ACETAMINOPHEN 650 MG/1
650 SUPPOSITORY RECTAL EVERY 6 HOURS PRN
Status: DISCONTINUED | OUTPATIENT
Start: 2022-01-01 | End: 2022-01-01 | Stop reason: HOSPADM

## 2022-01-01 RX ORDER — FUROSEMIDE 20 MG
40 TABLET ORAL
Status: DISCONTINUED | OUTPATIENT
Start: 2022-01-01 | End: 2022-01-01 | Stop reason: HOSPADM

## 2022-01-01 RX ORDER — CALCIUM CARBONATE 500 MG/1
1 TABLET, CHEWABLE ORAL 4 TIMES DAILY PRN
Qty: 20 TABLET | Refills: 0 | Status: SHIPPED | OUTPATIENT
Start: 2022-01-01

## 2022-01-01 RX ORDER — LANOLIN ALCOHOL/MO/W.PET/CERES
3 CREAM (GRAM) TOPICAL
Status: DISCONTINUED | OUTPATIENT
Start: 2022-01-01 | End: 2022-01-01

## 2022-01-01 RX ORDER — FUROSEMIDE 10 MG/ML
80 INJECTION INTRAMUSCULAR; INTRAVENOUS ONCE
Status: COMPLETED | OUTPATIENT
Start: 2022-01-01 | End: 2022-01-01

## 2022-01-01 RX ADMIN — METOPROLOL SUCCINATE 25 MG: 25 TABLET, EXTENDED RELEASE ORAL at 09:09

## 2022-01-01 RX ADMIN — ISOSORBIDE DINITRATE 10 MG: 10 TABLET ORAL at 08:54

## 2022-01-01 RX ADMIN — ASPIRIN 325 MG: 325 TABLET ORAL at 08:08

## 2022-01-01 RX ADMIN — MELATONIN TAB 3 MG 3 MG: 3 TAB at 23:38

## 2022-01-01 RX ADMIN — ASPIRIN 325 MG: 325 TABLET ORAL at 09:59

## 2022-01-01 RX ADMIN — METOPROLOL SUCCINATE 25 MG: 25 TABLET, EXTENDED RELEASE ORAL at 12:02

## 2022-01-01 RX ADMIN — ISOSORBIDE DINITRATE 10 MG: 10 TABLET ORAL at 08:52

## 2022-01-01 RX ADMIN — ISOSORBIDE DINITRATE 10 MG: 10 TABLET ORAL at 17:38

## 2022-01-01 RX ADMIN — METOPROLOL SUCCINATE 50 MG: 25 TABLET, EXTENDED RELEASE ORAL at 08:54

## 2022-01-01 RX ADMIN — FUROSEMIDE 10 MG/HR: 10 INJECTION, SOLUTION INTRAVENOUS at 09:03

## 2022-01-01 RX ADMIN — ACETAMINOPHEN 650 MG: 325 TABLET ORAL at 03:24

## 2022-01-01 RX ADMIN — FUROSEMIDE 40 MG: 20 TABLET ORAL at 08:03

## 2022-01-01 RX ADMIN — MELATONIN TAB 3 MG 3 MG: 3 TAB at 01:46

## 2022-01-01 RX ADMIN — HYDRALAZINE HYDROCHLORIDE 12.5 MG: 25 TABLET, FILM COATED ORAL at 08:05

## 2022-01-01 RX ADMIN — FUROSEMIDE 40 MG: 10 INJECTION, SOLUTION INTRAVENOUS at 19:32

## 2022-01-01 RX ADMIN — FUROSEMIDE 40 MG: 10 INJECTION, SOLUTION INTRAMUSCULAR; INTRAVENOUS at 15:55

## 2022-01-01 RX ADMIN — METOPROLOL SUCCINATE 25 MG: 25 TABLET, EXTENDED RELEASE ORAL at 08:04

## 2022-01-01 RX ADMIN — HYDRALAZINE HYDROCHLORIDE 12.5 MG: 25 TABLET, FILM COATED ORAL at 21:12

## 2022-01-01 RX ADMIN — HYDRALAZINE HYDROCHLORIDE 12.5 MG: 25 TABLET, FILM COATED ORAL at 09:09

## 2022-01-01 RX ADMIN — PANTOPRAZOLE SODIUM 40 MG: 40 TABLET, DELAYED RELEASE ORAL at 08:04

## 2022-01-01 RX ADMIN — HYDRALAZINE HYDROCHLORIDE 12.5 MG: 25 TABLET, FILM COATED ORAL at 11:26

## 2022-01-01 RX ADMIN — METOPROLOL TARTRATE 25 MG: 25 TABLET, FILM COATED ORAL at 22:06

## 2022-01-01 RX ADMIN — FUROSEMIDE 40 MG: 10 INJECTION, SOLUTION INTRAVENOUS at 10:04

## 2022-01-01 RX ADMIN — PANTOPRAZOLE SODIUM 40 MG: 40 TABLET, DELAYED RELEASE ORAL at 08:51

## 2022-01-01 RX ADMIN — FUROSEMIDE 40 MG: 10 INJECTION, SOLUTION INTRAVENOUS at 03:07

## 2022-01-01 RX ADMIN — FUROSEMIDE 20 MG: 10 INJECTION, SOLUTION INTRAMUSCULAR; INTRAVENOUS at 22:06

## 2022-01-01 RX ADMIN — PANTOPRAZOLE SODIUM 40 MG: 40 TABLET, DELAYED RELEASE ORAL at 06:31

## 2022-01-01 RX ADMIN — HYDRALAZINE HYDROCHLORIDE 12.5 MG: 25 TABLET, FILM COATED ORAL at 08:52

## 2022-01-01 RX ADMIN — HYDRALAZINE HYDROCHLORIDE 12.5 MG: 25 TABLET, FILM COATED ORAL at 08:49

## 2022-01-01 RX ADMIN — METOPROLOL SUCCINATE 25 MG: 25 TABLET, EXTENDED RELEASE ORAL at 08:52

## 2022-01-01 RX ADMIN — FUROSEMIDE 40 MG: 20 TABLET ORAL at 09:09

## 2022-01-01 RX ADMIN — MELATONIN TAB 3 MG 3 MG: 3 TAB at 21:41

## 2022-01-01 RX ADMIN — HYDRALAZINE HYDROCHLORIDE 12.5 MG: 25 TABLET, FILM COATED ORAL at 23:38

## 2022-01-01 RX ADMIN — FUROSEMIDE 40 MG: 20 TABLET ORAL at 17:07

## 2022-01-01 RX ADMIN — HYDRALAZINE HYDROCHLORIDE 12.5 MG: 25 TABLET, FILM COATED ORAL at 21:54

## 2022-01-01 RX ADMIN — ASPIRIN 325 MG: 325 TABLET ORAL at 09:18

## 2022-01-01 RX ADMIN — FUROSEMIDE 40 MG: 20 TABLET ORAL at 08:52

## 2022-01-01 RX ADMIN — FUROSEMIDE 80 MG: 10 INJECTION, SOLUTION INTRAVENOUS at 08:48

## 2022-01-01 RX ADMIN — ISOSORBIDE DINITRATE 10 MG: 10 TABLET ORAL at 08:03

## 2022-01-01 RX ADMIN — ISOSORBIDE DINITRATE 10 MG: 10 TABLET ORAL at 17:07

## 2022-01-01 RX ADMIN — ASPIRIN 325 MG: 325 TABLET ORAL at 08:53

## 2022-01-01 RX ADMIN — ACETAMINOPHEN 650 MG: 325 TABLET ORAL at 11:56

## 2022-01-01 RX ADMIN — PANTOPRAZOLE SODIUM 40 MG: 40 TABLET, DELAYED RELEASE ORAL at 06:23

## 2022-01-01 RX ADMIN — ISOSORBIDE DINITRATE 10 MG: 10 TABLET ORAL at 11:52

## 2022-01-01 RX ADMIN — ISOSORBIDE DINITRATE 10 MG: 10 TABLET ORAL at 09:10

## 2022-01-01 RX ADMIN — ASPIRIN 325 MG: 325 TABLET ORAL at 08:58

## 2022-01-01 RX ADMIN — FUROSEMIDE 20 MG: 10 INJECTION, SOLUTION INTRAMUSCULAR; INTRAVENOUS at 06:27

## 2022-01-01 ASSESSMENT — ACTIVITIES OF DAILY LIVING (ADL)
TRANSFERRING: 1-->ASSISTANCE (EQUIPMENT/PERSON) NEEDED
ADLS_ACUITY_SCORE: 13
ADLS_ACUITY_SCORE: 12
HEARING_DIFFICULTY_OR_DEAF: NO
ADLS_ACUITY_SCORE: 12
ADLS_ACUITY_SCORE: 12
ADLS_ACUITY_SCORE: 13
ADLS_ACUITY_SCORE: 12
DIFFICULTY_COMMUNICATING: NO
ADLS_ACUITY_SCORE: 12
ADLS_ACUITY_SCORE: 13
ADLS_ACUITY_SCORE: 13
ADLS_ACUITY_SCORE: 12
ADLS_ACUITY_SCORE: 14
ADLS_ACUITY_SCORE: 12
ADLS_ACUITY_SCORE: 12
WEAR_GLASSES_OR_BLIND: YES
ADLS_ACUITY_SCORE: 13
ADLS_ACUITY_SCORE: 12
ADLS_ACUITY_SCORE: 14
ADLS_ACUITY_SCORE: 15
ADLS_ACUITY_SCORE: 13
NUMBER_OF_TIMES_PATIENT_HAS_FALLEN_WITHIN_LAST_SIX_MONTHS: 0
DRESSING/BATHING_DIFFICULTY: NO
ADLS_ACUITY_SCORE: 13
ADLS_ACUITY_SCORE: 14
ADLS_ACUITY_SCORE: 12
ADLS_ACUITY_SCORE: 13
VISION_MANAGEMENT: GLASSES
EQUIPMENT_CURRENTLY_USED_AT_HOME: WALKER, STANDARD
WALKING_OR_CLIMBING_STAIRS_DIFFICULTY: YES
ADLS_ACUITY_SCORE: 13
ADLS_ACUITY_SCORE: 12
WALKING_OR_CLIMBING_STAIRS: AMBULATION DIFFICULTY, REQUIRES EQUIPMENT
ADLS_ACUITY_SCORE: 12
CHANGE_IN_FUNCTIONAL_STATUS_SINCE_ONSET_OF_CURRENT_ILLNESS/INJURY: NO
ADLS_ACUITY_SCORE: 12
TRANSFERRING: 1-->ASSISTANCE (EQUIPMENT/PERSON) NEEDED (NOT DEVELOPMENTALLY APPROPRIATE)
ADLS_ACUITY_SCORE: 12
ADLS_ACUITY_SCORE: 13
CONCENTRATING,_REMEMBERING_OR_MAKING_DECISIONS_DIFFICULTY: NO
ADLS_ACUITY_SCORE: 12
ADLS_ACUITY_SCORE: 13
ADLS_ACUITY_SCORE: 12
DIFFICULTY_EATING/SWALLOWING: NO
ADLS_ACUITY_SCORE: 13
ADLS_ACUITY_SCORE: 12
DOING_ERRANDS_INDEPENDENTLY_DIFFICULTY: YES
ADLS_ACUITY_SCORE: 12
ADLS_ACUITY_SCORE: 13
ADLS_ACUITY_SCORE: 12
ADLS_ACUITY_SCORE: 13
ADLS_ACUITY_SCORE: 12
ADLS_ACUITY_SCORE: 13
ADLS_ACUITY_SCORE: 13
TOILETING_ISSUES: NO
ADLS_ACUITY_SCORE: 12
FALL_HISTORY_WITHIN_LAST_SIX_MONTHS: NO
ADLS_ACUITY_SCORE: 13
ADLS_ACUITY_SCORE: 14
ADLS_ACUITY_SCORE: 12

## 2022-01-01 ASSESSMENT — ENCOUNTER SYMPTOMS
ABDOMINAL PAIN: 0
CHEST TIGHTNESS: 0
DIARRHEA: 0
WOUND: 0
TROUBLE SWALLOWING: 0
FREQUENCY: 0
WEAKNESS: 0
HEMATURIA: 0
DYSURIA: 0
BACK PAIN: 0
CHILLS: 0
EYE DISCHARGE: 0
FEVER: 0
COUGH: 1
NAUSEA: 0
SORE THROAT: 0
NUMBNESS: 0
HEADACHES: 0
NECK PAIN: 0
SHORTNESS OF BREATH: 0
VOMITING: 0

## 2022-03-13 NOTE — ANESTHESIA PROCEDURE NOTES
Spinal Block    Patient location during procedure: OR  Start time: 6/28/2019 4:47 PM  End time: 6/28/2019 4:53 PM  Reason for block: primary anesthetic    Staffing:  Performing  Anesthesiologist: Bart Turner MD    Preanesthetic Checklist  Completed: patient identified, risks, benefits, and alternatives discussed, timeout performed, consent obtained, airway assessed, oxygen available, suction available, emergency drugs available and hand hygiene performed  Spinal Block  Patient position: right lateral decubitus  Prep: ChloraPrep  Patient monitoring: heart rate, cardiac monitor, continuous pulse ox and blood pressure  Approach: midline  Location: L3-4  Injection technique: single-shot  Needle type: pencil-tip   Needle gauge: 24 G    Assessment  Sensory level: not assessed at this time.             DVT ppx: on DOAC, as above DVT ppx: on DOAC, as above

## 2022-04-01 NOTE — TELEPHONE ENCOUNTER
Judi with [facility] calling to relay patient update for Dr. Sosa.    Patient complained last night of sore throat, patient reports when she gets a cold she usually loses her voice and gets sore throat.    Patient's vitals temp 97.3, O2 96%, lungs clear, negative result on Covid antigen test.    Advised patient to increase liquids, and try warm team to sooth throat.     Will continue to monitor patient.  Judi can be reached at  with any additional questions.

## 2022-04-02 NOTE — TELEPHONE ENCOUNTER
Reviewed and noted.    Douglas Sosa MD  General Internal Medicine  [facility]  4/1/2022, 9:09 PM

## 2022-04-14 PROBLEM — I50.23 ACUTE ON CHRONIC SYSTOLIC CONGESTIVE HEART FAILURE (H): Status: ACTIVE | Noted: 2022-01-01

## 2022-04-14 PROBLEM — K21.00 GASTROESOPHAGEAL REFLUX DISEASE WITH ESOPHAGITIS: Status: ACTIVE | Noted: 2022-01-01

## 2022-04-14 PROBLEM — N18.9 ACUTE KIDNEY INJURY SUPERIMPOSED ON CKD (H): Status: ACTIVE | Noted: 2022-01-01

## 2022-04-14 PROBLEM — N17.9 ACUTE KIDNEY INJURY SUPERIMPOSED ON CKD (H): Status: ACTIVE | Noted: 2022-01-01

## 2022-04-14 PROBLEM — I42.0 DILATED CARDIOMYOPATHY (H): Status: ACTIVE | Noted: 2019-03-10

## 2022-04-14 NOTE — TELEPHONE ENCOUNTER
Nurse Triage SBAR    Is this a 2nd Level Triage? YES, LICENSED PRACTITIONER REVIEW IS REQUIRED  Please call Alejandra BOJORQUEZ from [place] ph. [phone].     Situation:     Alejandra BOJORQUEZ calling from [place] reporting  patient was seen at [facility] on 4/3/22 for cough and congestion.     Requests orders for chest x-ray today.    Patient returned with Lasix dose increased x 3 days and is now back on baseline of 40 mg. In addition patient was prescribed Tessalon for cough.         Background:     Patient seen in [facility] on 4/3/22    Assessment:     Stephanie BOJORQUEZ reporting she saw patient today for follow up.    Reporting noticing only mild improvement.     Lungs were initially clear. Stating today wheezing in right upper lobe and diminished in lower bases.    Ongoing cough.    Reporting patient seems weaker then baseline.    Vital Signs:    B/P 113/71     76 pulse     Respirations 35-36  described as shallow and rapid    Temp 96.8     O 2 sats 92% room air.     Patient is eating/decreased. Stating they have attempted to increase fluids.    Stephanie spoke with nephew and it was a struggle for patient to go to Urgent Care.    Stephanie stating they are able to do chest x-ray there with orders.    Requesting orders Faxed to .      Protocol Recommended Disposition:   No disposition on file.    Recommendation:     Please advise on chest x-ray orders.     Routed to provider    Does the patient meet one of the following criteria for ADS visit consideration? 16+ years old, with an [facility] PCP     TIP  Providers, please consider if this condition is appropriate for management at one of our Acute and Diagnostic Services sites.     If patient is a good candidate, please use dotphrase <dot>triageresponse and select Refer to ADS to document.    Reason for Disposition    [1] Follow-up call from patient regarding patient's clinical status AND [2] information urgent    Additional Information    Negative: Lab calling with strep throat  test results and triager can call in prescription    Negative: Lab calling with urinalysis test results and triager can call in prescription    Negative: Medication questions    Negative: ED call to PCP    Negative: Physician call to PCP    Negative: Call about patient who is currently hospitalized    Negative: Lab or radiology calling with CRITICAL test results    Negative: [1] Prescription not at pharmacy AND [2] was prescribed today by PCP    Protocols used: PCP CALL - NO TRIAGE-A-

## 2022-04-14 NOTE — ED PROVIDER NOTES
[facility] EMERGENCY DEPARTMENT  ATTENDING PHYSICIAN SUPERVISION NOTE    MRN: [id number]    FINAL IMPRESSION     1. Acute on chronic systolic congestive heart failure (H)          ED COURSE & MDM     ED Course as of 04/1935   Thu Apr 14, 2022   1610 4:10 PM Patient case discussed with the PA.   1902 7:02 PM Spoke with Dr. Ruby of cardiology.   1902 7:17 PM Patient reevaluated.   1917 7:17 PM Hospitalist rojas.    1933 7:33 PM Spoke with hospitalist Dr. Loaiza.       Patient presented for cough. Initial vital signs reassuring.  Patient is having acute on chronic CHF.  She expressed a desire to avoid admission so  ordered a stat echo which shows somewhat decreased EF at 10 to 20% compared to 20% in 2019, as well as severe mitral regurgitation compared to moderate to severe MR in 2019.  Dr. Jhaveri of cardiology notes that the most significant change is worsening and aortic stenosis, and this would also be the only change amenable to intervention.  I discussed these findings with the patient and she does not want to pursue any procedures at this time.  We discussed admission for IV diuresis and she agrees to stay overnight. Patient admitted for further management and observation.    PA care provided under my supervision. I have reviewed and agree with their documented HPI, ROS, and exam unless otherwise noted. Please see their note for full MDM.    I personally saw the patient and performed a substantive portion of the visit including all aspects of the medical decision making.       =================================================================    BRIEF HPI     Pinky Duggan is a 89 year old female with acute on chronic CHF. Pt is DNR/DNI and does not want aggressive treatment. Cardiology recommends an echo and if there is no major change she can go home with increased diuretic dosing. She only has a cough and does not have chest pain or  dyspnea.    ROS  All other ROS negative.    Problem list, medications, allergies, PMH, PSH, family history, and social history reviewed and updated as able in Epic.      PHYSICAL EXAM     Vitals:    04/14/22 1815 04/14/22 1830 04/14/22 1845 04/14/22 1900   BP: 127/63 136/88 (!) 148/77 (!) 140/66   Pulse: 86 86 83 86   Resp: 26 28 28 29   Temp:       SpO2: 96% 90% 91% 90%   Weight:       Height:            Constitutional: Alert, no acute distress.  Eyes: Sclera anicteric, EOMI.  Pulm: Non-labored respirations.  Neuro: A/O x3. Normal speech. No focal deficits.  Skin: Warm and dry, no rash.  Psych: Cooperative, able to follow commands. Intact attention.      TESTING   All testing reviewed and interpreted.    EKG  NSR with no ectopy. No significant ST or T wave changes. Significant LVH causing QRS widening. Similar to previous. Please see signed document for full description.    LABS  Labs Ordered and Resulted from Time of ED Arrival to Time of ED Departure   COMPREHENSIVE METABOLIC PANEL - Abnormal       Result Value    Sodium 139      Potassium 5.6 (*)     Chloride 105      Carbon Dioxide (CO2) 20 (*)     Anion Gap 14      Urea Nitrogen 56 (*)     Creatinine 1.72 (*)     Calcium 8.8      Glucose 113      Alkaline Phosphatase 73      AST 24      ALT 20      Protein Total 6.8      Albumin 3.0 (*)     Bilirubin Total 0.3      GFR Estimate 27 (*)    B-TYPE NATRIURETIC PEPTIDE ( EAST ONLY) - Abnormal    BNP 4,354 (*)    CBC WITH PLATELETS AND DIFFERENTIAL - Abnormal    WBC Count 8.9      RBC Count 3.91      Hemoglobin 12.2      Hematocrit 39.4       (*)     MCH 31.2      MCHC 31.0 (*)     RDW 13.7      Platelet Count 297      % Neutrophils 83      % Lymphocytes 9      % Monocytes 7      % Eosinophils 0      % Basophils 1      % Immature Granulocytes 0      NRBCs per 100 WBC 0      Absolute Neutrophils 7.4      Absolute Lymphocytes 0.8      Absolute Monocytes 0.6      Absolute Eosinophils 0.0      Absolute  Basophils 0.1      Absolute Immature Granulocytes 0.0      Absolute NRBCs 0.0     TROPONIN I - Normal    Troponin I 0.04     INFLUENZA A/B & SARS-COV2 PCR MULTIPLEX - Normal    Influenza A PCR Negative      Influenza B PCR Negative      SARS CoV2 PCR Negative         IMAGING  XR Chest Port 1 View   Final Result   IMPRESSION: Lungs are hyperexpanded, unchanged.        Subtle spiculated density in the left upper lobe, lateral to the aortic knob. There is no central mass. If needed, chest CT can further evaluate this      No signs of pneumonia or failure. Heart and pulmonary vascularity are normal. Severe S-shaped thoracolumbar scoliosis      Echocardiogram Complete    (Results Pending)         Godfrey NINO Asp, MD Asp, MD Godfrey  04/1935

## 2022-04-14 NOTE — ED NOTES
Bed: JNED-04  Expected date: 4/14/22  Expected time:   Means of arrival: Ambulance  Comments:  Yg Navarrete F  Cough/SOB

## 2022-04-14 NOTE — TELEPHONE ENCOUNTER
X-ray was done at [facility] and this was not remarkable.    Reviewed chart and no blood work done at [facility].    OXYGEN level was low at [facility].    Called 4/1/22 and had some viral symptoms.    I am recommending that the patient be evaluated in the emergency room (ER) as she is not improving.  Recommend [facility] emergency room (ER).    Douglas Sosa MD  General Internal Medicine  [facility]  4/14/2022, 11:00 AM

## 2022-04-14 NOTE — ED TRIAGE NOTES
"Patient to ER via medics, per medic report patient lives at [facility], has been having SOB with cough for 2 weeks, went to clinic and Lasix was increased from 40mg to 60mg, has been returned to 40mg. Today sent to ER for continual cough. Non productive. Medics gave her a ALbuteral atrovent neb in route. sats at nursing home were 92% RA, 4L started per medics and  mg given    Patient alert, pleasant, denies pain, or SOB, \"I just have this cough and my throat hurts\". Patient states she has been vaccinated from Covid, does not know if she has been tested  "

## 2022-04-14 NOTE — TELEPHONE ENCOUNTER
Placed follow up call to Stephanie BOJORQUEZ.     Reviewed Dr Sosa note below.    Stephanie stating she will contact patient's nephew to update and will send patient to [facility] ED.    Provider Recommendation Follow Up:   Reached patient/caregiver. Informed of provider's recommendations. Patient verbalized understanding and agrees with the plan.       }Rachelle Luna RN  [facility]

## 2022-04-14 NOTE — TELEPHONE ENCOUNTER
Stephanie from [facility] is calling and is requesting to speak with [facility] direct regarding patient.    COVID 19 Nurse Triage Plan/Patient Instructions    Please be aware that novel coronavirus (COVID-19) may be circulating in the community. If you develop symptoms such as fever, cough, or SOB or if you have concerns about the presence of another infection including coronavirus (COVID-19), please contact your health care provider or visit [email or web address].     Disposition/Instructions    Home care recommended. Follow home care protocol based instructions.    Thank you for taking steps to prevent the spread of this virus.  o Limit your contact with others.  o Wear a simple mask to cover your cough.  o Wash your hands well and often.    Resources    [facility]: About COVID-19: [email or web address]    CDC: What to Do If You're Sick: www.cdc.gov/coronavirus/2019-ncov/about/steps-when-sick.html    CDC: Ending Home Isolation: www.cdc.gov/coronavirus/2019-ncov/hcp/disposition-in-home-patients.html     CDC: Caring for Someone: www.cdc.gov/coronavirus/2019-ncov/if-you-are-sick/care-for-someone.html     [facility]: Interim Guidance for Hospital Discharge to Home: www.health.[facility].mn.us/diseases/coronavirus/hcp/hospdischarge.pdf    [facility] clinical trials (COVID-19 research studies): [email or web address]-[email or web address]-[email or web address]     Below are the COVID-19 hotlines at the [facility] ([facility]). Interpreters are available.   o For health questions: Call 754-725-9907 or 1-831.946.7497 (7 a.m. to 7 p.m.)  o For questions about schools and childcare: Call 121-070-7243 or 1-341.553.4817 (7 a.m. to 7 p.m.)

## 2022-04-14 NOTE — ED PROVIDER NOTES
EMERGENCY DEPARTMENT ENCOUNTER      NAME: Pinky Duggan  AGE: 89 year old female  YOB: 1924  MRN: [id number]  EVALUATION DATE & TIME: 4/14/2022  1:04 PM    PCP: Douglas Sosa    ED PROVIDER: Eric Blackman PA-C      Chief Complaint   Patient presents with     Shortness of Breath         FINAL IMPRESSION:  1. Cough    2. Elevated brain natriuretic peptide (BNP) level    3. History of CHF (congestive heart failure)          MEDICAL DECISION MAKING:    Pertinent Labs & Imaging studies reviewed. (See chart for details)  89 year old female presents to the Emergency Department for evaluation of persistent cough over the last couple of weeks.    Currently the patient denies any acute chest pain or shortness of breath.    Plan to assess with EKG, screening labs, chest x-ray at this time.  Patient's resting oxygen level is 92% without supplemental oxygen.    Patient screening labs did come back showing some slightly worsening renal dysfunction, could be related to the recent increase in dose of her Lasix or some mild dehydration.  Chest x-ray does not show any signs of congestive heart failure therefore I will give her small amount of fluids.  They discussed a spiculated density in the left upper lobe, recommending CT scan could assess this more thoroughly.  However the patient's GFR is slightly low therefore I think it would be more appropriate to assess if this as an outpatient as it does not seem likely that this is any cause of symptoms.  Based on her age certainly this could be some form of cancer however they denied it appearing masslike on x-ray.  Likely will discuss with family as far as outpatient work-up    I had a long and thorough discussion with the patient and her power of , her nephew at the bedside.  We discussed the x-ray findings of the spiculated density in the left upper lobe.  Ultimately they understand that further assessment in the form of CT scan could occur however even  if they were to identify concern for lung cancer this is not something that would be interested in treating or even further testing for.  There is no obvious signs of infiltrative process to suggest a pneumonia and no pulmonary edema that can be appreciated.    I also discussed the quite elevated BNP at over 4000.  Patient has known CHF.  Last echocardiogram from 2019 showed an ejection fraction of 20%.  From review of previous labs she consistently has BNP readings in the thousands.  Certainly a new echocardiogram, and repeat labs could be justified, however again based on her age seems unlikely that any type of aggressive modalities would be recommended.  Currently she appears asymptomatic, very minimal hypoxia at rest we are seeing oxygen saturations ranged between 88 and 92%.    Patient is reported as DNR/DNI.  Will await troponin results and then discuss case with cardiology to get their recommendations.    Discussed case with cardiology.  Troponin returned normal.  At this point time they agree that obtaining echocardiogram and a short course of increasing her Lasix dose is likely reasonable.  Because the patient is only showing minimal symptoms we will need to be careful and gentle with the changes in Lasix dose.  We also will attempt to obtain echocardiogram while here in the ER before the end of the day.  Certainly this could be helpful and reassuring patient and family regarding disposition back to her nursing home.  Overall patient and nephew at the bedside are agreeable with the plan so that if the echocardiogram does not show severe change, we will plan to discharge the patient back to her care facility with a short course of increasing her Lasix dosing.  If there is something more concerning or significant on the echo then we can certainly admit the patient here for observation and diuresis as an inpatient.    Patient would prefer to discharge back to her living facility.    Patient will be signed out  to Dr. Godfrey Mendiola.    ED COURSE  1:21 PM   I met with the patient, obtained history, performed an initial exam, and discussed options and plan for diagnostics and treatment here in the ED.    2:45 PM   I rechecked and updated the patient.     3:32 PM   I spoke with Dr. Robison, cardiologist.     At the conclusion of the encounter I discussed the results of all of the tests and the disposition. The questions were answered. The patient or family acknowledged understanding and was agreeable with the care plan.     MEDICATIONS GIVEN IN THE EMERGENCY:  Medications   furosemide (LASIX) injection 40 mg (40 mg Intravenous Given 4/14/22 8919)       NEW PRESCRIPTIONS STARTED AT TODAY'S ER VISIT  New Prescriptions    No medications on file            =================================================================    HPI    Patient information was obtained from: patient and EMS    Use of Interpretor: N/A       Pinky NGUYEN Doris Duggan is a 89 year old female with a pertinent history of hypertension, atrial fibrillation (single episode 3/19), and dilated cardiomyopathy who presents to this ED via EMS for evaluation of shortness of breath.     Per patient, patient has been experiencing a dry cough for the past few weeks. She notes the cough is occasional productive and improves at night. She was recommended to go the ED by nursing staff. EMS gave patient albuteral Atrovent neb en route, she states this alleviated some of her symptoms. Denies shortness of breath, chest pain, abdominal pain, back pain, fever, or any other additional symptoms at this time.     Per EMS, patient comes from [facility] and has been experiencing shortness of breath with cough for 2 weeks. She recently visited her clinic and her Lasix was increased from 40 mg to 60 mg, and has been returned to 40 mg. They also noted some wheezing and states patient was at 92% on room air.       REVIEW OF SYSTEMS   Review of Systems   Constitutional: Negative for  chills and fever.   HENT: Negative for congestion, sore throat and trouble swallowing.    Eyes: Negative for discharge and visual disturbance.   Respiratory: Positive for cough (dry). Negative for chest tightness and shortness of breath.    Cardiovascular: Negative for chest pain and leg swelling.   Gastrointestinal: Negative for abdominal pain, diarrhea, nausea and vomiting.   Genitourinary: Negative for dysuria, frequency, hematuria and urgency.   Musculoskeletal: Negative for back pain, gait problem and neck pain.   Skin: Negative for rash and wound.   Neurological: Negative for weakness, numbness and headaches.   Psychiatric/Behavioral: Negative for behavioral problems and suicidal ideas.        PAST MEDICAL HISTORY:  Past Medical History:   Diagnosis Date     Abnormal ECG      Atrial fibrillation (H)      CHF (congestive heart failure) (H)      Closed left hip fracture, initial encounter (H) 6/27/2019     DNR (do not resuscitate)      HTN (hypertension)      Nonsmoker      Osteoporosis     Completed 5 years of alendronate therapy.     Paroxysmal atrial fibrillation (H)      Uses walker 1/5/2020       PAST SURGICAL HISTORY:  Past Surgical History:   Procedure Laterality Date     APPENDECTOMY       ZZC PERCUT FIX PROX/NECK FEMUR FX Left 6/28/2019    Procedure: INTERNAL FIXATION, FRACTURE, HIP, WITH PINS OR NAILS;  Surgeon: Rohith Galeano MD;  Location: [facility];  Service: Orthopedics       CURRENT MEDICATIONS:    No current facility-administered medications for this encounter.    Current Outpatient Medications:      acetaminophen (TYLENOL) 500 MG tablet, [ACETAMINOPHEN (TYLENOL) 500 MG TABLET] Take 1,000 mg by mouth every 6 (six) hours as needed for pain., Disp: , Rfl:      aspirin 325 MG EC tablet, [ASPIRIN 325 MG EC TABLET] Take 1 tablet (325 mg total) by mouth daily., Disp: , Rfl: 0     furosemide (LASIX) 40 MG tablet, [FUROSEMIDE (LASIX) 40 MG TABLET] Take 1 tablet (40 mg total) by mouth daily.,  "Disp: 90 tablet, Rfl: 3     lisinopril (PRINIVIL,ZESTRIL) 5 MG tablet, [LISINOPRIL (PRINIVIL,ZESTRIL) 5 MG TABLET] Take 1 tablet (5 mg total) by mouth every evening., Disp: 90 tablet, Rfl: 3     metoprolol tartrate (LOPRESSOR) 25 MG tablet, [METOPROLOL TARTRATE (LOPRESSOR) 25 MG TABLET] Take 1 tablet (25 mg total) by mouth 2 (two) times a day., Disp: 60 tablet, Rfl: 11     omeprazole (PRILOSEC) 20 MG capsule, [OMEPRAZOLE (PRILOSEC) 20 MG CAPSULE] Take 1 capsule (20 mg total) by mouth daily before breakfast., Disp: 90 capsule, Rfl: 3      ALLERGIES:  Allergies   Allergen Reactions     Penicillins Swelling       FAMILY HISTORY:  Family History   Problem Relation Age of Onset     Cerebrovascular Disease Mother      Leukemia Father        SOCIAL HISTORY:   Social History     Socioeconomic History     Marital status:      Number of children: 0   Tobacco Use     Smoking status: Never Smoker     Smokeless tobacco: Never Used   Substance and Sexual Activity     Alcohol use: Yes     Comment: Alcoholic Drinks/day: not regularly     Drug use: No   Social History Narrative    Former patient of Dr. Damaris Billings.    No children.  Former .    POA for medical decisions is Araceli Villarreal.    Contact Alida Villarreal for results or issues - may leave message.  Home - [phone]  Cell phone - [phone]    Moved to  [facility] in 2019.  Walks with a walker.  Remains active.       VITALS:  Patient Vitals for the past 24 hrs:   BP Temp Pulse Resp SpO2 Height Weight   04/14/22 1600 (!) 145/78 -- 83 28 96 % -- --   04/14/22 1545 (!) 147/75 -- 80 28 96 % -- --   04/14/22 1530 104/51 -- 80 27 96 % -- --   04/14/22 1518 (!) 147/80 -- 76 23 96 % -- --   04/14/22 1500 (!) 150/70 -- 78 (!) 34 96 % -- --   04/14/22 1418 (!) 148/70 -- 75 26 98 % -- --   04/14/22 1400 (!) 140/78 -- 73 28 98 % -- --   04/14/22 1305 (!) 142/66 97.8  F (36.6  C) 80 24 92 % 1.702 m (5' 7\") 49.4 kg (109 lb)       PHYSICAL EXAM    Physical " Exam  Vitals and nursing note reviewed.   Constitutional:       General: She is not in acute distress.     Appearance: She is not ill-appearing or toxic-appearing.      Comments: Frail and cachectic, elderly   HENT:      Head: Normocephalic and atraumatic.      Right Ear: External ear normal.      Left Ear: External ear normal.      Nose: Nose normal.      Mouth/Throat:      Mouth: Mucous membranes are moist.   Eyes:      Extraocular Movements: Extraocular movements intact.      Conjunctiva/sclera: Conjunctivae normal.      Pupils: Pupils are equal, round, and reactive to light.   Cardiovascular:      Rate and Rhythm: Normal rate.      Pulses: Normal pulses.   Pulmonary:      Effort: Pulmonary effort is normal. No respiratory distress.      Breath sounds: No stridor. No wheezing or rales.   Abdominal:      General: Abdomen is flat. Bowel sounds are normal.      Palpations: There is no mass.      Tenderness: There is no abdominal tenderness. There is no guarding.   Musculoskeletal:         General: No tenderness, deformity or signs of injury.      Cervical back: Normal range of motion.      Right lower leg: No edema.      Left lower leg: No edema.   Skin:     General: Skin is warm and dry.      Capillary Refill: Capillary refill takes less than 2 seconds.      Findings: No rash.   Neurological:      General: No focal deficit present.      Mental Status: She is alert. Mental status is at baseline.      Cranial Nerves: No cranial nerve deficit.      Sensory: No sensory deficit.      Motor: No weakness.   Psychiatric:         Mood and Affect: Mood normal.          LAB:  All pertinent labs reviewed and interpreted.  Results for orders placed or performed during the hospital encounter of 04/14/22   XR Chest Port 1 View    Impression    IMPRESSION: Lungs are hyperexpanded, unchanged.      Subtle spiculated density in the left upper lobe, lateral to the aortic knob. There is no central mass. If needed, chest CT can further  evaluate this    No signs of pneumonia or failure. Heart and pulmonary vascularity are normal. Severe S-shaped thoracolumbar scoliosis   Extra Red Top Tube   Result Value Ref Range    Hold Specimen JIC    Extra Purple Top Tube   Result Value Ref Range    Hold Specimen JIC    Result Value Ref Range    Troponin I 0.04 0.00 - 0.29 ng/mL   Comprehensive metabolic panel   Result Value Ref Range    Sodium 139 136 - 145 mmol/L    Potassium 5.6 (H) 3.5 - 5.0 mmol/L    Chloride 105 98 - 107 mmol/L    Carbon Dioxide (CO2) 20 (L) 22 - 31 mmol/L    Anion Gap 14 5 - 18 mmol/L    Urea Nitrogen 56 (H) 8 - 28 mg/dL    Creatinine 1.72 (H) 0.60 - 1.10 mg/dL    Calcium 8.8 8.5 - 10.5 mg/dL    Glucose 113 70 - 125 mg/dL    Alkaline Phosphatase 73 45 - 120 U/L    AST 24 0 - 40 U/L    ALT 20 0 - 45 U/L    Protein Total 6.8 6.0 - 8.0 g/dL    Albumin 3.0 (L) 3.5 - 5.0 g/dL    Bilirubin Total 0.3 0.0 - 1.0 mg/dL    GFR Estimate 27 (L) >60 mL/min/1.73m2   B-Type Natriuretic Peptide ([facility] Only)   Result Value Ref Range    BNP 4,354 (H) 0 - 167 pg/mL   Symptomatic; Yes; 4/1/2022 Influenza A/B & SARS-CoV2 (COVID-19) Virus PCR Multiplex Nasopharyngeal    Specimen: Nasopharyngeal; Swab   Result Value Ref Range    Influenza A PCR Negative Negative    Influenza B PCR Negative Negative    SARS CoV2 PCR Negative Negative   CBC with platelets and differential   Result Value Ref Range    WBC Count 8.9 4.0 - 11.0 10e3/uL    RBC Count 3.91 3.80 - 5.20 10e6/uL    Hemoglobin 12.2 11.7 - 15.7 g/dL    Hematocrit 39.4 35.0 - 47.0 %     (H) 78 - 100 fL    MCH 31.2 26.5 - 33.0 pg    MCHC 31.0 (L) 31.5 - 36.5 g/dL    RDW 13.7 10.0 - 15.0 %    Platelet Count 297 150 - 450 10e3/uL    % Neutrophils 83 %    % Lymphocytes 9 %    % Monocytes 7 %    % Eosinophils 0 %    % Basophils 1 %    % Immature Granulocytes 0 %    NRBCs per 100 WBC 0 <1 /100    Absolute Neutrophils 7.4 1.6 - 8.3 10e3/uL    Absolute Lymphocytes 0.8 0.8 - 5.3 10e3/uL    Absolute Monocytes  0.6 0.0 - 1.3 10e3/uL    Absolute Eosinophils 0.0 0.0 - 0.7 10e3/uL    Absolute Basophils 0.1 0.0 - 0.2 10e3/uL    Absolute Immature Granulocytes 0.0 <=0.4 10e3/uL    Absolute NRBCs 0.0 10e3/uL       RADIOLOGY:  Reviewed all pertinent imaging. Please see official radiology report.  XR Chest Port 1 View   Final Result   IMPRESSION: Lungs are hyperexpanded, unchanged.        Subtle spiculated density in the left upper lobe, lateral to the aortic knob. There is no central mass. If needed, chest CT can further evaluate this      No signs of pneumonia or failure. Heart and pulmonary vascularity are normal. Severe S-shaped thoracolumbar scoliosis      Echocardiogram Complete    (Results Pending)       EKG:    Performed at: 1310    The EKG shows sinus rhythm at a rate of 82 bpm.  There is evidence of chronic left bundle branch block.  Compared to previous EKG from 2019 no significant change in morphology.  QTC measured at 490.    I have independently reviewed and interpreted the EKG(s) documented above.        PROCEDURES:   None      I, Juhi Bolden, am serving as a scribe to document services personally performed by Eric Blackman PA-C based on my observation and the provider's statements to me. I, Eric Blackman PA-C attest that Juhi Bolden is acting in a scribe capacity, has observed my performance of the services and has documented them in accordance with my direction.    Eric Blackman PA-C  Emergency Medicine  [facility]     Eric Blackman PA-C  04/14/22 8751

## 2022-04-15 NOTE — PROGRESS NOTES
[facility] Progress Note      ASSESSMENT:    Principal Problem:    Acute on chronic systolic congestive heart failure (H)  Active Problems:    DNR (do not resuscitate) / DNI    HTN (hypertension)    Paroxysmal atrial fibrillation (H) - single episode 3/19    Dilated cardiomyopathy (H)    Acute kidney injury superimposed on CKD (H)    Gastroesophageal reflux disease with esophagitis      PLAN:   89-year-old female with history of paroxysmal A. fib, dilated cardiomyopathy, chronic systolic CHF, CKD and GERD presents with shortness of breath and found to have acute on chronic systolic CHF.     Shortness of breath: Likely secondary to acute on chronic systolic CHF given markedly elevated BNP with complaints of orthopnea.  Chest x-ray shows no evidence to corroborate CHF and does show a spiculated density left upper lobe.  Troponin negative.  EKG without concerning ischemic findings.  Most recent TTE from 2019 showed EF of 20% with moderate to severe mitral regurgitation and mild aortic stenosis with mild aortic insufficiency.  STAT echo ordered on admission reportedly shows further decrease in EF to 10 to 20% compared to 20% in 2019, there is also findings of severe mitral regurg compared to moderate to severe mitral regurg in 2019.  Formal TTE read reviewed and notes actually increased LVEF of 30-35% with decreased RV function, severe 3+ MR, moderate mitral and aortic stenosis  --Cardiology initiating lasix drip, imdur, hydralazine   --completed intermittent IV Lasix overnight with good results  --changed home metoprolol 25 bid to Toprol 50mg daily  --Hold home lisinopril given YASMEEN  --Family requesting Hospice consultation which has been ordered        YASMEEN on CKD: Suspect related to cardiorenal physiology, improved with diuresis  --Hold home lisinopril indefinitely  --Monitor daily        Spiculated density left upper lobe: Incidentally found on chest x-ray, can consider CT of the chest if congruent with  "patient and family goals of care.  --Hospice consultation        Mary AAnamaria fib:   -- continue full-strength aspirin  -- changed home metoprolol 25 bid to Toprol 50mg daily per cadiology       History of GERD: Continue home PPI        DVT PPX: SCD, ambulate     Code status: DNR confirmed on admission              Espinoza Hunter D.O.                 -------------------------------------------------------------------------------------------------------------  SUBJECTIVE: NAD.  Breathing improved. Denies any nausea, vomiting, abdominal pain, chest pain, new swelling, fevers, chills, confusion or headache.     Exam:  BP (!) 153/85 (BP Location: Right arm)   Pulse 88   Temp 97.6  F (36.4  C) (Oral)   Resp 20   Ht 1.702 m (5' 7\")   Wt 39.2 kg (86 lb 8 oz)   SpO2 96%   BMI 13.55 kg/m    General: NAD  RESPIRATORY: Breathing nonlabored  CARDIOVASCULAR: No le edema bilat.   ABDOMEN: soft and non-tender  NEUROLOGIC: Motor intact, speech clear      Diagnostics Reviewed:      Recent Results (from the past 24 hour(s))   ECG 12-LEAD WITH MUSE (LHE)    Collection Time: 04/14/22  1:10 PM   Result Value Ref Range    Systolic Blood Pressure 142 mmHg    Diastolic Blood Pressure 66 mmHg    Ventricular Rate 82 BPM    Atrial Rate 82 BPM    NM Interval 172 ms    QRS Duration 158 ms     ms    QTc 490 ms    P Axis 76 degrees    R AXIS -6 degrees    T Axis 143 degrees    Interpretation ECG       Sinus rhythm with Premature atrial complexes  Left ventricular hypertrophy with QRS widening  Abnormal ECG  When compared with ECG of 27-JUN-2019 18:20,  Premature ventricular complexes are no longer Present  Premature atrial complexes are now Present  Left bundle branch block is no longer Present  Confirmed by SEE ED PROVIDER NOTE FOR, ECG INTERPRETATION ([id number]),  PASCUAL HSU ([id number]) on 4/15/2022 8:17:44 AM     Extra Red Top Tube    Collection Time: 04/14/22  1:25 PM   Result Value Ref Range    Hold Specimen JIC    Extra " Purple Top Tube    Collection Time: 04/14/22  1:25 PM   Result Value Ref Range    Hold Specimen JIC    Troponin I    Collection Time: 04/14/22  1:25 PM   Result Value Ref Range    Troponin I 0.04 0.00 - 0.29 ng/mL   Comprehensive metabolic panel    Collection Time: 04/14/22  1:25 PM   Result Value Ref Range    Sodium 139 136 - 145 mmol/L    Potassium 5.6 (H) 3.5 - 5.0 mmol/L    Chloride 105 98 - 107 mmol/L    Carbon Dioxide (CO2) 20 (L) 22 - 31 mmol/L    Anion Gap 14 5 - 18 mmol/L    Urea Nitrogen 56 (H) 8 - 28 mg/dL    Creatinine 1.72 (H) 0.60 - 1.10 mg/dL    Calcium 8.8 8.5 - 10.5 mg/dL    Glucose 113 70 - 125 mg/dL    Alkaline Phosphatase 73 45 - 120 U/L    AST 24 0 - 40 U/L    ALT 20 0 - 45 U/L    Protein Total 6.8 6.0 - 8.0 g/dL    Albumin 3.0 (L) 3.5 - 5.0 g/dL    Bilirubin Total 0.3 0.0 - 1.0 mg/dL    GFR Estimate 27 (L) >60 mL/min/1.73m2   B-Type Natriuretic Peptide ([facility] Only)    Collection Time: 04/14/22  1:25 PM   Result Value Ref Range    BNP 4,354 (H) 0 - 167 pg/mL   CBC with platelets and differential    Collection Time: 04/14/22  1:25 PM   Result Value Ref Range    WBC Count 8.9 4.0 - 11.0 10e3/uL    RBC Count 3.91 3.80 - 5.20 10e6/uL    Hemoglobin 12.2 11.7 - 15.7 g/dL    Hematocrit 39.4 35.0 - 47.0 %     (H) 78 - 100 fL    MCH 31.2 26.5 - 33.0 pg    MCHC 31.0 (L) 31.5 - 36.5 g/dL    RDW 13.7 10.0 - 15.0 %    Platelet Count 297 150 - 450 10e3/uL    % Neutrophils 83 %    % Lymphocytes 9 %    % Monocytes 7 %    % Eosinophils 0 %    % Basophils 1 %    % Immature Granulocytes 0 %    NRBCs per 100 WBC 0 <1 /100    Absolute Neutrophils 7.4 1.6 - 8.3 10e3/uL    Absolute Lymphocytes 0.8 0.8 - 5.3 10e3/uL    Absolute Monocytes 0.6 0.0 - 1.3 10e3/uL    Absolute Eosinophils 0.0 0.0 - 0.7 10e3/uL    Absolute Basophils 0.1 0.0 - 0.2 10e3/uL    Absolute Immature Granulocytes 0.0 <=0.4 10e3/uL    Absolute NRBCs 0.0 10e3/uL   Symptomatic; Yes; 4/1/2022 Influenza A/B & SARS-CoV2 (COVID-19) Virus PCR  Multiplex Nasopharyngeal    Collection Time: 04/14/22  2:04 PM    Specimen: Nasopharyngeal; Swab   Result Value Ref Range    Influenza A PCR Negative Negative    Influenza B PCR Negative Negative    SARS CoV2 PCR Negative Negative   Echocardiogram Complete    Collection Time: 04/14/22  6:39 PM   Result Value Ref Range    LVEF  30-35% (moderately reduced)    Basic metabolic panel    Collection Time: 04/15/22  4:57 AM   Result Value Ref Range    Sodium 139 136 - 145 mmol/L    Potassium 4.9 3.5 - 5.0 mmol/L    Chloride 104 98 - 107 mmol/L    Carbon Dioxide (CO2) 21 (L) 22 - 31 mmol/L    Anion Gap 14 5 - 18 mmol/L    Urea Nitrogen 58 (H) 8 - 28 mg/dL    Creatinine 1.49 (H) 0.60 - 1.10 mg/dL    Calcium 9.0 8.5 - 10.5 mg/dL    Glucose 87 70 - 125 mg/dL    GFR Estimate 32 (L) >60 mL/min/1.73m2   Magnesium    Collection Time: 04/15/22  4:57 AM   Result Value Ref Range    Magnesium 2.4 1.8 - 2.6 mg/dL   Extra Purple Top Tube    Collection Time: 04/15/22  4:57 AM   Result Value Ref Range    Hold Specimen JI

## 2022-04-15 NOTE — CONSULTS
"HOSPICE -  Update - 220pm Writer was informed by LUIZ Calhoun that pt and family do want hospice upon discharge. Writer spoke with pt nephew, Malvin, and explained hospice benefits and philosophy. Malvin said plan is for pt to return to her [facility], [facility] in [place] with hospice.   [facility] can admit pt over the weekend, however, prison will not accept pt for return until Monday. [facility] is unable to do SOC on Monday d/t being at capacity. Writer recommended [facility] or  hospice. Malvin is fine with either one.   Writer updated Unique DEE.   Thank you  Hyun DANIEL  [facility]  [phone]    Hospice -  received consult. I spoke with Unique DEE and pt is wanting to discharge without hospice at this time. Pt is note ready for hospice.   Per pt \"I will call them when I am ready\".   Thank you  Hyun Cifuentes  [facility]  [phone]  "

## 2022-04-15 NOTE — PLAN OF CARE
PRIMARY DIAGNOSIS: CONGESTIVE HEART FAILURE  OUTPATIENT/OBSERVATION GOALS TO BE MET BEFORE DISCHARGE:  Dyspnea improved and O2 sats >88% at RA or at prior home O2 therapy level: No        SpO2: 94 %, O2 Device: Nasal cannula  Vitals:    04/14/22 1305 04/14/22 2229 04/15/22 0457   Weight: 49.4 kg (109 lb) 38.3 kg (84 lb 6.4 oz) 39.2 kg (86 lb 8 oz)        ECHO and other diagnostic testing complete (if applicable): No    Return to near baseline physical activity: No    Discharge Planner Nurse   Safe discharge environment identified: Yes  Barriers to discharge: Yes       Entered by: Kimber Lomeli 04/15/2022 6:22 AM     Please review provider order for any additional goals.   Nurse to notify provider when observation goals have been met and patient is ready for discharge.

## 2022-04-15 NOTE — PLAN OF CARE
PRIMARY DIAGNOSIS: CONGESTIVE HEART FAILURE  OUTPATIENT/OBSERVATION GOALS TO BE MET BEFORE DISCHARGE:  Dyspnea improved and O2 sats >88% at RA or at prior home O2 therapy level: No        SpO2: 95 %, O2 Device: Nasal cannula  Vitals:    04/14/22 1305 04/14/22 2229   Weight: 49.4 kg (109 lb) 38.3 kg (84 lb 6.4 oz)        ECHO and other diagnostic testing complete (if applicable): No    Return to near baseline physical activity: No    Discharge Planner Nurse   Safe discharge environment identified: Yes  Barriers to discharge: Yes       Entered by: Kimber Lomeli 04/15/2022 12:37 AM     Please review provider order for any additional goals.   Nurse to notify provider when observation goals have been met and patient is ready for discharge.

## 2022-04-15 NOTE — PHARMACY-ADMISSION MEDICATION HISTORY
Pharmacy Note - Admission Medication History    Pertinent Provider Information: Med list provided by [facility]     ______________________________________________________________________    Prior To Admission (PTA) med list completed and updated in EMR.       PTA Med List   Medication Sig Last Dose     acetaminophen (TYLENOL) 500 MG tablet [ACETAMINOPHEN (TYLENOL) 500 MG TABLET] Take 1,000 mg by mouth every 6 (six) hours as needed for pain. 4/14/2022 at am     aspirin 325 MG EC tablet [ASPIRIN 325 MG EC TABLET] Take 1 tablet (325 mg total) by mouth daily. 4/14/2022 at 0745     benzonatate (TESSALON) 100 MG capsule Take 100 mg by mouth 3 times daily as needed for cough Unknown at prn     furosemide (LASIX) 40 MG tablet [FUROSEMIDE (LASIX) 40 MG TABLET] Take 1 tablet (40 mg total) by mouth daily. 4/14/2022 at 0745     lisinopril (PRINIVIL,ZESTRIL) 5 MG tablet [LISINOPRIL (PRINIVIL,ZESTRIL) 5 MG TABLET] Take 1 tablet (5 mg total) by mouth every evening. 4/13/2022 at 2030     metoprolol tartrate (LOPRESSOR) 25 MG tablet [METOPROLOL TARTRATE (LOPRESSOR) 25 MG TABLET] Take 1 tablet (25 mg total) by mouth 2 (two) times a day. 4/14/2022 at 0745     omeprazole (PRILOSEC) 20 MG capsule [OMEPRAZOLE (PRILOSEC) 20 MG CAPSULE] Take 1 capsule (20 mg total) by mouth daily before breakfast. 4/14/2022 at 0745     senna (SENOKOT) 8.6 MG tablet Take 1 tablet by mouth daily as needed for constipation Unknown at prn       Information source(s): Facility ([facility]/) medication list/MAR  Method of interview communication: N/A    Summary of Changes to PTA Med List  New: -  Discontinued: -  Changed: -    Patient was asked about OTC/herbal products specifically.  PTA med list reflects this.    In the past week, patient estimated taking medication this percent of the time:  greater than 90%.    Allergies were reviewed, assessed, and updated with the patient.      Patient does not use any multi-dose medications prior to admission.    The  information provided in this note is only as accurate as the sources available at the time of the update(s).    Thank you for the opportunity to participate in the care of this patient.    DIANE PEREZ RPH  4/14/2022 7:24 PM

## 2022-04-15 NOTE — ED NOTES
"[facility] ED Handoff Report    ED Chief Complaint: SOB    ED Diagnosis:  (I50.23) Acute on chronic systolic congestive heart failure (H)  (primary encounter diagnosis)  Comment:   Plan:        PMH:    Past Medical History:   Diagnosis Date     Abnormal ECG      Atrial fibrillation (H)      CHF (congestive heart failure) (H)      Closed left hip fracture, initial encounter (H) 6/27/2019     DNR (do not resuscitate)      HTN (hypertension)      Nonsmoker      Osteoporosis     Completed 5 years of alendronate therapy.     Paroxysmal atrial fibrillation (H)      Uses walker 1/5/2020        Code Status:  No CPR- Do NOT Intubate     Falls Risk: Yes Band: Applied    Current Living Situation/Residence: lives in a skilled nursing facility     Elimination Status: Continent: wears briefs     Activity Level: SBA w/ walker    Patients Preferred Language:  English     Needed: No    Vital Signs:  BP (!) 150/71   Pulse 95   Temp 97.8  F (36.6  C)   Resp 30   Ht 1.702 m (5' 7\")   Wt 49.4 kg (109 lb)   SpO2 92%   BMI 17.07 kg/m       Cardiac Rhythm: a-fib w/ bundle branch block & PVCs    Pain Score: 0/10    Is the Patient Confused:  No    Last Food or Drink: 04/14/22 at 2000    Focused Assessment:  Pt arrived to ED via EMS from [facility] d/t c/o SOB & cough x2 weeks. Breath sounds course, lasix given see MAR, on 2L NC w/ sats in lower 90s. Pt a/ox4, denies pain, CP states that she is not feeling SOB at this time when she is sitting up w/ O2. Purewick in place. Call light w/in reach, pt able to make needs known.     Tests Performed: Done: Labs and Imaging    Treatments Provided:  Medications see MAR    Family Dynamics/Concerns: No    Family Updated On Visitor Policy: Yes    Plan of Care Communicated to Family: Yes    Who Was Updated about Plan of Care: great nephaditya hawkins at bedside     Belongings Checklist Done and Signed by Patient: Yes    Covid: symptomatic, negative    RN: Nicole MONDRAGON" Adalgisa   4/14/2022 9:54 PM

## 2022-04-15 NOTE — PLAN OF CARE
Problem: Adjustment to Illness (Heart Failure)  Goal: Optimal Coping  Outcome: Ongoing, Progressing     Problem: Respiratory Compromise (Heart Failure)  Goal: Effective Oxygenation and Ventilation  Outcome: Ongoing, Progressing     Goal Outcome Evaluation:    Sinus Rhythm with BBB, PVC's. VSS.O2 wean down to 1L NC. Sp02 at 88% on Room Air so kept at 1L 02. Dc'ed Lasix drip, started IVP Lasix.

## 2022-04-15 NOTE — PLAN OF CARE
"  Problem: Plan of Care - These are the overarching goals to be used throughout the patient stay.    Goal: Plan of Care Review/Shift Note  Description: The Plan of Care Review/Shift note should be completed every shift.  The Outcome Evaluation is a brief statement about your assessment that the patient is improving, declining, or no change.  This information will be displayed automatically on your shift note.  Outcome: Ongoing, Progressing  Goal: Patient-Specific Goal (Individualized)  Description: You can add care plan individualizations to a care plan. Examples of Individualization might be:  \"Parent requests to be called daily at 9am for status\", \"I have a hard time hearing out of my right ear\", or \"Do not touch me to wake me up as it startles me\".  Outcome: Ongoing, Progressing  Goal: Absence of Hospital-Acquired Illness or Injury  Outcome: Ongoing, Progressing  Intervention: Identify and Manage Fall Risk  Recent Flowsheet Documentation  Taken 4/15/2022 1206 by Kina Dee, RN  Safety Promotion/Fall Prevention:   activity supervised   nonskid shoes/slippers when out of bed   room door open  Taken 4/15/2022 0900 by Kina Dee, RN  Safety Promotion/Fall Prevention:   activity supervised   nonskid shoes/slippers when out of bed   room door open  Intervention: Prevent and Manage VTE (Venous Thromboembolism) Risk  Recent Flowsheet Documentation  Taken 4/15/2022 1206 by Kina Dee, RN  Activity Management: activity adjusted per tolerance  Taken 4/15/2022 0900 by Kina Dee, RN  Activity Management: activity adjusted per tolerance  Goal: Optimal Comfort and Wellbeing  Outcome: Ongoing, Progressing   Goal Outcome Evaluation:  Wily updated by Dr Hunter  Patient was up in the chair with assist of 1                    "

## 2022-04-15 NOTE — CONSULTS
Care Management Initial Consult    General Information  Assessment completed with: Patient,    Type of CM/SW Visit: Offer D/C Planning    Primary Care Provider verified and updated as needed:     Readmission within the last 30 days:        Reason for Consult: discharge planning  Advance Care Planning: Advance Care Planning Reviewed: verified with patient          Communication Assessment  Patient's communication style: spoken language (English or Bilingual)    Hearing Difficulty or Deaf: no   Wear Glasses or Blind: yes    Cognitive  Cognitive/Neuro/Behavioral: WDL                      Living Environment:   People in home: alone     Current living Arrangements: assisted living      Able to return to prior arrangements: yes       Family/Social Support:  Care provided by: self  Provides care for: no one     Other (specify) (great Nephew)          Description of Support System:           Current Resources:   Patient receiving home care services: No     Community Resources:    Equipment currently used at home:    Supplies currently used at home:      Employment/Financial:  Employment Status: retired        Financial Concerns:             Lifestyle & Psychosocial Needs:  Social Determinants of Health     Tobacco Use: Low Risk      Smoking Tobacco Use: Never Smoker     Smokeless Tobacco Use: Never Used   Alcohol Use: Not on file   Financial Resource Strain: Not on file   Food Insecurity: Not on file   Transportation Needs: Not on file   Physical Activity: Not on file   Stress: Not on file   Social Connections: Not on file   Intimate Partner Violence: Not on file   Depression: Not at risk     PHQ-2 Score: 1   Housing Stability: Not on file       Functional Status:  Prior to admission patient needed assistance:    living in [facility]- awaiting call for confirmation of services.     Additional Information:  SWCM met with pt and introduced self and role and conducted initial assessment ands discussed discharge planning. Pt stated  "that she lives at [facility] where she was receiving \"some help\" but reports to be fairly indpt with ADLs. Pt confirmed that her main contact is her Great Nephew Malvin. SW discussed pt plan for tx and confirmed that pt is not wanting to pursue active treatment at this time. SW discussed Hospice and pt noted that she is very familiar with Hospice care but is not quite ready for that at this to me. She did state that she would like Hospice in the future. Pt is planning to discharge back to [facility] with possible increased services.     FRANCIE spoke with  RN at [facility]. Pt received Medication set up and Homemaking but is very indpt with ADLs. Pt does use a walker for ambulation. SW discussed plan for pt to return at discharge. RN requested that pt return on Monday as the facility has no RN over the weekend.     FRANCIE left VM for pt great Nephaditya Coombs to discuss discharge planning.   UPDATE: FRANCIE spoke with Malvin and discussed discharge planning and referenced conversation has with pt about Hospice. Malvin stated that per his conversation with pt yesterday that Pt IS interested in Hospice Caree and Malvin would like to have a consult with [facility].     [facility] update Hyun Cifuentes- [facility] Liasion. -11:25am   2:00pm- updated with [facility] and [facility] is at capacity for Monday admissions ( pt cannot return to [facility] until Monday per facility ) . Hospice updated pt nephew.     [facility] and confirmed that they can accept pt with SOC planned for Monday. Brittany will reach out to Malvin today to discuss details.   Unique Dorsey, NU        "

## 2022-04-15 NOTE — CONSULTS
HEART CARE NOTE      Thank you, Dr. Hunter, for asking the [facility] Heart Care team to see Pinky Duggan to evaluate HFrEF c/b ADHF.    Assessment/Recommendations     1. HFrEF c/b ADHF  Assessment / Plan    Mildly hypervolemic on physical exam; continue current diuretic strategy    GDMT as detailed below; patient  End stage CM c/b CKD, advanced age and significant frailty --> agree with Hospice consult at this juncture for CC measures    Current Pharmacotherapy AHA Guideline-Directed Medical Therapy   Lisinopril 5 mg daily - on hold given YASMEEN  Lisinopril 20 mg twice daily   Metoprolol succinate 50 mg daily Carvedilol 25 mg twice daily   Spironolactone not started Spironolactone 25 mg once daily   Hydralazine 121.5 mg two times daily Hydralazine 100 mg three times daily   Isosorbide dinitrate 10 mg two times daily Isosorbide dinitrate 40 mg three times daily   SGLT2 inhibitor: not started Dapagliflozin or Empagliflozin 10 mg daily       2. pAtrial fibrillation  Assessment / Plan    Continue metoprolol and ASA    3. YASMEEN on CKD  Assessment / Plan    Likely 2/2 CRS - continue to monitor closely while on IV diuresis     History of Present Illness/Subjective    Ms. Pinky Duggan is a 89 year old female with a PMHx significant for paroxysmal A. fib, dilated cardiomyopathy, chronic systolic CHF, CKD and GERD presents with shortness of breath and found to have acute on chronic systolic CHF.    Today, Mrs. George denies any acute events or complaints; denies HF symptoms including orthopnea, PND, fluid retention/edema. She also denies chest pain and anginal equivalents. Management plan as detailed above.    ECG: Personally reviewed. normal sinus rhythm, PAC's noted.    ECHO 3/8/19 (personnaly Reviewed):    No previous study for comparison.    Left ventricle ejection fraction is severely decreased. The calculated left ventricular ejection fraction is 20%.    Normal right ventricular size and systolic function.     "Moderate left atrial enlargement    Moderate to severe eccentric mitral regurgitation    Mild aortic stenosis with mild aortic insufficiency        Physical Examination Review of Systems   BP (!) 150/67 (BP Location: Right arm)   Pulse 87   Temp 97.7  F (36.5  C) (Oral)   Resp 22   Ht 1.702 m (5' 7\")   Wt 39.2 kg (86 lb 8 oz)   SpO2 94%   BMI 13.55 kg/m    Body mass index is 13.55 kg/m .  Wt Readings from Last 3 Encounters:   04/15/22 39.2 kg (86 lb 8 oz)   08/07/20 43.5 kg (96 lb)   01/03/20 45.8 kg (101 lb)     General Appearance:   no distress, normal body habitus   ENT/Mouth: membranes moist, no oral lesions or bleeding gums.      EYES:  no scleral icterus, normal conjunctivae   Neck: no carotid bruits or thyromegaly   Chest/Lungs:   lungs are clear to auscultation, no rales or wheezing, equal chest wall expansion    Cardiovascular:   Regular. Normal first and second heart sounds with no murmurs, rubs, or gallops; the carotid, radial and posterior tibial pulses are intact, + JVD, but no LE edema bilaterally    Abdomen:  no organomegaly, masses, bruits, or tenderness; bowel sounds are present   Extremities: no cyanosis or clubbing   Skin: no xanthelasma, warm.    Neurologic: alert and calm     Psychiatric: alert and calm     A complete 10 systems ROS was reviewed  And is negative except what is listed in the HPI.          Medical History  Surgical History Family History Social History   Past Medical History:   Diagnosis Date     Abnormal ECG      Atrial fibrillation (H)      CHF (congestive heart failure) (H)      Closed left hip fracture, initial encounter (H) 6/27/2019     DNR (do not resuscitate)      HTN (hypertension)      Nonsmoker      Osteoporosis     Completed 5 years of alendronate therapy.     Paroxysmal atrial fibrillation (H)      Uses walker 1/5/2020    Past Surgical History:   Procedure Laterality Date     APPENDECTOMY       ZZC PERCUT FIX PROX/NECK FEMUR FX Left 6/28/2019    Procedure: " INTERNAL FIXATION, FRACTURE, HIP, WITH PINS OR NAILS;  Surgeon: Rohith Galeano MD;  Location: [facility] OR;  Service: Orthopedics    no family history of premature coronary artery disease Social History     Socioeconomic History     Marital status:      Spouse name: Not on file     Number of children: 0     Years of education: Not on file     Highest education level: Not on file   Occupational History     Not on file   Tobacco Use     Smoking status: Never Smoker     Smokeless tobacco: Never Used   Substance and Sexual Activity     Alcohol use: Yes     Comment: Alcoholic Drinks/day: not regularly     Drug use: No     Sexual activity: Not on file   Other Topics Concern     Not on file   Social History Narrative    Former patient of Dr. Damaris Billings.    No children.  Former .    POA for medical decisions is Nephaditya Villarreal.    Contact Alida Villarreal for results or issues - may leave message.  Home - [phone]  Cell phone - [phone]    Moved to  [facility] in 2019.  Walks with a walker.  Remains active.     Social Determinants of Health     Financial Resource Strain: Not on file   Food Insecurity: Not on file   Transportation Needs: Not on file   Physical Activity: Not on file   Stress: Not on file   Social Connections: Not on file   Intimate Partner Violence: Not on file   Housing Stability: Not on file           Lab Results    Chemistry/lipid CBC Cardiac Enzymes/BNP/TSH/INR   Lab Results   Component Value Date    CHOL 196 10/06/2009    HDL 70 (H) 10/06/2009    TRIG 128 10/06/2009    BUN 58 (H) 04/15/2022     04/15/2022    CO2 21 (L) 04/15/2022    Lab Results   Component Value Date    WBC 8.9 04/14/2022    HGB 12.2 04/14/2022    HCT 39.4 04/14/2022     (H) 04/14/2022     04/14/2022    Lab Results   Component Value Date    TROPONINI 0.04 04/14/2022    BNP 4,354 (H) 04/14/2022    TSH 2.51 03/09/2019    INR 1.14 (H) 06/29/2019     Lab Results   Component  Value Date    TROPONINI 0.04 04/14/2022          Weight:    Wt Readings from Last 3 Encounters:   04/15/22 39.2 kg (86 lb 8 oz)   08/07/20 43.5 kg (96 lb)   01/03/20 45.8 kg (101 lb)       Allergies  Allergies   Allergen Reactions     Penicillins Swelling         Surgical History  Past Surgical History:   Procedure Laterality Date     APPENDECTOMY       ZZC PERCUT FIX PROX/NECK FEMUR FX Left 6/28/2019    Procedure: INTERNAL FIXATION, FRACTURE, HIP, WITH PINS OR NAILS;  Surgeon: Rohith Galeano MD;  Location: [facility];  Service: Orthopedics       Social History  Tobacco:   History   Smoking Status     Never Smoker   Smokeless Tobacco     Never Used    Alcohol:   Social History    Substance and Sexual Activity      Alcohol use: Yes        Comment: Alcoholic Drinks/day: not regularly   Illicit Drugs:   History   Drug Use No       Family History  Family History   Problem Relation Age of Onset     Cerebrovascular Disease Mother      Leukemia Father           Fritz Donahue MD on 4/15/2022       cc: Douglas Sosa

## 2022-04-16 NOTE — PROGRESS NOTES
Physical Therapy        04/16/22 1400   Quick Adds   Type of Visit Initial PT Evaluation   Living Environment   People in Home alone   Current Living Arrangements assisted living   Home Accessibility no concerns   Living Environment Comments pt typically independent with ADLs and mobility   Self-Care   Usual Activity Tolerance moderate   Current Activity Tolerance poor   General Information   Onset of Illness/Injury or Date of Surgery 04/14/22   Referring Physician Espinoza Hunter DO   Patient/Family Therapy Goals Statement (PT) none stated   Pertinent History of Current Problem (include personal factors and/or comorbidities that impact the POC) CHF   Existing Precautions/Restrictions fall   Cognition   Cognitive Status Comments intermittently disoriented   Pain Assessment   Patient Currently in Pain No   Posture    Posture Scoliosis;Kyphosis;Protracted shoulders;Forward head position   Range of Motion (ROM)   Range of Motion ROM is WFL   Strength (Manual Muscle Testing)   Strength (Manual Muscle Testing) Deficits observed during functional mobility   Strength Comments strength grossly 4/5   Bed Mobility   Comment, (Bed Mobility) modA supine to sit with rail   Transfers   Comment, (Transfers) Sussy sit<>stand with 4WW, cues for brakes and safety   Gait/Stairs (Locomotion)   [place] Level (Gait) minimum assist (75% patient effort)   Assistive Device (Gait) walker, 4-wheeled   Distance in Feet (Required for LE Total Joints) 20'x2   Deviations/Abnormal Patterns (Gait) stride length decreased;gait speed decreased;base of support, narrow   Comment, (Gait/Stairs) unsteady   Balance   Balance Comments impaired   Sensory Examination   Sensory Perception patient reports no sensory changes   Coordination   Coordination no deficits were identified   Muscle Tone   Muscle Tone no deficits were identified   Clinical Impression   Criteria for Skilled Therapeutic Intervention Yes, treatment indicated   PT Diagnosis (PT)  gait instability   Influenced by the following impairments deconditioning   Functional limitations due to impairments limited household mobility   Clinical Presentation (PT Evaluation Complexity) Stable/Uncomplicated   Clinical Presentation Rationale presents as medically diagnosed   Clinical Decision Making (Complexity) moderate complexity   Planned Therapy Interventions (PT) balance training;bed mobility training;gait training;neuromuscular re-education;patient/family education;ROM (range of motion);stair training;transfer training   Anticipated Equipment Needs at Discharge (PT) walker, rolling;wheelchair  (does NOT own w/c)   Risk & Benefits of therapy have been explained evaluation/treatment results reviewed;participants voiced agreement with care plan;participants included;patient   PT Discharge Planning   PT Discharge Recommendation (DC Rec) Long term care facility  (inpatient hospice vs [facility] with assistance for all transfers/ambulation/cares)   PT Rationale for DC Rec inpatient hospice vs half-way with assistance for all transfers/ambulation/cares   Total Evaluation Time   Total Evaluation Time (Minutes) 10   Physical Therapy Goals   PT Frequency 6x/week   PT Predicted Duration/Target Date for Goal Attainment 04/20/22   PT Goals Transfers;Bed Mobility;Gait   PT: Bed Mobility Supervision/stand-by assist;Supine to/from sit   PT: Transfers Supervision/stand-by assist;Sit to/from stand   PT: Gait Supervision/stand-by assist;50 feet;Rolling walker       Maribel Saini, CAITLYN 4/16/2022

## 2022-04-16 NOTE — UTILIZATION REVIEW
Admission Status; Secondary Review Determination   Under the authority of the Utilization Management Committee, the utilization review process indicated a secondary review on Pinky Duggan. The review outcome is based on review of the medical records, discussions with staff, and applying clinical experience noted on the date of the review.   (x) Inpatient Status Appropriate - This patient's medical care is consistent with medical management for inpatient care and reasonable inpatient medical practice.     RATIONALE FOR DETERMINATION   89 yr old female with dilated CM, chrinic systolic CHF, CKD who presented with shortness of breath and found to have acute on chronic systolic CHF exacerbation.  Has received several doses of IV lasix by bolus and plan was for infusion and after started then converted back to bolus dosing at higher amounts.  Is requiring oxygen at 2L NC.  Close electrolyte monitoring and renal function given CKD.   Having elevated potassium levels in addition with Cr at 1.5 which is slightly above baseline.      At the time of admission with the information available to the attending physician more than 2 nights Hospital complex care was anticipated, based on patient risk of adverse outcome if treated as outpatient and complex care required. Inpatient admission is appropriate based on the Medicare guidelines.   The information on this document is developed by the utilization review team in order for the business office to ensure compliance. This only denotes the appropriateness of proper admission status and does not reflect the quality of care rendered.   The definitions of Inpatient Status and Observation Status used in making the determination above are those provided in the CMS Coverage Manual, Chapter 1 and Chapter 6, section 70.4.   Sincerely,   Nahomi Catalan MD  Utilization Review  Physician Advisor  [facility]

## 2022-04-16 NOTE — PROGRESS NOTES
"  HEART CARE CONSULTATON NOTE        Assessment/Recommendations   Assessment:  1.  Acute on chronic heart failure with reduced ejection fraction: Responded to diuresis  2.  Severe end-stage dilated cardiomyopathy  3.  Paroxysmal atrial fibrillation    Plan:  1.  We will change to p.o. Lasix 40 mg twice daily rather than once a day  2.  Continue on metoprolol, low-dose hydralazine and low-dose isosorbide.  Medication titration limited by blood pressure  3.  Possible discharge on Monday to her care facility where she will be on hospice.  No further cardiac recommendations.      History of Present Illness/Subjective    Patient denies any problems with breathing.  No lower extremity edema noted.  Denies chest pain       Physical Examination  Review of Systems   VITALS: /58   Pulse 84   Temp 97.7  F (36.5  C) (Oral)   Resp 16   Ht 1.702 m (5' 7\")   Wt 36.5 kg (80 lb 6.4 oz)   SpO2 96%   BMI 12.59 kg/m    BMI: Body mass index is 12.59 kg/m .  Wt Readings from Last 3 Encounters:   04/16/22 36.5 kg (80 lb 6.4 oz)   08/07/20 43.5 kg (96 lb)   01/03/20 45.8 kg (101 lb)       Intake/Output Summary (Last 24 hours) at 4/16/2022 1243  Last data filed at 4/16/2022 0948  Gross per 24 hour   Intake 813.5 ml   Output 1650 ml   Net -836.5 ml     General Appearance:   no distress, normal body habitus   ENT/Mouth: membranes moist, no oral lesions or bleeding gums.      EYES:  no scleral icterus, normal conjunctivae   Neck: no carotid bruits or thyromegaly   Chest/Lungs:   lungs are clear to auscultation   Cardiovascular:   Regular. Normal first and second heart sounds with no murmurs   Abdomen:  no organomegaly, masses, bruits, or tenderness; bowel sounds are present   Extremities: no cyanosis or clubbing   Skin: no xanthelasma, warm.    Neurologic: normal  bilateral, no tremors     Psychiatric: alert and oriented x3, calm     Review Of Systems  Skin: negative  Eyes: negative  Ears/Nose/Throat: negative  Respiratory: No " shortness of breath, dyspnea on exertion, cough, or hemoptysis  Cardiovascular: negative  Gastrointestinal: negative  Genitourinary: negative  Musculoskeletal: negative  Neurologic: negative  Psychiatric: negative  Hematologic/Lymphatic/Immunologic: negative  Endocrine: negative          Lab Results    Chemistry/lipid CBC Cardiac Enzymes/BNP/TSH/INR   No results for input(s): CHOL, HDL, LDL, TRIG, CHOLHDLRATIO in the last 78493 hours.  No results for input(s): LDL in the last 78684 hours.  Recent Labs   Lab Test 04/16/22  0507      POTASSIUM 5.2*   CHLORIDE 100   CO2 24   GLC 72   BUN 60*   CR 1.50*   GFRESTIMATED 31*   COURTNEY 8.7     Recent Labs   Lab Test 04/16/22  0507 04/15/22  0457 04/14/22  1325   CR 1.50* 1.49* 1.72*     No results for input(s): A1C in the last 08675 hours.       Recent Labs   Lab Test 04/14/22  1325   WBC 8.9   HGB 12.2   HCT 39.4   *        Recent Labs   Lab Test 04/14/22  1325 08/07/20  1214 01/03/20  1445   HGB 12.2 12.7 12.3    Recent Labs   Lab Test 04/14/22  1325 03/09/19  0541 03/08/19  1451   TROPONINI 0.04 0.19 0.11     Recent Labs   Lab Test 04/14/22  1325 01/03/20  1445 06/27/19  1824   BNP 4,354* 1,470* 1,668*     Recent Labs   Lab Test 03/09/19  0541   TSH 2.51     Recent Labs   Lab Test 06/29/19  0604 06/28/19  0558 06/27/19  1824   INR 1.14* 1.12* 1.06        Medical History  Surgical History Family History Social History   Past Medical History:   Diagnosis Date     Abnormal ECG      Atrial fibrillation (H)      CHF (congestive heart failure) (H)      Closed left hip fracture, initial encounter (H) 6/27/2019     DNR (do not resuscitate)      HTN (hypertension)      Nonsmoker      Osteoporosis     Completed 5 years of alendronate therapy.     Paroxysmal atrial fibrillation (H)      Uses walker 1/5/2020     Past Surgical History:   Procedure Laterality Date     APPENDECTOMY       ZZC PERCUT FIX PROX/NECK FEMUR FX Left 6/28/2019    Procedure: INTERNAL FIXATION,  FRACTURE, HIP, WITH PINS OR NAILS;  Surgeon: Rohith Galeano MD;  Location: [facility] OR;  Service: Orthopedics     Family History   Problem Relation Age of Onset     Cerebrovascular Disease Mother      Leukemia Father         Social History     Socioeconomic History     Marital status:      Spouse name: Not on file     Number of children: 0     Years of education: Not on file     Highest education level: Not on file   Occupational History     Not on file   Tobacco Use     Smoking status: Never Smoker     Smokeless tobacco: Never Used   Substance and Sexual Activity     Alcohol use: Yes     Comment: Alcoholic Drinks/day: not regularly     Drug use: No     Sexual activity: Not on file   Other Topics Concern     Not on file   Social History Narrative    Former patient of Dr. Damaris Billings.    No children.  Former .    POA for medical decisions is Nephaditya Villarreal.    Contact Alida Villarreal for results or issues - may leave message.  Home - [phone]  Cell phone - [phone]    Moved to  [facility] in 2019.  Walks with a walker.  Remains active.     Social Determinants of Health     Financial Resource Strain: Not on file   Food Insecurity: Not on file   Transportation Needs: Not on file   Physical Activity: Not on file   Stress: Not on file   Social Connections: Not on file   Intimate Partner Violence: Not on file   Housing Stability: Not on file         Medications  Allergies   No current outpatient medications on file.        Allergies   Allergen Reactions     Penicillins Swelling         Fabi Lopez MD

## 2022-04-16 NOTE — PROGRESS NOTES
[facility] Progress Note      ASSESSMENT:    Principal Problem:    Acute on chronic systolic congestive heart failure (H)  Active Problems:    DNR (do not resuscitate) / DNI    HTN (hypertension)    Paroxysmal atrial fibrillation (H) - single episode 3/19    Dilated cardiomyopathy (H)    Acute kidney injury superimposed on CKD (H)    Gastroesophageal reflux disease with esophagitis      PLAN:   89-year-old female with history of paroxysmal A. fib, dilated cardiomyopathy, chronic systolic CHF, CKD and GERD presents with shortness of breath and found to have acute on chronic systolic CHF.     Shortness of breath: Likely secondary to acute on chronic systolic CHF given markedly elevated BNP with complaints of orthopnea.  Chest x-ray shows no evidence to corroborate CHF and does show a spiculated density left upper lobe.  Troponin negative.  EKG without concerning ischemic findings.  Most recent TTE from 2019 showed EF of 20% with moderate to severe mitral regurgitation and mild aortic stenosis with mild aortic insufficiency.  STAT echo ordered on admission reportedly shows further decrease in EF to 10 to 20% compared to 20% in 2019, there is also findings of severe mitral regurg compared to moderate to severe mitral regurg in 2019.  Formal TTE read reviewed and notes actually increased LVEF of 30-35% with decreased RV function, severe 3+ MR, moderate mitral and aortic stenosis  --Cardiology transitioning off lasix drip to intermittent p.o. Lasix  --Continue imdur, hydralazine   --changed home metoprolol 25 bid to Toprol 25mg daily  --Hold home lisinopril indefinitely given YASMEEN on CKD and advanced age  --Family requested hospice consultation which was done on 4/15/2022.  Current plan is to discharge back to her assisted living facility 4/18/22 and assume hospice cares at that time.  --She currently is not on comfort cares but would initiate comfort cares in the hospital if she has any clinical  "deterioration          YASMEEN on CKD: Suspect related to cardiorenal physiology, improved with diuresis  --Hold home lisinopril indefinitely  --Monitor GFR in the a.m.        Spiculated density left upper lobe: Incidentally found on chest x-ray, can consider CT of the chest however with plans for hospice will defer imaging at this time        Paroxysmal A. fib:   -- continue full-strength aspirin  -- changed home metoprolol 25 bid to Toprol 25 mg daily per cadiology       History of GERD: Continue home PPI        DVT PPX: SCD, ambulate     Code status: DNR confirmed on admission              Espinoza Hunter D.O.                 -------------------------------------------------------------------------------------------------------------  SUBJECTIVE: NAD.  Breathing continues to improve. Denies any nausea, vomiting, abdominal pain, chest pain, new swelling, fevers, chills, confusion or headache.   Having some LE cramping improved with tylenol    Exam:  /58   Pulse 84   Temp 97.7  F (36.5  C) (Oral)   Resp 16   Ht 1.702 m (5' 7\")   Wt 36.5 kg (80 lb 6.4 oz)   SpO2 96%   BMI 12.59 kg/m    General: NAD  RESPIRATORY: Breathing nonlabored  CARDIOVASCULAR: No le edema bilat.   ABDOMEN: soft and non-tender  NEUROLOGIC: Motor intact, speech clear      Diagnostics Reviewed:      Recent Results (from the past 24 hour(s))   Basic metabolic panel    Collection Time: 04/16/22  5:07 AM   Result Value Ref Range    Sodium 138 136 - 145 mmol/L    Potassium 5.2 (H) 3.5 - 5.0 mmol/L    Chloride 100 98 - 107 mmol/L    Carbon Dioxide (CO2) 24 22 - 31 mmol/L    Anion Gap 14 5 - 18 mmol/L    Urea Nitrogen 60 (H) 8 - 28 mg/dL    Creatinine 1.50 (H) 0.60 - 1.10 mg/dL    Calcium 8.7 8.5 - 10.5 mg/dL    Glucose 72 70 - 125 mg/dL    GFR Estimate 31 (L) >60 mL/min/1.73m2   Magnesium    Collection Time: 04/16/22  5:07 AM   Result Value Ref Range    Magnesium 2.2 1.8 - 2.6 mg/dL       "

## 2022-04-16 NOTE — PLAN OF CARE
PRIMARY DIAGNOSIS: CONGESTIVE HEART FAILURE  OUTPATIENT/OBSERVATION GOALS TO BE MET BEFORE DISCHARGE:  Dyspnea improved and O2 sats >88% at RA or at prior home O2 therapy level: Yes        SpO2: 96 %, O2 Device: Nasal cannula  Vitals:    04/14/22 1305 04/14/22 2229 04/15/22 0457   Weight: 49.4 kg (109 lb) 38.3 kg (84 lb 6.4 oz) 39.2 kg (86 lb 8 oz)        ECHO and other diagnostic testing complete (if applicable): N/A    Return to near baseline physical activity: No    Discharge Planner Nurse   Safe discharge environment identified: No  Barriers to discharge: Yes, Lasix drip discontinued. Started IVP Lasix.        Entered by: Brown Gallegos 04/15/2022 10:21 PM     Please review provider order for any additional goals.   Nurse to notify provider when observation goals have been met and patient is ready for discharge.Goal Outcome Evaluation:

## 2022-04-16 NOTE — PLAN OF CARE
Problem: Plan of Care - These are the overarching goals to be used throughout the patient stay.    Goal: Plan of Care Review/Shift Note  Description: The Plan of Care Review/Shift note should be completed every shift.  The Outcome Evaluation is a brief statement about your assessment that the patient is improving, declining, or no change.  This information will be displayed automatically on your shift note.  Outcome: Ongoing, Progressing     Problem: Dysrhythmia (Heart Failure)  Goal: Stable Heart Rate and Rhythm  Outcome: Ongoing, Progressing     Problem: Fluid Imbalance (Heart Failure)  Goal: Fluid Balance  Outcome: Ongoing, Progressing     Problem: Functional Ability Impaired (Heart Failure)  Goal: Optimal Functional Ability  Intervention: Optimize Functional Ability  Recent Flowsheet Documentation  Taken 4/16/2022 0010 by Fabi Chan, RN  Activity Management: activity adjusted per tolerance     Problem: Respiratory Compromise (Heart Failure)  Goal: Effective Oxygenation and Ventilation  Outcome: Ongoing, Progressing  Intervention: Promote Airway Secretion Clearance  Recent Flowsheet Documentation  Taken 4/16/2022 0010 by Faib Chan, RN  Cough And Deep Breathing: done independently per patient   Goal Outcome Evaluation:      Patient denies pain at this time. Rhythm=sinus arrhythmia with PVC's, lungs diminished with 02 sat's 98% on 1 liter nasal cannula. IV lasix 40 mg's given, bp 120/61 prior to giving and purwik in place. Patient is down 6 lbs. Will cont to monitor.

## 2022-04-16 NOTE — PROGRESS NOTES
Care Management Follow Up    Length of Stay (days): 0    Expected Discharge Date: 04/18/2022    Concerns to be Addressed:   IV Lasix every 8 hours (changing to PO today per cardiology notes).   Patient plan of care discussed at interdisciplinary rounds: Yes    Anticipated Discharge Disposition:  Assisted Living     Anticipated Discharge Services:  Hospice  Anticipated Discharge DME:  Per therapy (if indicated).    Patient/family educated on Medicare website which has current facility and service quality ratings:  Yes   Education Provided on the Discharge Plan:  Per team  Patient/Family in Agreement with the Plan:  Yes    Referrals Placed by CM/WOO:  [facility]; [facility];   Private pay costs discussed: Not applicable     Additional Information:  Per [facility] notes, plan is for patient to return to her assisted living on hospice. [facility] can accept with SOC on Monday 4/18/2022. Her great nephew Malvin is primary family contact. Planning medical transport.     Rachelle Arredondo RN

## 2022-04-17 NOTE — PLAN OF CARE
"  Problem: Plan of Care - These are the overarching goals to be used throughout the patient stay.    Goal: Plan of Care Review/Shift Note  Description: The Plan of Care Review/Shift note should be completed every shift.  The Outcome Evaluation is a brief statement about your assessment that the patient is improving, declining, or no change.  This information will be displayed automatically on your shift note.  Outcome: Ongoing, Progressing  Goal: Patient-Specific Goal (Individualized)  Description: You can add care plan individualizations to a care plan. Examples of Individualization might be:  \"Parent requests to be called daily at 9am for status\", \"I have a hard time hearing out of my right ear\", or \"Do not touch me to wake me up as it startles me\".  Outcome: Ongoing, Progressing  Goal: Absence of Hospital-Acquired Illness or Injury  Outcome: Ongoing, Progressing  Intervention: Identify and Manage Fall Risk  Recent Flowsheet Documentation  Taken 4/17/2022 1739 by Kina Dee, RN  Safety Promotion/Fall Prevention:    activity supervised    room door open    nonskid shoes/slippers when out of bed    lighting adjusted    mobility aid in reach    supervised activity  Taken 4/17/2022 1213 by Kina Dee, RN  Safety Promotion/Fall Prevention:    activity supervised    room door open    nonskid shoes/slippers when out of bed    lighting adjusted    mobility aid in reach    supervised activity  Taken 4/17/2022 0728 by Kina Dee, RN  Safety Promotion/Fall Prevention:    activity supervised    room door open    nonskid shoes/slippers when out of bed    lighting adjusted    mobility aid in reach    supervised activity  Intervention: Prevent and Manage VTE (Venous Thromboembolism) Risk  Recent Flowsheet Documentation  Taken 4/17/2022 1739 by Kina Dee, RN  Activity Management: activity adjusted per tolerance  Taken 4/17/2022 1213 by Kina Dee, RN  Activity Management: activity adjusted per " tolerance  Taken 4/17/2022 0728 by Kina Dee, RN  Activity Management: activity adjusted per tolerance  Goal: Optimal Comfort and Wellbeing  Outcome: Ongoing, Progressing   Goal Outcome Evaluation:   Patient confused  and kept calling her friend   Go  because she say she is lonely and want somebody  to talk to  Go came   .  When Go left patient started saying she wanted to talk to somebody ans was teary stayed with patient placed  TV on the care channel and she says Music helps .

## 2022-04-17 NOTE — PLAN OF CARE
Goal Outcome Evaluation:  Assumed care 2300 to 0730. A&O x 4. Assist x 1 with a walker and gait belt. Denies pain, but complains of generalized discomfort. PRN Tylenol given (see MAR). Purewick in place. 1.5L O2 via nasal cannula. Call light within reach, able to make needs known. Bed alarm on for safety.    Problem: Functional Ability Impaired (Heart Failure)  Goal: Optimal Functional Ability  Intervention: Optimize Functional Ability  Recent Flowsheet Documentation  Taken 4/17/2022 0400 by GISEL COLBY  Activity Management: activity adjusted per tolerance

## 2022-04-17 NOTE — PROGRESS NOTES
[facility]    Medicine Progress Note - Hospitalist Service    Date of Admission:  4/14/2022    Assessment & Plan          89-year-old female with history of paroxysmal A. fib, dilated cardiomyopathy, chronic systolic CHF, CKD and GERD presents with shortness of breath and found to have acute on chronic systolic CHF.     1.Shortness of breath: Likely secondary to acute on chronic systolic CHF given markedly elevated BNP with complaints of orthopnea.  Chest x-ray shows no evidence to corroborate CHF and does show a spiculated density left upper lobe.  Troponin negative.  EKG without concerning ischemic findings.  -Formal TTE read reviewed and notes actually increased LVEF of 30-35% with decreased RV function, severe 3+ MR, moderate mitral and aortic stenosis  -Cardiology transitioned off lasix drip to intermittent p.o. Lasix  -Continue imdur, hydralazine   -changed home metoprolol 25 bid to Toprol 25mg daily  -Hold home lisinopril -and will not restart  given YASMEEN on CKD and advanced age  -Family requested hospice consultation which was done on 4/15/2022.  Current plan is to discharge back to her assisted living facility 4/18/22 and assume hospice cares at that time--will be with [facility]    --She currently is not on comfort cares but would initiate comfort cares in the hospital if she has any clinical deterioration     2.YASMEEN on CKD: Suspect related to cardiorenal physiology, improved with diuresis  -Hold home lisinopril do not restart  -creat is up today 1.73     3.Spiculated density left upper lobe: Incidentally found on chest x-ray, can consider CT of the chest however with plans for hospice will defer imaging at this time--will not work up     4.Paroxysmal A. fib:   - continue full-strength aspirin  - changed home metoprolol 25 bid to Toprol 25 mg daily per cadiology     5.History of GERD: Continue home PPI    6.Underweight  -bmi 12          Code status: DNR confirmed on  admission       Goals of care--discussed with great nephew Malvin, who is her POA--no escalation of cares, no ICU. Will get hospice to see here with  hospice and need to find place 24/7 for her with hospice likely          Diet: Combination Diet 2 gm NA Diet  Room Service    DVT Prophylaxis: Pneumatic Compression Devices  Perez Catheter: Not present  Central Lines: None  Cardiac Monitoring: None  Code Status: No CPR- Do NOT Intubate      Disposition Plan   Expected Discharge: 04/18/2022     Anticipated discharge location: assisted living    Delays:     Placement - LTC            The patient's care was discussed with the Patient.  I called nephew and explained my concerns for Pinky, needs likely 24/7 cares, and hospice--I have asked SW to reach out to  Hospice to see here and help find a place that can provide her needs    Phyllis German MD  Hospitalist Service  [facility]  Securely message with the Vocera Web Console (learn more here)  Text page via [employer] Paging/Directory         Clinically Significant Risk Factors Present on Admission                # Platelet Defect: home medication list includes an antiplatelet medication       ______________________________________________________________________    Interval History   She has no cp or sob at rest  She has no new complaints  Feels overall tired      Data reviewed today: I reviewed all medications, new labs and imaging results over the last 24 hours. I personally reviewed no images or EKG's today.    Physical Exam   Vital Signs: Temp: 97.6  F (36.4  C) Temp src: Oral BP: 110/53 Pulse: 82   Resp: 20 SpO2: 94 % O2 Device: Nasal cannula Oxygen Delivery: 1.5 LPM  Weight: 78 lbs 3.2 oz  Constitutional: awake, fatigued, alert, cooperative and no apparent distress  Respiratory: no increased work of breathing, good air exchange, no retractions and diminished breath sounds throughout lungs  Cardiovascular: Normal apical impulse,  regular rate and rhythm, normal S1 and S2, no S3 or S4, and no murmur noted  GI: normal bowel sounds, soft, non-distended and non-tender  Skin: no bruising or bleeding  Musculoskeletal: no lower extremity pitting edema present  Neurologic: Mental Status Exam:  Level of Alertness:   awake  Neuropsychiatric: General: normal, calm and normal eye contact    Data   Recent Labs   Lab 04/17/22  0506 04/16/22  0507 04/15/22  0457 04/14/22  1325   WBC  --   --   --  8.9   HGB  --   --   --  12.2   MCV  --   --   --  101*   PLT  --   --   --  297    138 139 139   POTASSIUM 4.1 5.2* 4.9 5.6*   CHLORIDE 98 100 104 105   CO2 26 24 21* 20*   BUN 68* 60* 58* 56*   CR 1.73* 1.50* 1.49* 1.72*   ANIONGAP 12 14 14 14   COURTNEY 8.5 8.7 9.0 8.8   GLC 92 72 87 113   ALBUMIN  --   --   --  3.0*   PROTTOTAL  --   --   --  6.8   BILITOTAL  --   --   --  0.3   ALKPHOS  --   --   --  73   ALT  --   --   --  20   AST  --   --   --  24     No results found for this or any previous visit (from the past 24 hour(s)).

## 2022-04-17 NOTE — PLAN OF CARE
Telemetry rhythm is sinus dysrhythmia, PAC's and PVC and BBB.  Some pain with muscle cramping, orange juice given and warm blanket, massaging, tylenol and aspirin given, eventually cramps subsided.   Up in chair, poor appetite.  Bp soft in am, waited an bp came back up and bp medications given.  Lasix changed to oral.  On 1.5 liters oxygen, O2 saturation 96%.  Will continue to monitor.   Problem: Risk for Delirium  Goal: Optimal Coping  Outcome: Ongoing, Progressing     Problem: Functional Ability Impaired (Heart Failure)  Goal: Optimal Functional Ability  Outcome: Ongoing, Progressing  Intervention: Optimize Functional Ability  Recent Flowsheet Documentation  Taken 4/16/2022 1700 by Alexandrea Jimenez, RN  Activity Management:    up in chair    activity adjusted per tolerance  Taken 4/16/2022 1000 by Alexandrea Jimenez, RN  Activity Management: activity adjusted per tolerance   Goal Outcome Evaluation:

## 2022-04-17 NOTE — PROGRESS NOTES
Care Management Follow Up    Length of Stay (days): 1    Expected Discharge Date: 04/18/2022     Concerns to be Addressed:     Placement; home safety with hospice  Patient plan of care discussed at interdisciplinary rounds: Yes    Anticipated Discharge Disposition: Needs increased care from [facility] -- Anticipate move to LTC vs. Hospice house     Anticipated Discharge Services:  Hospice  Anticipated Discharge DME:          Referrals Placed by CM/SW: Hospice  Private pay costs discussed: transportation costs    Additional Information:      Previous Goal for pt to return to [facility] w/ [facility] on Monday 4/18. Per MD and [facility] consuelo Coombs, [facility] NOT [facility]. [facility]M left VM for [facility] to discuss discharge plan. Pt will likely need hospice house or LTC w/hospice to follow.     Awaiting callback from [facility]. - Return call from triage nurse who will relay information to intake about coming to hospital Monday to coordinate intake assessment.       Amelia Cazares, CHADSW

## 2022-04-18 NOTE — PLAN OF CARE
Problem: Risk for Delirium  Goal: Improved Behavioral Control  Outcome: Ongoing, Progressing       Alert confused, oriented mainly to person, occasionally to place, yells out when lonely, doesn't like to be alone, has visitor in room and she is calm

## 2022-04-18 NOTE — PROGRESS NOTES
Spoke to Brittany from [facility]. Says she spoke to consuelo Coombs and agrees that patient needs LTC. Says he is agreeable to referrals to [facility] , and [facility]. Says he is aware it would be private pay but wants to know pricing at specific facilities     2:00 PM  Consuelo Coombs called and said they would be interested in [facility]. Says he called and they told him there is a bed open. Referral sent.     Spoke to Toney at [facility]. She will reach out to consuelo about patient possibly moving to their [facility]    Currently options for discharge are:  LTC- referrals sent  Hospice home- referral sent to [facility] in [facility].     3:11 PM  Earline at [facility] accepted patient for admission tomorrow. She will reach out to consuelo Coombs to discuss cost and will call CM back to confirm they have accepted the bed. Says they need 1-2 days worth of meds sent with patient. Needs to clarify if they just need comfort meds sent with patient or all meds.  Awaiting call back from Earline

## 2022-04-18 NOTE — PLAN OF CARE
Problem: Plan of Care - These are the overarching goals to be used throughout the patient stay.    Goal: Plan of Care Review/Shift Note  Description: The Plan of Care Review/Shift note should be completed every shift.  The Outcome Evaluation is a brief statement about your assessment that the patient is improving, declining, or no change.  This information will be displayed automatically on your shift note.  4/18/2022 1658 by Lottie Robles RN  Outcome: Ongoing, Progressing  4/18/2022 1355 by Lottie Robles RN  Outcome: Ongoing, Progressing   Goal Outcome Evaluation:           Has been sleeping on/off all day, has been cooperative with cares, plan for discharge tomorrow with hospice

## 2022-04-18 NOTE — PLAN OF CARE
Goal Outcome Evaluation: [facility] care 1900 to 0730. A&O x 3, disoriented to time. Assist x 1 with a gait belt and walker. Denies pain. Purewick in place. Pt like to sit at bedside. PRN melatonin given (see MAR). Pt is focused on calling nephew (Malvin) and friend (Go). Pt slept for the majority of the night. Call light within reach, able to make needs known. Bed alarm on for safety.    Problem: Risk for Delirium  Goal: Optimal Coping  Outcome: Ongoing, Progressing     Problem: Sleep Disordered Breathing (Heart Failure)  Goal: Effective Breathing Pattern During Sleep  Outcome: Ongoing, Progressing

## 2022-04-18 NOTE — PROGRESS NOTES
[facility]    Medicine Progress Note - Hospitalist Service    Date of Admission:  4/14/2022    Assessment & Plan          89-year-old female with history of paroxysmal A. fib, dilated cardiomyopathy, chronic systolic CHF, CKD and GERD presents with shortness of breath and found to have acute on chronic systolic CHF.     1.Shortness of breath/Acute hypoxic resp failure  - Likely secondary to acute on chronic systolic CHF given markedly elevated BNP with complaints of orthopnea.  Chest x-ray shows no evidence to corroborate CHF and does show a spiculated density left upper lobe.  Troponin negative.  EKG without concerning ischemic findings.  -Formal TTE read reviewed and notes actually increased LVEF of 30-35% with decreased RV function, severe 3+ MR, moderate mitral and aortic stenosis  -Cardiology transitioned off lasix drip to intermittent p.o. Lasix  -Continue imdur, hydralazine   -changed home metoprolol 25 bid to Toprol 25mg daily  -Hold home lisinopril -and will not restart  given YASMEEN on CKD and advanced age  -Family requested hospice consultation which was done on 4/15/2022.  Current plan is to discharge back to her assisted living facility 4/18/22 and assume hospice cares at that time--will be with [facility]     --She currently is not on comfort cares but would initiate comfort cares in the hospital if she has any clinical deterioration     2.YASMEEN on CKD stage 3 : Suspect related to cardiorenal physiology, improved with diuresis  -Hold home lisinopril do not restart  -creat is up today 1.73-->1.6 today     3.Spiculated density left upper lobe: Incidentally found on chest x-ray, can consider CT of the chest however with plans for hospice will defer imaging at this time--will not work up     4.Paroxysmal A. fib:   - continue full-strength aspirin  - changed home metoprolol 25 bid to Toprol 25 mg daily per cadiology     5.History of GERD: Continue home PPI     6.Underweight  -bmi  12  -no escalation of cares    7.Hyperkalemia  -on admit--resolved now 3.9       Goals of care--need hospice to see here to come up with plan for 24/7 cares which she needs  -I do not feel AL is going to be enough care for her    --I am ordering hospice meds to be delivered here so they are ready when she discharges to LTC--I also discussed use of these meds with nephew        Code status: DNR confirmed on admission       Diet: Combination Diet 2 gm NA Diet  Room Service    DVT Prophylaxis: Pneumatic Compression Devices  Perez Catheter: Not present  Central Lines: None  Cardiac Monitoring: None  Code Status: No CPR- Do NOT Intubate      Disposition Plan   Expected Discharge: 04/20/2022     Anticipated discharge location: assisted living    Delays:     Placement - LTC            The patient's care was discussed with the Care Coordinator/ and Patient.  I updated nephew at 1230    Phyllis German MD  Hospitalist Service  [facility]  Securely message with the Vocera Web Console (learn more here)  Text page via [employer] Paging/Directory         Clinically Significant Risk Factors Present on Admission                 ______________________________________________________________________    Interval History   She feels tired  No cp   no sob at rest  No new complaints    Data reviewed today: I reviewed all medications, new labs and imaging results over the last 24 hours. I personally reviewed no images or EKG's today.    Physical Exam   Vital Signs: Temp: 98.3  F (36.8  C) Temp src: Oral BP: 106/59 Pulse: 95   Resp: 20 SpO2: 95 % O2 Device: Nasal cannula Oxygen Delivery: 1.5 LPM  Weight: 78 lbs 3.2 oz  Constitutional: awake, fatigued, alert, cooperative and no apparent distress  Respiratory: no increased work of breathing, good air exchange, no retractions and diminished breath sounds throughout lungs  Cardiovascular: Normal apical impulse, regular rate and rhythm, normal S1 and S2, no  S3 or S4, and no murmur noted  GI: normal bowel sounds, soft, non-distended and non-tender  Skin: no bruising or bleeding  Musculoskeletal: no lower extremity pitting edema present  Neurologic: Mental Status Exam:  Level of Alertness:   awake  Neuropsychiatric: General: normal, calm and normal eye contact    Data   Recent Labs   Lab 04/18/22  0509 04/17/22  0506 04/16/22  0507 04/15/22  0457 04/14/22  1325   WBC  --   --   --   --  8.9   HGB  --   --   --   --  12.2   MCV  --   --   --   --  101*   PLT  --   --   --   --  297    136 138   < > 139   POTASSIUM 3.9 4.1 5.2*   < > 5.6*   CHLORIDE 98 98 100   < > 105   CO2 28 26 24   < > 20*   BUN 68* 68* 60*   < > 56*   CR 1.62* 1.73* 1.50*   < > 1.72*   ANIONGAP 13 12 14   < > 14   COURTNEY 8.7 8.5 8.7   < > 8.8   GLC 89 92 72   < > 113   ALBUMIN  --   --   --   --  3.0*   PROTTOTAL  --   --   --   --  6.8   BILITOTAL  --   --   --   --  0.3   ALKPHOS  --   --   --   --  73   ALT  --   --   --   --  20   AST  --   --   --   --  24    < > = values in this interval not displayed.     No results found for this or any previous visit (from the past 24 hour(s)).

## 2022-04-19 NOTE — TELEPHONE ENCOUNTER
Yes I can do this.    Douglas Sosa MD  General Internal Medicine  [facility]  4/19/2022, 4:38 PM

## 2022-04-19 NOTE — TELEPHONE ENCOUNTER
Anna with [facility] patient has been admitted for services today.  Need to confirm that Dr. Sosa will follow and sign CTI and Death Certificate.    Call back number is

## 2022-04-19 NOTE — PROGRESS NOTES
Physical Therapy Discharge Summary    Reason for therapy discharge:    Discharged to hospice care    Progress towards therapy goal(s). See goals on Care Plan in [facility] electronic health record for goal details.  Goals not met.  Barriers to achieving goals:   limited tolerance for therapy.    Therapy recommendation(s):    No further therapy is recommended.

## 2022-04-19 NOTE — PROGRESS NOTES
[facility]    Medicine Progress Note - Hospitalist Service    Date of Admission:  4/14/2022    Assessment & Plan          89-year-old female with history of paroxysmal A. fib, dilated cardiomyopathy, chronic systolic CHF, CKD and GERD presents with shortness of breath and found to have acute on chronic systolic CHF.     1.Shortness of breath/Acute hypoxic resp failure  - Likely secondary to acute on chronic systolic CHF given markedly elevated BNP with complaints of orthopnea.  Chest x-ray shows no evidence to corroborate CHF and does show a spiculated density left upper lobe.  Troponin negative.  EKG without concerning ischemic findings.  -Formal TTE read reviewed and notes actually increased LVEF of 30-35% with decreased RV function, severe 3+ MR, moderate mitral and aortic stenosis  -Cardiology transitioned off lasix drip to intermittent p.o. Lasix  -Continue imdur, hydralazine   -changed home metoprolol 25 bid to Toprol 25mg daily  -Hold home lisinopril -and will not restart  given YASMEEN on CKD and advanced age  --will be with [facility]--needs LTC     --She currently is not on comfort cares but would initiate comfort cares in the hospital if she has any clinical deterioration     2.YASMEEN on CKD stage 3 : Suspect related to cardiorenal physiology, improved with diuresis  -Hold home lisinopril do not restart  -creat is up today 1.73-->1.53 today     3.Spiculated density left upper lobe: Incidentally found on chest x-ray, can consider CT of the chest however with plans for hospice will defer imaging at this time--will not work up     4.Paroxysmal A. fib:   - continue full-strength aspirin  - changed home metoprolol 25 bid to Toprol 25 mg daily per cadiology     5.History of GERD: Continue home PPI     6.Underweight--extreme  -bmi 12  -no escalation of cares     7.Hyperkalemia  -on admit--resolved now 3.9        Goals of care--need hospice to see here to come up with plan for 24/7 cares  which she needs  -I do not feel AL is going to be enough care for her     --hospice meds ordered on 4/18        Code status: DNR confirmed on admission          Diet: Combination Diet 2 gm NA Diet  Room Service    DVT Prophylaxis: Pneumatic Compression Devices  Perez Catheter: Not present  Central Lines: None  Cardiac Monitoring: None  Code Status: No CPR- Do NOT Intubate      Disposition Plan   Expected Discharge: 04/19/2022     Anticipated discharge location: assisted living    Delays:     Placement - LTC            The patient's care was discussed with the Care Coordinator/ and Patient.    Phyllis German MD  Hospitalist Service  [facility]  Securely message with the Vocera Web Console (learn more here)  Text page via [employer] Paging/Directory         Clinically Significant Risk Factors Present on Admission                 ______________________________________________________________________    Interval History   She has no complaints  No cp  No sob at rest      Data reviewed today: I reviewed all medications, new labs and imaging results over the last 24 hours. I personally reviewed no images or EKG's today.    Physical Exam   Vital Signs: Temp: 97.2  F (36.2  C) Temp src: Oral BP: 105/59 Pulse: 90   Resp: 16 SpO2: 94 % O2 Device: Nasal cannula Oxygen Delivery: 1.5 LPM  Weight: 72 lbs 8 oz  Constitutional: awake, fatigued, alert, cooperative and no apparent distress, very thin, muscle wasting  Respiratory: No increased work of breathing, good air exchange, clear to auscultation bilaterally, no crackles or wheezing  Cardiovascular: Normal apical impulse, regular rate and rhythm, normal S1 and S2, no S3 or S4, and no murmur noted  GI: normal bowel sounds, soft, non-distended and non-tender  Skin: no bruising or bleeding  Musculoskeletal: no lower extremity pitting edema present  Neurologic: Mental Status Exam:  Level of Alertness:   awake  Neuropsychiatric: General: normal,  calm and normal eye contact    Data   Recent Labs   Lab 04/19/22  0516 04/18/22  0509 04/17/22  0506 04/15/22  0457 04/14/22  1325   WBC  --   --   --   --  8.9   HGB  --   --   --   --  12.2   MCV  --   --   --   --  101*   PLT  --   --   --   --  297    139 136   < > 139   POTASSIUM 4.0 3.9 4.1   < > 5.6*   CHLORIDE 99 98 98   < > 105   CO2 31 28 26   < > 20*   BUN 69* 68* 68*   < > 56*   CR 1.53* 1.62* 1.73*   < > 1.72*   ANIONGAP 9 13 12   < > 14   COURTNEY 8.7 8.7 8.5   < > 8.8   GLC 84 89 92   < > 113   ALBUMIN  --   --   --   --  3.0*   PROTTOTAL  --   --   --   --  6.8   BILITOTAL  --   --   --   --  0.3   ALKPHOS  --   --   --   --  73   ALT  --   --   --   --  20   AST  --   --   --   --  24    < > = values in this interval not displayed.     No results found for this or any previous visit (from the past 24 hour(s)).

## 2022-04-19 NOTE — PLAN OF CARE
Problem: Plan of Care - These are the overarching goals to be used throughout the patient stay.    Goal: Absence of Hospital-Acquired Illness or Injury  Outcome: Ongoing, Progressing  Intervention: Identify and Manage Fall Risk  Recent Flowsheet Documentation  Taken 4/19/2022 0015 by Malvin Potts, RN  Safety Promotion/Fall Prevention:    supervised activity    safety round/check completed    room near nurse's station    patient and family education    nonskid shoes/slippers when out of bed    fall prevention program maintained    clutter free environment maintained    bed alarm on    activity supervised  Taken 4/18/2022 2100 by Malvin Potts, RN  Safety Promotion/Fall Prevention:    supervised activity    safety round/check completed    room near nurse's station    patient and family education    nonskid shoes/slippers when out of bed    fall prevention program maintained    clutter free environment maintained    bed alarm on    activity supervised  Intervention: Prevent and Manage VTE (Venous Thromboembolism) Risk  Recent Flowsheet Documentation  Taken 4/19/2022 0015 by Malvin Potts, RN  Activity Management: activity adjusted per tolerance  Taken 4/18/2022 2100 by Malvin Potts, RN  Activity Management: activity adjusted per tolerance  Goal: Optimal Comfort and Wellbeing  Outcome: Ongoing, Progressing     Problem: Dysrhythmia (Heart Failure)  Goal: Stable Heart Rate and Rhythm  Outcome: Ongoing, Progressing     Problem: Risk for Delirium  Goal: Improved Sleep  Outcome: Ongoing, Progressing   Goal Outcome Evaluation:    Patient is oriented to self and place. She denies pain upon assessment and has been mainly sleeping. Purewick remains in place bu she has mainly been incontinent. Turned and repositioned. Sacral foam dressing in place on coccyx.

## 2022-04-19 NOTE — PROGRESS NOTES
Care Management Discharge Note    Discharge Date: 04/19/2022       Discharge Disposition: Hospice    Discharge Services: None    Discharge DME:  Per hospice    Discharge Transportation: [facility]       PAS Confirmation Code:  n/a       Education Provided on the Discharge Plan:  yes  Persons Notified of Discharge Plans:  yes    Patient/Family in Agreement with the Plan: yes    Handoff Referral Completed:  yes    Additional Information:  Patient to discharge to [facility] with [facility].  [facility]. [facility] stretcher ride at 1100. PCS completed.         Walesak Pruett RN

## 2022-04-19 NOTE — DISCHARGE SUMMARY
[facility] MEDICINE  DISCHARGE SUMMARY     Primary Care Physician: Douglas Sosa  Admission Date: 4/14/2022   Discharge Provider: Phyllis German MD Discharge Date: 4/19/2022   Diet:   Active Diet and Nourishment Order   Procedures     Room Service     Combination Diet 2 gm NA Diet     Diet       Code Status: No CPR- Do NOT Intubate   Activity: DCACTIVITY: Activity as tolerated        Condition at Discharge: Guarded     REASON FOR PRESENTATION(See Admission Note for Details)   sob    PRINCIPAL & ACTIVE DISCHARGE DIAGNOSES     Principal Problem:    Acute on chronic systolic congestive heart failure (H)  Active Problems:    DNR (do not resuscitate) / DNI    HTN (hypertension)    Paroxysmal atrial fibrillation (H) - single episode 3/19    Dilated cardiomyopathy (H)    Acute kidney injury superimposed on CKD (H)    Gastroesophageal reflux disease with esophagitis      PENDING LABS     Unresulted Labs Ordered in the Past 30 Days of this Admission     No orders found from 3/15/2022 to 4/15/2022.            PROCEDURES ( this hospitalization only)          RECOMMENDATIONS TO OUTPATIENT PROVIDER FOR F/U VISIT     Follow-up Appointments     Follow Up and recommended labs and tests      Will be managed by [facility]                 DISPOSITION     Skilled Nursing Facility + Hospice    SUMMARY OF HOSPITAL COURSE:        89-year-old female with history of paroxysmal A. fib, dilated cardiomyopathy, chronic systolic CHF, CKD and GERD presents with shortness of breath and found to have acute on chronic systolic CHF.     1.Shortness of breath/Acute hypoxic resp failure  - Likely secondary to acute on chronic systolic CHF given markedly elevated BNP with complaints of orthopnea.  Chest x-ray shows no evidence to corroborate CHF and does show a spiculated density left upper lobe.  Troponin negative.  EKG without concerning ischemic findings.  -Formal TTE read reviewed and notes actually  increased LVEF of 30-35% with decreased RV function, severe 3+ MR, moderate mitral and aortic stenosis  -Cardiology transitioned off lasix drip to intermittent p.o. Lasix  -Continue imdur, hydralazine   -changed home metoprolol 25 bid to Toprol 25mg daily  -Hold home lisinopril -and will not restart  given YASMEEN on CKD and advanced age  --will be with [facility]--needs LTC     --She currently is not on comfort cares but would initiate comfort cares in the hospital if she has any clinical deterioration     2.YASMEEN on CKD stage 3 : Suspect related to cardiorenal physiology, improved with diuresis  -Hold home lisinopril do not restart  -creat is up today 1.73-->1.53 today     3.Spiculated density left upper lobe: Incidentally found on chest x-ray, can consider CT of the chest however with plans for hospice will defer imaging at this time--will not work up     4.Paroxysmal A. fib:   - continue full-strength aspirin  - changed home metoprolol 25 bid to Toprol 25 mg daily per cadiology     5.History of GERD: Continue home PPI     6.Underweight--extreme  -bmi 12  -no escalation of cares     7.Hyperkalemia  -on admit--resolved now 3.9        Goals of care--need hospice to see here to come up with plan for 24/7 cares which she needs  -I do not feel AL is going to be enough care for her     --hospice meds ordered on 4/18        Code status: DNR confirmed on admission    Discharge Medications with Med changes:     Current Discharge Medication List      START taking these medications    Details   atropine 1 % ophthalmic solution Take 1 drop by mouth, place under tongue or place inside cheek every 4 hours as needed for secretions  Qty: 5 mL, Refills: 11    Associated Diagnoses: Disturbance of salivary secretion      bisacodyl (DULCOLAX) 10 MG suppository Place 1 suppository (10 mg) rectally daily as needed for constipation  Qty: 2 suppository, Refills: 11    Associated Diagnoses: Constipation, unspecified constipation type       calcium carbonate (TUMS) 500 MG chewable tablet Take 1 tablet (500 mg) by mouth 4 times daily as needed for heartburn  Qty: 20 tablet, Refills: 0    Associated Diagnoses: Gastroesophageal reflux disease with esophagitis without hemorrhage      hydrALAZINE (APRESOLINE) 25 MG tablet Take 0.5 tablets (12.5 mg) by mouth 2 times daily Hold if sbp<100  Qty: 30 tablet, Refills: 0    Associated Diagnoses: Dilated cardiomyopathy (H)      HYDROmorphone (DILAUDID) 0.5 MG solutab Place 1 tablet (0.5 mg) under the tongue every 6 hours as needed for pain or shortness of breath / dyspnea  Qty: 10 tablet, Refills: 0    Comments: Hospice patient.  Associated Diagnoses: Pain      isosorbide dinitrate (ISORDIL) 10 MG tablet Take 1 tablet (10 mg) by mouth 2 times daily  Qty: 60 tablet, Refills: 0    Associated Diagnoses: Dilated cardiomyopathy (H)      LORazepam (ATIVAN) 2 MG/ML (HIGH CONC) oral solution Take 0.125 mLs (0.25 mg) by mouth or place under tongue every 6 hours as needed for anxiety (restlessness)  Qty: 30 mL, Refills: 0    Associated Diagnoses: Anxiety      melatonin 3 MG tablet Take 1 tablet (3 mg) by mouth nightly as needed for sleep  Qty: 10 tablet, Refills: 0    Associated Diagnoses: Insomnia, unspecified type      metoprolol succinate ER (TOPROL-XL) 25 MG 24 hr tablet Take 1 tablet (25 mg) by mouth daily Hold if sbp<100  Qty: 30 tablet, Refills: 0    Associated Diagnoses: Dilated cardiomyopathy (H)      ondansetron (ZOFRAN-ODT) 4 MG ODT tab Take 1 tablet (4 mg) by mouth every 6 hours as needed for nausea or vomiting  Qty: 30 tablet, Refills: 0    Associated Diagnoses: Nausea         CONTINUE these medications which have CHANGED    Details   acetaminophen (TYLENOL) 325 MG tablet Take 2 tablets (650 mg) by mouth every 6 hours as needed for mild pain or other (and adjunct with moderate or severe pain or per patient request)  Qty: 30 tablet, Refills: 0    Associated Diagnoses: Pain      aspirin (ASA) 325 MG EC tablet  Take 1 tablet (325 mg) by mouth daily  Qty: 30 tablet, Refills: 0    Associated Diagnoses: Paroxysmal atrial fibrillation (H)      benzonatate (TESSALON) 100 MG capsule Take 1 capsule (100 mg) by mouth 3 times daily as needed for cough  Qty: 30 capsule, Refills: 0    Associated Diagnoses: Cough      furosemide (LASIX) 40 MG tablet Take 1 tablet (40 mg) by mouth 2 times daily Hold if sbp<100  Qty: 60 tablet, Refills: 0    Associated Diagnoses: Dilated cardiomyopathy (H)      omeprazole (PRILOSEC) 20 MG DR capsule Take 1 capsule (20 mg) by mouth daily before breakfast  Qty: 30 capsule, Refills: 0    Associated Diagnoses: Melena         STOP taking these medications       lisinopril (PRINIVIL,ZESTRIL) 5 MG tablet Comments:   Reason for Stopping:         metoprolol tartrate (LOPRESSOR) 25 MG tablet Comments:   Reason for Stopping:         senna (SENOKOT) 8.6 MG tablet Comments:   Reason for Stopping:                     Rationale for medication changes:      Hospice meds ordered--dilaudid prn, ativan, dulcolax supp, atropine, zofran  Stopped lisinopril due to poor renal fx  Lasix bid  Hydralazine  changed home metoprolol 25 bid to metoprolol  25mg daily        Consults       CARDIOLOGY IP CONSULT  CARDIOLOGY IP CONSULT  INPATIENT HOSPICE ADULT CONSULT  SOCIAL WORK IP CONSULT  PHYSICAL THERAPY ADULT IP CONSULT    Immunizations given this encounter     Most Recent Immunizations   Administered Date(s) Administered     COVID-19,PF,Moderna 01/14/2021     Flu, Unspecified 10/04/2018     Influenza (H1N1) 02/01/2010     Influenza (High Dose) 3 valent vaccine 09/26/2017     Influenza (IIV3) PF 10/28/2008     Influenza Quad, Recombinant, pf(RIV4) (Flublok) 10/17/2019     Influenza Vaccine, 6+MO IM (QUADRIVALENT W/PRESERVATIVES) 10/16/2012     Influenza, Quad, High Dose, Pf, 65yr+ (Fluzone HD) 09/29/2020     Pneumococcal 23 valent 09/17/2009     Td,adult,historic,unspecified 09/17/2009           Anticoagulation Information       Recent INR results: No results for input(s): INR in the last 168 hours.  Warfarin doses (if applicable) or name of other anticoagulant: none      SIGNIFICANT IMAGING FINDINGS     Results for orders placed or performed during the hospital encounter of 22   XR Chest Port 1 View    Impression    IMPRESSION: Lungs are hyperexpanded, unchanged.      Subtle spiculated density in the left upper lobe, lateral to the aortic knob. There is no central mass. If needed, chest CT can further evaluate this    No signs of pneumonia or failure. Heart and pulmonary vascularity are normal. Severe S-shaped thoracolumbar scoliosis   Echocardiogram Complete   Result Value Ref Range    LVEF  30-35% (moderately reduced)        SIGNIFICANT LABORATORY FINDINGS     Most Recent 3 CBC's:Recent Labs   Lab Test 22  1325 20  1214 20  1445   WBC 8.9 9.7 7.8   HGB 12.2 12.7 12.3   * 94 90    243 250     Most Recent 3 BMP's:Recent Labs   Lab Test 22  0516 22  0509 22  0506    139 136   POTASSIUM 4.0 3.9 4.1   CHLORIDE 99 98 98   CO2 31 28 26   BUN 69* 68* 68*   CR 1.53* 1.62* 1.73*   ANIONGAP 9 13 12   COURTNEY 8.7 8.7 8.5   GLC 84 89 92     Most Recent 2 LFT's:Recent Labs   Lab Test 22  1325 19  0612   AST 24 14   ALT 20 <9   ALKPHOS 73 54   BILITOTAL 0.3 0.3       Echocardiogram Complete    Result Date: 2022  [id number]^LANRE^MEKA^[place] [address]  Name: ANA ROSA ROSAS MRN: [id number] : 05/15/1924 Study Date: 2022 06:07 PM Age: 89 yrs Gender: Female Patient Location: [facility] Reason For Study: CHF Ordering Physician: MEKA DE DIOS Performed By: MARS  BSA: 1.6 m2 Height: 67 in Weight: 109 lb HR: 79 ______________________________________________________________________________ Procedure Complete Echo Adult. ______________________________________________________________________________  Interpretation Summary  The left ventricle is mildly dilated. Left ventricular function is decreased. The ejection fraction is 30-35% (moderately reduced). There is mild concentric left ventricular hypertrophy. The right ventricle is moderately dilated. Moderately decreased right ventricular systolic function The left atrium is severely dilated. The right atrium is moderate to severely dilated. There is severe mitral annular calcification. There is moderately severe (3+) mitral regurgitation. There is moderate mitral stenosis. There is moderate aortic stenosis (aortic valve area = .75 - 1.0cm2). ______________________________________________________________________________ Left Ventricle The left ventricle is mildly dilated. Left ventricular function is decreased. The ejection fraction is 30-35% (moderately reduced). There is mild concentric left ventricular hypertrophy. Left ventricular diastolic function is abnormal. No regional wall motion abnormalities noted.  Right Ventricle The right ventricle is moderately dilated. Moderately decreased right ventricular systolic function.  Atria The left atrium is severely dilated. The right atrium is moderate to severely dilated.  Mitral Valve There is severe mitral annular calcification. There is moderately severe (3+) mitral regurgitation. There is moderate mitral stenosis.  Tricuspid Valve The tricuspid valve is not well visualized, but is grossly normal. There is mild to moderate (1-2+) tricuspid regurgitation. Right ventricular systolic pressure is elevated, consistent with mild pulmonary hypertension.  Aortic Valve There is trace aortic regurgitation. There is moderate aortic stenosis (aortic valve area = .75 - 1.0cm2).  Pulmonic Valve The pulmonic valve is not well visualized. This degree of valvular regurgitation is within normal limits.  Vessels The aorta root is normal. Normal size ascending aorta. IVC diameter <2.1 cm collapsing >50% with sniff suggests a normal  RA pressure of 3 mmHg.  Pericardium There is no pericardial effusion.  ______________________________________________________________________________ MMode/2D Measurements & Calculations Ao root diam: 2.7 cm LA dimension: 5.1 cm LA/Ao: 1.9 LVOT diam: 2.1 cm LVOT area: 3.4 cm2  LA Volume Indexed (AL/bp): 82.9 ml/m2  Doppler Measurements & Calculations MV max P.7 mmHg MV mean P.5 mmHg MV V2 VTI: 60.6 cm MVA(VTI): 0.67 cm2 Ao V2 max: 340.6 cm/sec Ao max P.0 mmHg Ao V2 mean: 226.2 cm/sec Ao mean P.8 mmHg Ao V2 VTI: 69.9 cm YESSICA(I,D): 0.58 cm2 YESSICA(V,D): 0.71 cm2 LV V1 max P.1 mmHg LV V1 max: 71.6 cm/sec LV V1 VTI: 12.0 cm SV(LVOT): 40.5 ml SI(LVOT): 25.9 ml/m2 TR max walter: 386.7 cm/sec TR max P.8 mmHg AV Walter Ratio (DI): 0.21 YESSICA Index (cm2/m2): 0.37  ______________________________________________________________________________ Report approved by: Karla Lawrence 04/15/2022 08:47 AM       XR Chest Port 1 View    Result Date: 2022  EXAM: XR CHEST PORT 1 VIEW LOCATION: [facility] DATE/TIME: 2022 1:42 PM INDICATION: cough COMPARISON: 2019, 2012     IMPRESSION: Lungs are hyperexpanded, unchanged.  Subtle spiculated density in the left upper lobe, lateral to the aortic knob. There is no central mass. If needed, chest CT can further evaluate this No signs of pneumonia or failure. Heart and pulmonary vascularity are normal. Severe S-shaped thoracolumbar scoliosis      Discharge Orders        General info for SNF    Length of Stay Estimate: Long Term Care  Condition at Discharge: Terminal  Level of care:skilled   Rehabilitation Potential: Poor  Admission H&P remains valid and up-to-date: Yes  Recent Chemotherapy: N/A  Use Nursing Home Standing Orders: Yes     Follow Up and recommended labs and tests    Will be managed by [facility]     Reason for your hospital stay    Sob, chf     Activity - Up with nursing assistance     No CPR- Do NOT  Intubate     Fall precautions     Diet    Follow this diet upon discharge: Orders Placed This Encounter      Room Service      Combination Diet 2 gm NA Diet       Examination   Physical Exam   Temp:  [97.2  F (36.2  C)-98.4  F (36.9  C)] 97.2  F (36.2  C)  Pulse:  [75-90] 90  Resp:  [16-18] 16  BP: ()/(42-79) 105/59  SpO2:  [94 %-96 %] 94 %  Wt Readings from Last 1 Encounters:   04/19/22 32.9 kg (72 lb 8 oz)       See today's note      Please see EMR for more detailed significant labs, imaging, consultant notes etc.    I, Phyllis German MD, personally saw the patient today and spent greater than 30 minutes discharging this patient.    Phyllis German MD  [facility]    CC:Douglas Sosa

## 2022-04-20 NOTE — PROGRESS NOTES
Clinic Care Coordination Contact    Background: Care Coordination referral placed from [facility] discharge report for reason of patient meeting criteria for a TCM outreach call by [facility] [place] team.    Assessment: Upon chart review, [facility] Team member will cancel/close the referral for TCM outreach due to reason below:    Patient has been discharged to Hospice Care    Plan: Care Coordination referral for TCM outreach canceled.    Gloria Lomeli  Community Health Worker  [facility]  Ph: [phone]

## 2022-04-22 ENCOUNTER — DOCUMENTATION ONLY (OUTPATIENT)
Dept: OTHER | Facility: CLINIC | Age: 87
End: 2022-04-22
Payer: COMMERCIAL

## 2022-04-25 ENCOUNTER — MEDICAL CORRESPONDENCE (OUTPATIENT)
Dept: HEALTH INFORMATION MANAGEMENT | Facility: CLINIC | Age: 87
End: 2022-04-25
Payer: COMMERCIAL

## 2023-10-20 NOTE — H&P
Detail Level: Simple [facility] Admission History and Physical    Pinky Duggan  [facility]   [address] [place] [id number]  : 5/15/1924  Admission Date/Time: 2022  1:04 PM    Primary Care Provider / Referring Physician: Douglas Sosa  [facility] Attending Physician:  Espinoza Hunter DO     Assessment:     Principal Problem:    Acute on chronic systolic congestive heart failure (H)  Active Problems:    DNR (do not resuscitate) / DNI    HTN (hypertension)    Paroxysmal atrial fibrillation (H) - single episode 3/19    Dilated cardiomyopathy (H)    Acute kidney injury superimposed on CKD (H)    Gastroesophageal reflux disease with esophagitis      Plan:    89-year-old female with history of paroxysmal A. fib, dilated cardiomyopathy, chronic systolic CHF, CKD and GERD presents with shortness of breath and found to have acute on chronic systolic CHF.    Shortness of breath: Likely secondary to acute on chronic systolic CHF given markedly elevated BNP with complaints of orthopnea.  Chest x-ray shows no evidence to corroborate CHF and does show a spiculated density left upper lobe.  Troponin negative.  EKG without concerning ischemic findings.  Most recent TTE from 2019 showed EF of 20% with moderate to severe mitral regurgitation and mild aortic stenosis with mild aortic insufficiency.  STAT echo ordered on admission reportedly shows further decrease in EF to 10 to 20% compared to 20% in 2019, there is also findings of severe mitral regurg compared to moderate to severe mitral regurg in 2019.  --Cardiology consultation for the morning  --IV Lasix 20 mg every 8 hours for 3 doses  --Telemetry monitoring  --Await formal TTE in the a.m.  --Continue home metoprolol  --Hold home lisinopril given YASMEEN  --Hospice consultation for the a.m.      YASMEEN on CKD: Suspect related to cardiorenal physiology  --Hold home lisinopril indefinitely  --Monitor for improvement with diuresis      Spiculated density left upper  Detail Level: Generalized lobe: Incidentally found on chest x-ray, can consider CT of the chest if congruent with patient and family goals of care.  --Hospice consultation      Paroxysmal A. fib: Continue home metoprolol and full-strength aspirin      History of GERD: Continue home PPI      DVT PPX: SCD    Code status: DNR confirmed on admission          Espinoza AAnamaria Hunter D.O.          _____________________________________________________________  CHIEF COMPLAINT:    Shortness of breath    HPI:  89-year-old female with history of paroxysmal A. fib, dilated cardiomyopathy, chronic systolic CHF, CKD and GERD presents with shortness of breath and found to have acute on chronic systolic CHF.  Endorses increasing nonproductive cough with orthopnea for the past 2 days.  She has associated CORMIER.  Remainder of ROS negative. Denies any other exacerbating or improving factors.          ALLERGIES/SENSITIVITIES:   Allergies   Allergen Reactions     Penicillins Swelling       (Not in a hospital admission)      Past Medical History:   Diagnosis Date     Abnormal ECG      Atrial fibrillation (H)      CHF (congestive heart failure) (H)      Closed left hip fracture, initial encounter (H) 6/27/2019     DNR (do not resuscitate)      HTN (hypertension)      Nonsmoker      Osteoporosis     Completed 5 years of alendronate therapy.     Paroxysmal atrial fibrillation (H)      Uses walker 1/5/2020       Past Surgical History:   Procedure Laterality Date     APPENDECTOMY       ZZC PERCUT FIX PROX/NECK FEMUR FX Left 6/28/2019    Procedure: INTERNAL FIXATION, FRACTURE, HIP, WITH PINS OR NAILS;  Surgeon: Rohith Galeano MD;  Location: [facility];  Service: Orthopedics       REVIEW OF SYSTEMS:   Constitutional: no fever, chills, or sweats  Eyes: No visual disturbance or irritation  ENT: No nose or throat congestion or pain  Respiratory: No wheezes, or pain with breathing  Cardiovascular: No chest pain or palpitations  Gastrointestinal: No nausea, vomiting,  diarrhea, dyspepsia, or pain  Genitourinary: No urgency, frequency, or dysuria  Integument/breast: No rash, pruritis, or lesion  Hematologic/lymphatic: No bleeding or unusual bruising  Musculoskeletal: No joint swelling, pain, or unusual back pain  Neurological: No headache, arm or leg numbness or weakness, dizziness, or gait disturbance  Psychiatric: No anxiety, or depression, or hallucinations  Endocrine: No appetite disturbance, sleep disturbance, or unusual weight loss or gain  Allergic/Immunologic: No hives, allergic swelling or wheeze or rhinitis  All other systems on reveiw are negative.    Social History     Socioeconomic History     Marital status:      Spouse name: Not on file     Number of children: 0     Years of education: Not on file     Highest education level: Not on file   Occupational History     Not on file   Tobacco Use     Smoking status: Never Smoker     Smokeless tobacco: Never Used   Substance and Sexual Activity     Alcohol use: Yes     Comment: Alcoholic Drinks/day: not regularly     Drug use: No     Sexual activity: Not on file   Other Topics Concern     Not on file   Social History Narrative    Former patient of Dr. Damaris Billings.    No children.  Former .    POA for medical decisions is Araceli Villarreal.    Contact Alida Villarreal for results or issues - may leave message.  Home - [phone]  Cell phone - [phone]    Moved to  [facility] in 2019.  Walks with a walker.  Remains active.     Social Determinants of Health     Financial Resource Strain: Not on file   Food Insecurity: Not on file   Transportation Needs: Not on file   Physical Activity: Not on file   Stress: Not on file   Social Connections: Not on file   Intimate Partner Violence: Not on file   Housing Stability: Not on file          Family History   Problem Relation Age of Onset     Cerebrovascular Disease Mother      Leukemia Father        PHYSICAL EXAM:  General Appearance: In no acute  "distress  BP (!) 145/66   Pulse 92   Temp 97.8  F (36.6  C)   Resp 28   Ht 1.702 m (5' 7\")   Wt 49.4 kg (109 lb)   SpO2 97%   BMI 17.07 kg/m    EYES: Clear, without inflammation   HEENT: Without congestion or inflammation  RESPIRATORY: Coarse bilateral breath sounds  CARDIOVASCULAR: Tacky and regular, no le edema bilat.  ABDOMEN: soft and non-tenderRECTAL: deferred  GENITOURINARY: deferred  NEUROLOGIC: No focal arm or leg  weakness, speech is clear      Labs Reviewed:   Recent Results (from the past 24 hour(s))   Extra Red Top Tube    Collection Time: 04/14/22  1:25 PM   Result Value Ref Range    Hold Specimen JIC    Extra Purple Top Tube    Collection Time: 04/14/22  1:25 PM   Result Value Ref Range    Hold Specimen JIC    Troponin I    Collection Time: 04/14/22  1:25 PM   Result Value Ref Range    Troponin I 0.04 0.00 - 0.29 ng/mL   Comprehensive metabolic panel    Collection Time: 04/14/22  1:25 PM   Result Value Ref Range    Sodium 139 136 - 145 mmol/L    Potassium 5.6 (H) 3.5 - 5.0 mmol/L    Chloride 105 98 - 107 mmol/L    Carbon Dioxide (CO2) 20 (L) 22 - 31 mmol/L    Anion Gap 14 5 - 18 mmol/L    Urea Nitrogen 56 (H) 8 - 28 mg/dL    Creatinine 1.72 (H) 0.60 - 1.10 mg/dL    Calcium 8.8 8.5 - 10.5 mg/dL    Glucose 113 70 - 125 mg/dL    Alkaline Phosphatase 73 45 - 120 U/L    AST 24 0 - 40 U/L    ALT 20 0 - 45 U/L    Protein Total 6.8 6.0 - 8.0 g/dL    Albumin 3.0 (L) 3.5 - 5.0 g/dL    Bilirubin Total 0.3 0.0 - 1.0 mg/dL    GFR Estimate 27 (L) >60 mL/min/1.73m2   B-Type Natriuretic Peptide ([facility] Only)    Collection Time: 04/14/22  1:25 PM   Result Value Ref Range    BNP 4,354 (H) 0 - 167 pg/mL   CBC with platelets and differential    Collection Time: 04/14/22  1:25 PM   Result Value Ref Range    WBC Count 8.9 4.0 - 11.0 10e3/uL    RBC Count 3.91 3.80 - 5.20 10e6/uL    Hemoglobin 12.2 11.7 - 15.7 g/dL    Hematocrit 39.4 35.0 - 47.0 %     (H) 78 - 100 fL    MCH 31.2 26.5 - 33.0 pg    MCHC 31.0 (L) " 31.5 - 36.5 g/dL    RDW 13.7 10.0 - 15.0 %    Platelet Count 297 150 - 450 10e3/uL    % Neutrophils 83 %    % Lymphocytes 9 %    % Monocytes 7 %    % Eosinophils 0 %    % Basophils 1 %    % Immature Granulocytes 0 %    NRBCs per 100 WBC 0 <1 /100    Absolute Neutrophils 7.4 1.6 - 8.3 10e3/uL    Absolute Lymphocytes 0.8 0.8 - 5.3 10e3/uL    Absolute Monocytes 0.6 0.0 - 1.3 10e3/uL    Absolute Eosinophils 0.0 0.0 - 0.7 10e3/uL    Absolute Basophils 0.1 0.0 - 0.2 10e3/uL    Absolute Immature Granulocytes 0.0 <=0.4 10e3/uL    Absolute NRBCs 0.0 10e3/uL   Symptomatic; Yes; 4/1/2022 Influenza A/B & SARS-CoV2 (COVID-19) Virus PCR Multiplex Nasopharyngeal    Collection Time: 04/14/22  2:04 PM    Specimen: Nasopharyngeal; Swab   Result Value Ref Range    Influenza A PCR Negative Negative    Influenza B PCR Negative Negative    SARS CoV2 PCR Negative Negative